# Patient Record
Sex: FEMALE | Race: WHITE | NOT HISPANIC OR LATINO | URBAN - METROPOLITAN AREA
[De-identification: names, ages, dates, MRNs, and addresses within clinical notes are randomized per-mention and may not be internally consistent; named-entity substitution may affect disease eponyms.]

---

## 2017-11-12 ENCOUNTER — INPATIENT (INPATIENT)
Facility: HOSPITAL | Age: 82
LOS: 9 days | Discharge: HOME | DRG: 280 | End: 2017-11-22
Attending: INTERNAL MEDICINE | Admitting: INTERNAL MEDICINE
Payer: MEDICARE

## 2017-11-12 VITALS
OXYGEN SATURATION: 91 % | DIASTOLIC BLOOD PRESSURE: 68 MMHG | HEART RATE: 74 BPM | SYSTOLIC BLOOD PRESSURE: 137 MMHG | WEIGHT: 154.98 LBS | RESPIRATION RATE: 28 BRPM

## 2017-11-12 DIAGNOSIS — I50.9 HEART FAILURE, UNSPECIFIED: ICD-10-CM

## 2017-11-12 DIAGNOSIS — I48.91 UNSPECIFIED ATRIAL FIBRILLATION: ICD-10-CM

## 2017-11-12 DIAGNOSIS — J96.01 ACUTE RESPIRATORY FAILURE WITH HYPOXIA: ICD-10-CM

## 2017-11-12 DIAGNOSIS — I21.4 NON-ST ELEVATION (NSTEMI) MYOCARDIAL INFARCTION: ICD-10-CM

## 2017-11-12 DIAGNOSIS — Z29.9 ENCOUNTER FOR PROPHYLACTIC MEASURES, UNSPECIFIED: ICD-10-CM

## 2017-11-12 LAB
ALBUMIN SERPL ELPH-MCNC: 2.9 G/DL — LOW (ref 3.5–5)
ALP SERPL-CCNC: 137 U/L — HIGH (ref 40–120)
ALT FLD-CCNC: 28 U/L DA — SIGNIFICANT CHANGE UP (ref 10–60)
ANION GAP SERPL CALC-SCNC: 6 MMOL/L — SIGNIFICANT CHANGE UP (ref 5–17)
APPEARANCE UR: CLEAR — SIGNIFICANT CHANGE UP
APTT BLD: 30.7 SEC — SIGNIFICANT CHANGE UP (ref 27.5–37.4)
AST SERPL-CCNC: 22 U/L — SIGNIFICANT CHANGE UP (ref 10–40)
BASE EXCESS BLDA CALC-SCNC: 0.5 MMOL/L — SIGNIFICANT CHANGE UP (ref -2–2)
BASE EXCESS BLDV CALC-SCNC: -1.2 MMOL/L — SIGNIFICANT CHANGE UP (ref -2–2)
BASOPHILS # BLD AUTO: 0.2 K/UL — SIGNIFICANT CHANGE UP (ref 0–0.2)
BASOPHILS NFR BLD AUTO: 1 % — SIGNIFICANT CHANGE UP (ref 0–2)
BILIRUB SERPL-MCNC: 0.4 MG/DL — SIGNIFICANT CHANGE UP (ref 0.2–1.2)
BILIRUB UR-MCNC: NEGATIVE — SIGNIFICANT CHANGE UP
BLOOD GAS COMMENTS ARTERIAL: SIGNIFICANT CHANGE UP
BLOOD GAS COMMENTS, VENOUS: SIGNIFICANT CHANGE UP
BUN SERPL-MCNC: 27 MG/DL — HIGH (ref 7–18)
CALCIUM SERPL-MCNC: 9.2 MG/DL — SIGNIFICANT CHANGE UP (ref 8.4–10.5)
CHLORIDE SERPL-SCNC: 105 MMOL/L — SIGNIFICANT CHANGE UP (ref 96–108)
CK MB BLD-MCNC: 6.9 % — HIGH (ref 0–3.5)
CK MB CFR SERPL CALC: 4.5 NG/ML — HIGH (ref 0–3.6)
CK SERPL-CCNC: 65 U/L — SIGNIFICANT CHANGE UP (ref 21–215)
CO2 SERPL-SCNC: 31 MMOL/L — SIGNIFICANT CHANGE UP (ref 22–31)
COLOR SPEC: YELLOW — SIGNIFICANT CHANGE UP
CREAT SERPL-MCNC: 1.23 MG/DL — SIGNIFICANT CHANGE UP (ref 0.5–1.3)
DIFF PNL FLD: ABNORMAL
EOSINOPHIL # BLD AUTO: 0.3 K/UL — SIGNIFICANT CHANGE UP (ref 0–0.5)
EOSINOPHIL NFR BLD AUTO: 1.6 % — SIGNIFICANT CHANGE UP (ref 0–6)
GLUCOSE SERPL-MCNC: 220 MG/DL — HIGH (ref 70–99)
GLUCOSE UR QL: NEGATIVE — SIGNIFICANT CHANGE UP
HCO3 BLDA-SCNC: 26 MMOL/L — SIGNIFICANT CHANGE UP (ref 23–27)
HCO3 BLDV-SCNC: 29 MMOL/L — SIGNIFICANT CHANGE UP (ref 21–29)
HCT VFR BLD CALC: 46.6 % — HIGH (ref 34.5–45)
HGB BLD-MCNC: 14.4 G/DL — SIGNIFICANT CHANGE UP (ref 11.5–15.5)
HOROWITZ INDEX BLDA+IHG-RTO: 40 — SIGNIFICANT CHANGE UP
HOROWITZ INDEX BLDV+IHG-RTO: 21 — SIGNIFICANT CHANGE UP
INR BLD: 1.34 RATIO — HIGH (ref 0.88–1.16)
KETONES UR-MCNC: NEGATIVE — SIGNIFICANT CHANGE UP
LACTATE SERPL-SCNC: 2.3 MMOL/L — HIGH (ref 0.7–2)
LEUKOCYTE ESTERASE UR-ACNC: NEGATIVE — SIGNIFICANT CHANGE UP
LYMPHOCYTES # BLD AUTO: 1.1 K/UL — SIGNIFICANT CHANGE UP (ref 1–3.3)
LYMPHOCYTES # BLD AUTO: 6.9 % — LOW (ref 13–44)
MCHC RBC-ENTMCNC: 30.5 PG — SIGNIFICANT CHANGE UP (ref 27–34)
MCHC RBC-ENTMCNC: 30.8 GM/DL — LOW (ref 32–36)
MCV RBC AUTO: 98.9 FL — SIGNIFICANT CHANGE UP (ref 80–100)
MONOCYTES # BLD AUTO: 0.8 K/UL — SIGNIFICANT CHANGE UP (ref 0–0.9)
MONOCYTES NFR BLD AUTO: 4.9 % — SIGNIFICANT CHANGE UP (ref 2–14)
NEUTROPHILS # BLD AUTO: 13.5 K/UL — HIGH (ref 1.8–7.4)
NEUTROPHILS NFR BLD AUTO: 85.7 % — HIGH (ref 43–77)
NITRITE UR-MCNC: NEGATIVE — SIGNIFICANT CHANGE UP
NT-PROBNP SERPL-SCNC: HIGH PG/ML (ref 0–450)
PCO2 BLDA: 48 MMHG — HIGH (ref 32–46)
PCO2 BLDV: 79 MMHG — HIGH (ref 35–50)
PH BLDA: 7.35 — SIGNIFICANT CHANGE UP (ref 7.35–7.45)
PH BLDV: 7.19 — CRITICAL LOW (ref 7.35–7.45)
PH UR: 5 — SIGNIFICANT CHANGE UP (ref 5–8)
PLATELET # BLD AUTO: 292 K/UL — SIGNIFICANT CHANGE UP (ref 150–400)
PO2 BLDA: 132 MMHG — HIGH (ref 74–108)
PO2 BLDV: SIGNIFICANT CHANGE UP MMHG (ref 25–45)
POTASSIUM SERPL-MCNC: 4.3 MMOL/L — SIGNIFICANT CHANGE UP (ref 3.5–5.3)
POTASSIUM SERPL-SCNC: 4.3 MMOL/L — SIGNIFICANT CHANGE UP (ref 3.5–5.3)
PROT SERPL-MCNC: 7.4 G/DL — SIGNIFICANT CHANGE UP (ref 6–8.3)
PROT UR-MCNC: NEGATIVE — SIGNIFICANT CHANGE UP
PROTHROM AB SERPL-ACNC: 14.7 SEC — HIGH (ref 9.8–12.7)
RBC # BLD: 4.71 M/UL — SIGNIFICANT CHANGE UP (ref 3.8–5.2)
RBC # FLD: 12.3 % — SIGNIFICANT CHANGE UP (ref 10.3–14.5)
SAO2 % BLDA: 98 % — HIGH (ref 92–96)
SAO2 % BLDV: 26 % — LOW (ref 67–88)
SODIUM SERPL-SCNC: 142 MMOL/L — SIGNIFICANT CHANGE UP (ref 135–145)
SP GR SPEC: 1.01 — SIGNIFICANT CHANGE UP (ref 1.01–1.02)
TROPONIN I SERPL-MCNC: 0.17 NG/ML — HIGH (ref 0–0.04)
TROPONIN I SERPL-MCNC: 0.2 NG/ML — HIGH (ref 0–0.04)
UROBILINOGEN FLD QL: NEGATIVE — SIGNIFICANT CHANGE UP
WBC # BLD: 15.8 K/UL — HIGH (ref 3.8–10.5)
WBC # FLD AUTO: 15.8 K/UL — HIGH (ref 3.8–10.5)

## 2017-11-12 PROCEDURE — 71010: CPT | Mod: 26

## 2017-11-12 PROCEDURE — 99053 MED SERV 10PM-8AM 24 HR FAC: CPT

## 2017-11-12 PROCEDURE — 99291 CRITICAL CARE FIRST HOUR: CPT

## 2017-11-12 RX ORDER — AZTREONAM 2 G
1000 VIAL (EA) INJECTION EVERY 8 HOURS
Qty: 0 | Refills: 0 | Status: DISCONTINUED | OUTPATIENT
Start: 2017-11-12 | End: 2017-11-14

## 2017-11-12 RX ORDER — AMIODARONE HYDROCHLORIDE 400 MG/1
200 TABLET ORAL
Qty: 0 | Refills: 0 | Status: DISCONTINUED | OUTPATIENT
Start: 2017-11-12 | End: 2017-11-13

## 2017-11-12 RX ORDER — IPRATROPIUM BROMIDE 0.2 MG/ML
500 SOLUTION, NON-ORAL INHALATION
Qty: 0 | Refills: 0 | Status: COMPLETED | OUTPATIENT
Start: 2017-11-12 | End: 2017-11-12

## 2017-11-12 RX ORDER — METOPROLOL TARTRATE 50 MG
12.5 TABLET ORAL
Qty: 0 | Refills: 0 | Status: DISCONTINUED | OUTPATIENT
Start: 2017-11-12 | End: 2017-11-13

## 2017-11-12 RX ORDER — ASPIRIN/CALCIUM CARB/MAGNESIUM 324 MG
300 TABLET ORAL ONCE
Qty: 0 | Refills: 0 | Status: COMPLETED | OUTPATIENT
Start: 2017-11-12 | End: 2017-11-12

## 2017-11-12 RX ORDER — ASPIRIN/CALCIUM CARB/MAGNESIUM 324 MG
81 TABLET ORAL DAILY
Qty: 0 | Refills: 0 | Status: DISCONTINUED | OUTPATIENT
Start: 2017-11-13 | End: 2017-11-22

## 2017-11-12 RX ORDER — AZTREONAM 2 G
1000 VIAL (EA) INJECTION ONCE
Qty: 0 | Refills: 0 | Status: COMPLETED | OUTPATIENT
Start: 2017-11-12 | End: 2017-11-12

## 2017-11-12 RX ORDER — HEPARIN SODIUM 5000 [USP'U]/ML
4000 INJECTION INTRAVENOUS; SUBCUTANEOUS EVERY 6 HOURS
Qty: 0 | Refills: 0 | Status: DISCONTINUED | OUTPATIENT
Start: 2017-11-12 | End: 2017-11-13

## 2017-11-12 RX ORDER — ALBUTEROL 90 UG/1
1 AEROSOL, METERED ORAL EVERY 6 HOURS
Qty: 0 | Refills: 0 | Status: DISCONTINUED | OUTPATIENT
Start: 2017-11-12 | End: 2017-11-12

## 2017-11-12 RX ORDER — TICAGRELOR 90 MG/1
180 TABLET ORAL ONCE
Qty: 0 | Refills: 0 | Status: DISCONTINUED | OUTPATIENT
Start: 2017-11-12 | End: 2017-11-12

## 2017-11-12 RX ORDER — ATORVASTATIN CALCIUM 80 MG/1
80 TABLET, FILM COATED ORAL AT BEDTIME
Qty: 0 | Refills: 0 | Status: DISCONTINUED | OUTPATIENT
Start: 2017-11-12 | End: 2017-11-22

## 2017-11-12 RX ORDER — FUROSEMIDE 40 MG
40 TABLET ORAL
Qty: 0 | Refills: 0 | Status: DISCONTINUED | OUTPATIENT
Start: 2017-11-12 | End: 2017-11-14

## 2017-11-12 RX ORDER — LEVOTHYROXINE SODIUM 125 MCG
50 TABLET ORAL DAILY
Qty: 0 | Refills: 0 | Status: DISCONTINUED | OUTPATIENT
Start: 2017-11-12 | End: 2017-11-22

## 2017-11-12 RX ORDER — ALBUTEROL 90 UG/1
2.5 AEROSOL, METERED ORAL
Qty: 0 | Refills: 0 | Status: COMPLETED | OUTPATIENT
Start: 2017-11-12 | End: 2017-11-12

## 2017-11-12 RX ORDER — LOSARTAN POTASSIUM 100 MG/1
25 TABLET, FILM COATED ORAL DAILY
Qty: 0 | Refills: 0 | Status: DISCONTINUED | OUTPATIENT
Start: 2017-11-12 | End: 2017-11-14

## 2017-11-12 RX ORDER — IPRATROPIUM/ALBUTEROL SULFATE 18-103MCG
3 AEROSOL WITH ADAPTER (GRAM) INHALATION EVERY 6 HOURS
Qty: 0 | Refills: 0 | Status: DISCONTINUED | OUTPATIENT
Start: 2017-11-12 | End: 2017-11-12

## 2017-11-12 RX ORDER — FUROSEMIDE 40 MG
40 TABLET ORAL ONCE
Qty: 0 | Refills: 0 | Status: COMPLETED | OUTPATIENT
Start: 2017-11-12 | End: 2017-11-12

## 2017-11-12 RX ORDER — ASPIRIN/CALCIUM CARB/MAGNESIUM 324 MG
325 TABLET ORAL ONCE
Qty: 0 | Refills: 0 | Status: COMPLETED | OUTPATIENT
Start: 2017-11-12 | End: 2017-11-12

## 2017-11-12 RX ORDER — FUROSEMIDE 40 MG
40 TABLET ORAL DAILY
Qty: 0 | Refills: 0 | Status: DISCONTINUED | OUTPATIENT
Start: 2017-11-12 | End: 2017-11-12

## 2017-11-12 RX ORDER — TIOTROPIUM BROMIDE 18 UG/1
1 CAPSULE ORAL; RESPIRATORY (INHALATION) DAILY
Qty: 0 | Refills: 0 | Status: DISCONTINUED | OUTPATIENT
Start: 2017-11-12 | End: 2017-11-12

## 2017-11-12 RX ORDER — IPRATROPIUM/ALBUTEROL SULFATE 18-103MCG
3 AEROSOL WITH ADAPTER (GRAM) INHALATION EVERY 6 HOURS
Qty: 0 | Refills: 0 | Status: DISCONTINUED | OUTPATIENT
Start: 2017-11-12 | End: 2017-11-13

## 2017-11-12 RX ORDER — FUROSEMIDE 40 MG
40 TABLET ORAL DAILY
Qty: 0 | Refills: 0 | Status: COMPLETED | OUTPATIENT
Start: 2017-11-12 | End: 2017-11-12

## 2017-11-12 RX ORDER — VANCOMYCIN HCL 1 G
750 VIAL (EA) INTRAVENOUS EVERY 12 HOURS
Qty: 0 | Refills: 0 | Status: DISCONTINUED | OUTPATIENT
Start: 2017-11-12 | End: 2017-11-14

## 2017-11-12 RX ORDER — VANCOMYCIN HCL 1 G
1000 VIAL (EA) INTRAVENOUS ONCE
Qty: 0 | Refills: 0 | Status: COMPLETED | OUTPATIENT
Start: 2017-11-12 | End: 2017-11-12

## 2017-11-12 RX ORDER — VANCOMYCIN HCL 1 G
1000 VIAL (EA) INTRAVENOUS EVERY 12 HOURS
Qty: 0 | Refills: 0 | Status: DISCONTINUED | OUTPATIENT
Start: 2017-11-12 | End: 2017-11-12

## 2017-11-12 RX ORDER — IPRATROPIUM BROMIDE 0.2 MG/ML
1 SOLUTION, NON-ORAL INHALATION EVERY 6 HOURS
Qty: 0 | Refills: 0 | Status: DISCONTINUED | OUTPATIENT
Start: 2017-11-12 | End: 2017-11-12

## 2017-11-12 RX ORDER — HEPARIN SODIUM 5000 [USP'U]/ML
600 INJECTION INTRAVENOUS; SUBCUTANEOUS
Qty: 25000 | Refills: 0 | Status: DISCONTINUED | OUTPATIENT
Start: 2017-11-13 | End: 2017-11-13

## 2017-11-12 RX ORDER — HEPARIN SODIUM 5000 [USP'U]/ML
4000 INJECTION INTRAVENOUS; SUBCUTANEOUS ONCE
Qty: 0 | Refills: 0 | Status: COMPLETED | OUTPATIENT
Start: 2017-11-12 | End: 2017-11-12

## 2017-11-12 RX ORDER — HEPARIN SODIUM 5000 [USP'U]/ML
INJECTION INTRAVENOUS; SUBCUTANEOUS
Qty: 25000 | Refills: 0 | Status: DISCONTINUED | OUTPATIENT
Start: 2017-11-12 | End: 2017-11-12

## 2017-11-12 RX ADMIN — HEPARIN SODIUM 800 UNIT(S)/HR: 5000 INJECTION INTRAVENOUS; SUBCUTANEOUS at 06:33

## 2017-11-12 RX ADMIN — Medication 40 MILLIGRAM(S): at 14:35

## 2017-11-12 RX ADMIN — Medication 500 MICROGRAM(S): at 04:42

## 2017-11-12 RX ADMIN — Medication 300 MILLIGRAM(S): at 07:47

## 2017-11-12 RX ADMIN — Medication 40 MILLIGRAM(S): at 21:09

## 2017-11-12 RX ADMIN — HEPARIN SODIUM 4000 UNIT(S): 5000 INJECTION INTRAVENOUS; SUBCUTANEOUS at 06:31

## 2017-11-12 RX ADMIN — Medication 50 MICROGRAM(S): at 12:43

## 2017-11-12 RX ADMIN — AMIODARONE HYDROCHLORIDE 200 MILLIGRAM(S): 400 TABLET ORAL at 17:12

## 2017-11-12 RX ADMIN — Medication 150 MILLIGRAM(S): at 18:35

## 2017-11-12 RX ADMIN — Medication 125 MILLIGRAM(S): at 04:34

## 2017-11-12 RX ADMIN — ALBUTEROL 2.5 MILLIGRAM(S): 90 AEROSOL, METERED ORAL at 04:25

## 2017-11-12 RX ADMIN — Medication 40 MILLIGRAM(S): at 04:34

## 2017-11-12 RX ADMIN — Medication 250 MILLIGRAM(S): at 04:38

## 2017-11-12 RX ADMIN — Medication 12.5 MILLIGRAM(S): at 17:12

## 2017-11-12 RX ADMIN — Medication 50 MILLIGRAM(S): at 16:57

## 2017-11-12 RX ADMIN — Medication 50 MILLIGRAM(S): at 04:41

## 2017-11-12 RX ADMIN — LOSARTAN POTASSIUM 25 MILLIGRAM(S): 100 TABLET, FILM COATED ORAL at 17:20

## 2017-11-12 RX ADMIN — ALBUTEROL 1 PUFF(S): 90 AEROSOL, METERED ORAL at 20:37

## 2017-11-12 RX ADMIN — Medication 40 MILLIGRAM(S): at 17:20

## 2017-11-12 RX ADMIN — ALBUTEROL 2.5 MILLIGRAM(S): 90 AEROSOL, METERED ORAL at 04:30

## 2017-11-12 RX ADMIN — Medication 3 MILLILITER(S): at 21:02

## 2017-11-12 RX ADMIN — Medication 50 MILLIGRAM(S): at 21:11

## 2017-11-12 RX ADMIN — Medication 40 MILLIGRAM(S): at 12:43

## 2017-11-12 RX ADMIN — Medication 500 MICROGRAM(S): at 04:00

## 2017-11-12 RX ADMIN — ALBUTEROL 2.5 MILLIGRAM(S): 90 AEROSOL, METERED ORAL at 04:42

## 2017-11-12 RX ADMIN — ATORVASTATIN CALCIUM 80 MILLIGRAM(S): 80 TABLET, FILM COATED ORAL at 21:10

## 2017-11-12 RX ADMIN — Medication 500 MICROGRAM(S): at 04:44

## 2017-11-12 NOTE — PROGRESS NOTE ADULT - ASSESSMENT
89 yr old male, from NH, H/O A FIB/CHF/COPD/HLD/CAD/dementia, admit hospital for acute respirotary distress/resp failure/shock, admit ICU, on BIPAP, IV ABS, elevated BNP/troponin, cardiology consult, F/U ECHO, ICU care, DVT prophylaxis.

## 2017-11-12 NOTE — H&P ADULT - ATTENDING COMMENTS
MICU ATTENDING.  I have personally seen and examined the patient. I agree with history, physical and plan which I have reviewed and edited as appropriate. I was physically present for the key points of the service provided. I total spent 35 min.    89 y/o with multiple med problem presented with respiratory distress and hypoxemia. Started on BiPAP in ER, given, Albuterol/Atrovent, Solumedrol and lasix with symptomatic improvement.   A/P Hypoxic/hyper capneic failure   CHF/COPD exacerbation   ? pneumonia   A/FIB   NSTEMI     MICU  NPO   Cont with BIPAP, serial ABG, taper FIo2 as tolerated,   Serial CXR   Keep negative  IV Lasix  Solumedrol IV, Proventil/Atrovent   Panculture, Empiric antibiotics   Cardiology consult   Serial Ce   IV heparin, ASA, lipitor

## 2017-11-12 NOTE — H&P ADULT - NEGATIVE NEUROLOGICAL SYMPTOMS
no syncope/no headache/no generalized seizures/no focal seizures/no loss of consciousness/no transient paralysis/no confusion/no tremors/no vertigo/no loss of sensation

## 2017-11-12 NOTE — PROGRESS NOTE ADULT - ASSESSMENT
89 y/o F pt from [facility] with PMHx of AFib, Dementia, Heart Failure, HLD, HTN, Hypothyroidism, NSTEMI, Osteoporosis, and COPD and no significant PSHx BIB EMS from nursing home to ED with difficulty breathing noted today. Per EMS, pt with O2Sat of 52% on room air; pt was placed on a CPAP en route to ED. EMS reports no medications were given to pt during the transport to ED. Pt is DNR/DNI on MOLST, per [facility] paperwork and confirmed with pt and his son. Pt unable to provide detailed Hx and HPI due to condition (respiratory distress). PMD: Dr. Topete.  Pt denies fever, chest pain, Abd pain, nausea, vomiting, or any other complaints. Pt is allergic to Penicillin.  At NH RR 30, SpO2 55-85% on RA, T 97 F, HR 85, /90.  In ED pt RR 28, SpO2 91%, saturating 98% on 40% b 98%. Pt is Alert and orientedx2.  Pt is being admitted to ICU for Acute hypoxic Hypercapneic resp failure sec to Acute on Chronic CHF exacerbation and COPD exacerbation likely sec to HCAP/ Acute Bronchitis given leukocytosis requiring new Bipap.

## 2017-11-12 NOTE — ED PROVIDER NOTE - MEDICAL DECISION MAKING DETAILS
89yoF with h/o COPD and CHF p/w hypoxia. Diff dx includes CHF, COPD, PE, ACS, PNA. Noted rales and h/o CHF c/w CHF exacerbation. Possible component of COPD with wheezes and history. Also noted Wellen's morphology on ECG, no prior ECG's with which to compare, elevated troponin, concerning for proximal LAD lesion, but at the time of the ECG her SOB and CP had resolved, confirmed by multiple questioning, and patient alert and oriented. No u/l leg swelling and symptoms resolved with BiPAP, making PE less likely. No evidence of PNA on CXR. Under our care noted hypercapnia/hypoxia, transitioned to BiPAP with stabilization. Also 40mg Lasix for CHF, 3 Duonebs/solumedrol for COPD, and vanc/aztreonam for possible pneumonia coverage in this critically ill patient. No fluids given at this time in light of stable BP and her presenting with HF. Discussed with family at bedside, who agreed with plan and confirmed that patient adamant about being DNR and DNI. Admitted to ICU for further monitoring, w/u, and care.    Other diagnoses: acute COPD exacerbation, severe sepsis, hyperglycemia, DNR, DNI

## 2017-11-12 NOTE — ED PROVIDER NOTE - SECONDARY DIAGNOSIS.
NSTEMI (non-ST elevated myocardial infarction) COPD exacerbation Acute respiratory failure with hypoxia and hypercapnia

## 2017-11-12 NOTE — ED PROVIDER NOTE - DATA REVIEWED, MDM
nurses notes/nurse home records/vital signs/EMS record/old records vital signs/nurse home records/EMS record/nurses notes

## 2017-11-12 NOTE — ED PROVIDER NOTE - PHYSICAL EXAMINATION
Afebrile, hemodynamically stable  NAD, mild increased WOB, on CPAP  Head NCAT  EOMI grossly  MMM  No JVD  RRR, nml S1/S2, no m/r/g  Diffuse rales. Scattered wheezes  Abd soft, NT, ND, nml BS, no rebound or guarding  AAO, CN's 3-12 grossly intact. Opens eyes to voice, moves spontaneously  HARRIS spontaneously, no leg cyanosis or edema  Skin cool, no rashes or hives

## 2017-11-12 NOTE — H&P ADULT - HISTORY OF PRESENT ILLNESS
89 y/o F pt from [facility] with PMHx of AFib, Dementia, Heart Failure, HLD, HTN, Hypothyroidism, NSTEMI, Osteoporosis, and COPD and no significant PSHx BIB EMS from nursing home to ED with difficulty breathing noted today. Per EMS, pt with O2Sat of 52% on room air; pt was placed on a CPAP en route to ED. EMS reports no medications were given to pt during the transport to ED. Pt is DNR/DNI on MOLST, per [facility] paperwork and confirmed with pt and his son. Pt unable to provide detailed Hx and HPI due to condition (respiratory distress). PMD: Dr. Topete.  Pt denies fever, chest pain, Abd pain, nausea, vomiting, or any other complaints. Pt is allergic to Penicillin.  At NH RR 30, SpO2 55-85% on RA, T 97 F, HR 85, /90.  In ED pt RR 28, SpO2 91%, saturating 98% on 40% b 98%. Pt is Alert and orientedx2.

## 2017-11-12 NOTE — ED PROVIDER NOTE - CARE PLAN
Principal Discharge DX:	CHF exacerbation  Secondary Diagnosis:	NSTEMI (non-ST elevated myocardial infarction)  Secondary Diagnosis:	COPD exacerbation Principal Discharge DX:	CHF exacerbation  Secondary Diagnosis:	NSTEMI (non-ST elevated myocardial infarction)  Secondary Diagnosis:	Acute respiratory failure with hypoxia and hypercapnia

## 2017-11-12 NOTE — ED ADULT NURSE NOTE - OBJECTIVE STATEMENT
brought in by ambulance from nursing home due to respiratory distress, pt lethargic, arousable by pain, seen and examined by Dr Mcginnis, BIPAP started, noted with deep tissue injury both buttocks.

## 2017-11-12 NOTE — PROGRESS NOTE ADULT - ATTENDING COMMENTS
89 female with hx of CHF, COPD, Afib, presents with acute resp failure requiring bipap, likely due to CHF exacerbation/pulmonary edema, also with NSTEMI.  Currently on abx but pneumonia less likely.  Total CC time 35 min.

## 2017-11-12 NOTE — H&P ADULT - ASSESSMENT
89 y/o F pt from [facility] with PMHx of AFib, Dementia, Heart Failure, HLD, HTN, Hypothyroidism, NSTEMI, Osteoporosis, and COPD and no significant PSHx BIB EMS from nursing home to ED with difficulty breathing noted today. Per EMS, pt with O2Sat of 52% on room air; pt was placed on a CPAP en route to ED. EMS reports no medications were given to pt during the transport to ED. Pt is DNR/DNI on MOLST, per [facility] paperwork and confirmed with pt and his son. Pt unable to provide detailed Hx and HPI due to condition (respiratory distress). PMD: Dr. Topete.  Pt denies fever, chest pain, Abd pain, nausea, vomiting, or any other complaints. Pt is allergic to Penicillin.  At NH RR 30, SpO2 55-85% on RA, T 97 F, HR 85, /90.  In ED pt RR 28, SpO2 91%, saturating 98% on 40% b 98%. Pt is Alert and orientedx2.  Pt is being admitted to ICU for Acute hypoxic Hypercapneic resp failure sec to Acute on Chronic CHF exacerbation and COPD exacerbation likely sec to HCAP/ Acute Bronchitis given leukocytosis requiring new Bipap.

## 2017-11-12 NOTE — H&P ADULT - PMH
Atrial fibrillation    COPD (chronic obstructive pulmonary disease)    Dementia    Heart failure    HLD (hyperlipidemia)    HTN (hypertension)    Hypothyroidism    NSTEMI (non-ST elevated myocardial infarction)    Osteoporosis

## 2017-11-12 NOTE — ED PROVIDER NOTE - CONDUCTED A DETAILED DISCUSSION WITH PATIENT AND/OR GUARDIAN REGARDING, MDM
need for outpatient follow-up/radiology results/lab results need to admit/radiology results/lab results

## 2017-11-12 NOTE — H&P ADULT - PROBLEM SELECTOR PLAN 2
Pt has elevated troponin 0.17 and SOB with elevated pro BNP with deep T waves in vel lateral leads consistent with NSTEMI and CHF exacerbtaion  Started on heparin drip  s/p  MG  C/W asa, Lipiotr, lopressor  f/u ECHO  Trend CE X3  Cardio Dr. Dailey

## 2017-11-12 NOTE — PROGRESS NOTE ADULT - SUBJECTIVE AND OBJECTIVE BOX
Patient is a 89y old  Female who presents with a chief complaint of SOB/respirotary failure, admit to ICU care, on BIPAP/IV ABS, elevated BNP/troponin, cardiology consult, F/U ECHO      INTERVAL HPI/OVERNIGHT EVENTS:  T(C): 36.3 (17 @ 09:00), Max: 37.4 (17 @ 07:38)  HR: 68 (17 @ 09:00) (67 - 81)  BP: 111/56 (17 @ 09:00) (111/56 - 137/68)  RR: 23 (17 @ 09:00) (18 - 28)  SpO2: 95% (17 @ 09:00) (91% - 99%)  Wt(kg): --    LABS:                        14.4   15.8  )-----------( 292      ( 2017 05:00 )             46.6         142  |  105  |  27<H>  ----------------------------<  220<H>  4.3   |  31  |  1.23    Ca    9.2      2017 05:00    TPro  7.4  /  Alb  2.9<L>  /  TBili  0.4  /  DBili  x   /  AST  22  /  ALT  28  /  AlkPhos  137<H>  11    PT/INR - ( 2017 05:00 )   PT: 14.7 sec;   INR: 1.34 ratio         Serum Pro-Brain Natriuretic Peptide (17 @ 05:00)    Serum Pro-Brain Natriuretic Peptide: 91537: Interpretive guide for NT PRO-BNP    PTT - ( 2017 05:00 )  PTT:30.7 sec  Urinalysis Basic - ( 2017 08:14 )    Color: Yellow / Appearance: Clear / S.010 / pH: x  Gluc: x / Ketone: Negative  / Bili: Negative / Urobili: Negative   Blood: x / Protein: Negative / Nitrite: Negative   Leuk Esterase: Negative / RBC: x / WBC x   Sq Epi: x / Non Sq Epi: x / Bacteria: x      CAPILLARY BLOOD GLUCOSE        ABG - ( 2017 05:15 )  pH: 7.35  /  pCO2: 48    /  pO2: 132   / HCO3: 26    / Base Excess: 0.5   /  SaO2: 98                  RADIOLOGY & ADDITIONAL TESTS:  < from: Xray Chest 1 View AP-PORTABLE IMMEDIATE (17 @ 07:21) >  INTERPRETATION:  CLINICAL INFORMATION: Shortness of breath.    A single portable view of the chest was obtained.     Comparison: None.     The mediastinal cardiac silhouette is unremarkable. Aortic calcifications.    Mild diffuse reticular pattern with a perihilar distribution likely   vascular. Small left effusion. Question pulmonary edema.    No acute osseous finding.     IMPRESSION:    Findings as above.    < end of copied text >    Consultant(s) Notes Reviewed:  [x ] YES  [ ] NO    PHYSICAL EXAM:  GENERAL: well built, well nourished  HEAD:  Atraumatic, Normocephalic  EYES: EOMI, PERRLA, conjunctiva and sclera clear  ENT: No tonsillar erythema, exudates, or enlargement; Moist mucous membranes, Good dentition, No lesions, on BIPAP mask  NECK: Supple, No JVD, Normal thyroid, no enlarged nodes  NERVOUS SYSTEM:  Alert & Oriented X3, Good concentration; Motor Strength 5/5 B/L upper and lower extremities; DTRs 2+ intact and symmetric, sensory intact  CHEST/LUNG: B/L good air entry; No rales, rhonchi, or wheezing  HEART: S1S2 normal, no S3, Regular rate and rhythm; No murmurs, rubs, or gallops  ABDOMEN: Soft, Nontender, Nondistended; Bowel sounds present  EXTREMITIES:  2+ Peripheral Pulses, No clubbing, cyanosis, mild edema, mild cold on touch B/L legs  LYMPH: No lymphadenopathy noted  SKIN: No rashes or lesions    Care Discussed with Consultants/Other Providers [ x] YES  [ ] NO

## 2017-11-12 NOTE — ED PROVIDER NOTE - OBJECTIVE STATEMENT
89 y/o F pt with PMHx of AFib, Dementia, Heart Failure, HLD, HTN, NSTEMI, Osteoporosis, and COPD and no significant PSHx BIB EMS from nursing home to ED with difficulty breathing noted today. Per EMS, pt with O2Sat of 52% on room air; pt was placed on a CPAP en route to ED. Pt is currently DNR/DNI, per nursing home paperwork. Pt unable to provide detailed Hx and HPI due to condition (respiratory distress). 89 y/o F pt with PMHx of AFib, Dementia, Heart Failure, HLD, HTN, NSTEMI, Osteoporosis, and COPD and no significant PSHx BIB EMS from nursing home to ED with difficulty breathing noted today. Per EMS, pt with O2Sat of 52% on room air; pt was placed on a CPAP en route to ED. Pt is DNR, per nursing home paperwork. Pt unable to provide detailed Hx and HPI due to condition (respiratory distress). 89 y/o F pt with PMHx of AFib, Dementia, Heart Failure, HLD, HTN, Hypothyroidism, NSTEMI, Osteoporosis, and COPD and no significant PSHx BIB EMS from nursing home to ED with difficulty breathing noted today. Per EMS, pt with O2Sat of 52% on room air; pt was placed on a CPAP en route to ED. EMS reports no medications were given to pt during the transport to ED. Pt is DNR, per nursing home paperwork. Pt unable to provide detailed Hx and HPI due to condition (respiratory distress). 89 y/o F pt with PMHx of AFib, Dementia, Heart Failure, HLD, HTN, Hypothyroidism, NSTEMI, Osteoporosis, and COPD and no significant PSHx BIB EMS from nursing home to ED with difficulty breathing noted today. Per EMS, pt with O2Sat of 52% on room air; pt was placed on a CPAP en route to ED. EMS reports no medications were given to pt during the transport to ED. Pt is DNR, per nursing home paperwork. Pt unable to provide detailed Hx and HPI due to condition (respiratory distress). PMD: Dr. Topete. 89 y/o F pt with PMHx of AFib, Dementia, Heart Failure, HLD, HTN, Hypothyroidism, NSTEMI, Osteoporosis, and COPD and no significant PSHx BIB EMS from nursing home to ED with difficulty breathing noted today. Per EMS, pt with O2Sat of 52% on room air; pt was placed on a CPAP en route to ED. EMS reports no medications were given to pt during the transport to ED. Pt is DNR, per nursing home paperwork. Following being placed on BiPAP, patient interviewed and states SOB but largely resolved, and denies CP. PMD: Dr. Topete.

## 2017-11-12 NOTE — H&P ADULT - NEGATIVE MUSCULOSKELETAL SYMPTOMS
no joint swelling/no arm pain L/no arm pain R/no back pain/no leg pain L/no leg pain R/no muscle weakness

## 2017-11-12 NOTE — PROGRESS NOTE ADULT - SUBJECTIVE AND OBJECTIVE BOX
INTERVAL HPI/OVERNIGHT EVENTS: For respiratory distress in nursing home pt was placed on bipap in ambulance.     PRESSORS: [ ] YES [x] NO  WHICH:    ANTIBIOTICS: Azactam                 DATE STARTED: 11/12  ANTIBIOTICS: Vanco                 DATE STARTED: 11/12    Antimicrobial:  aztreonam  IVPB 1000 milliGRAM(s) IV Intermittent every 8 hours  vancomycin  IVPB 1000 milliGRAM(s) IV Intermittent every 12 hours    Cardiovascular:  amiodarone    Tablet 200 milliGRAM(s) Oral two times a day  furosemide   Injectable 40 milliGRAM(s) IV Push daily  losartan 25 milliGRAM(s) Oral daily  metoprolol     tartrate 12.5 milliGRAM(s) Oral two times a day    Pulmonary:  ALBUTerol    90 MICROgram(s) HFA Inhaler 1 Puff(s) Inhalation every 6 hours  tiotropium 18 MICROgram(s) Capsule 1 Capsule(s) Inhalation daily    Hematalogic:  heparin  Infusion.  Unit(s)/Hr IV Continuous <Continuous>  heparin  Injectable 4000 Unit(s) IV Push every 6 hours PRN    Other:  atorvastatin 80 milliGRAM(s) Oral at bedtime  levothyroxine 50 MICROGram(s) Oral daily  methylPREDNISolone sodium succinate Injectable 40 milliGRAM(s) IV Push every 8 hours    ALBUTerol    90 MICROgram(s) HFA Inhaler 1 Puff(s) Inhalation every 6 hours  amiodarone    Tablet 200 milliGRAM(s) Oral two times a day  atorvastatin 80 milliGRAM(s) Oral at bedtime  aztreonam  IVPB 1000 milliGRAM(s) IV Intermittent every 8 hours  furosemide   Injectable 40 milliGRAM(s) IV Push daily  heparin  Infusion.  Unit(s)/Hr IV Continuous <Continuous>  heparin  Injectable 4000 Unit(s) IV Push every 6 hours PRN  levothyroxine 50 MICROGram(s) Oral daily  losartan 25 milliGRAM(s) Oral daily  methylPREDNISolone sodium succinate Injectable 40 milliGRAM(s) IV Push every 8 hours  metoprolol     tartrate 12.5 milliGRAM(s) Oral two times a day  tiotropium 18 MICROgram(s) Capsule 1 Capsule(s) Inhalation daily  vancomycin  IVPB 1000 milliGRAM(s) IV Intermittent every 12 hours    Drug Dosing Weight    Weight (kg): 70.3 (12 Nov 2017 04:29)    CENTRAL LINE: [ ] YES [x] NO  LOCATION:   DATE INSERTED:    [place]: [x] YES [ ] NO    DATE INSERTED:    A-LINE:  [ ] YES [x] NO  LOCATION:   DATE INSERTED:    ICU Vital Signs Last 24 Hrs  T(C): 37.4 (12 Nov 2017 07:38), Max: 37.4 (12 Nov 2017 07:38)  T(F): 99.4 (12 Nov 2017 07:38), Max: 99.4 (12 Nov 2017 07:38)  HR: 69 (12 Nov 2017 07:38) (69 - 78)  BP: 112/49 (12 Nov 2017 07:38) (112/45 - 137/68)  BP(mean): --  ABP: --  ABP(mean): --  RR: 20 (12 Nov 2017 07:38) (20 - 28)  SpO2: 99% (12 Nov 2017 07:38) (91% - 99%)      ABG - ( 12 Nov 2017 05:15 )  pH: 7.35  /  pCO2: 48    /  pO2: 132   / HCO3: 26    / Base Excess: 0.5   /  SaO2: 98                          PHYSICAL EXAM:    GENERAL: Well developed male, NAD  HEENT:  Normocephalic/Atraumatic, reactive light reflex, moist mucous membranes  NECK: Supple, no JVD  RESP: Symmetric movement of the chest, clear to auscultation bilaterally, + wheeze  CVS: S1 and S2 audible, no murmur, rubs or gallops noted  GI: Normal active bowel sounds present, abdomen soft, non tender, non distended, without scar marks, no hepatosleenomegaly  EXTREMITIES:  no edema, no clubbing, cyanosis   MSK: Unable to asses due to lethargy and being on bipap   NEURO: alert and oriented x 0    LABS:  CBC Full  -  ( 12 Nov 2017 05:00 )  WBC Count : 15.8 K/uL  Hemoglobin : 14.4 g/dL  Hematocrit : 46.6 %  Platelet Count - Automated : 292 K/uL  Mean Cell Volume : 98.9 fl  Mean Cell Hemoglobin : 30.5 pg  Mean Cell Hemoglobin Concentration : 30.8 gm/dL  Auto Neutrophil # : 13.5 K/uL  Auto Lymphocyte # : 1.1 K/uL  Auto Monocyte # : 0.8 K/uL  Auto Eosinophil # : 0.3 K/uL  Auto Basophil # : 0.2 K/uL  Auto Neutrophil % : 85.7 %  Auto Lymphocyte % : 6.9 %  Auto Monocyte % : 4.9 %  Auto Eosinophil % : 1.6 %  Auto Basophil % : 1.0 %    11-12    142  |  105  |  27<H>  ----------------------------<  220<H>  4.3   |  31  |  1.23    Ca    9.2      12 Nov 2017 05:00    TPro  7.4  /  Alb  2.9<L>  /  TBili  0.4  /  DBili  x   /  AST  22  /  ALT  28  /  AlkPhos  137<H>  11-12    PT/INR - ( 12 Nov 2017 05:00 )   PT: 14.7 sec;   INR: 1.34 ratio         PTT - ( 12 Nov 2017 05:00 )  PTT:30.7 sec        RADIOLOGY & ADDITIONAL STUDIES REVIEWED:   CXR: Mild diffuse reticular pattern with a perihilar distribution likely   vascular. Small left effusion. Question pulmonary edema.    [ ]GOALS OF CARE DISCUSSION WITH PATIENT/FAMILY/PROXY:    CRITICAL CARE TIME SPENT: 35 minutes

## 2017-11-12 NOTE — CONSULT NOTE ADULT - SUBJECTIVE AND OBJECTIVE BOX
HPI:  89 y/o F pt from [facility] with PMHx of AFib, Dementia, Heart Failure, HLD, HTN, Hypothyroidism, NSTEMI, Osteoporosis, and COPD and no significant PSHx BIB EMS from nursing home to ED with difficulty breathing noted today. Per EMS, pt with O2Sat of 52% on room air; pt was placed on a CPAP en route to ED. EMS reports no medications were given to pt during the transport to ED. Pt is DNR/DNI on MOLST, per [facility] paperwork and confirmed with pt and his son. Pt unable to provide detailed Hx and HPI due to condition (respiratory distress). PMD: Dr. Topete.  Pt denies fever, chest pain, Abd pain, nausea, vomiting, or any other complaints. Pt is allergic to Penicillin.  At NH RR 30, SpO2 55-85% on RA, T 97 F, HR 85, /90.  In ED pt RR 28, SpO2 91%, saturating 98% on 40% b 98%. Pt is Alert and orientedx2. (2017 06:50)      PAST MEDICAL & SURGICAL HISTORY:  Hypothyroidism  HLD (hyperlipidemia)  Osteoporosis  Dementia  HTN (hypertension)  Heart failure  NSTEMI (non-ST elevated myocardial infarction)  Atrial fibrillation  COPD (chronic obstructive pulmonary disease)  No significant past surgical history      penicillin (Unknown)      Meds:  ALBUTerol    90 MICROgram(s) HFA Inhaler 1 Puff(s) Inhalation every 6 hours  amiodarone    Tablet 200 milliGRAM(s) Oral two times a day  atorvastatin 80 milliGRAM(s) Oral at bedtime  aztreonam  IVPB 1000 milliGRAM(s) IV Intermittent every 8 hours  furosemide   Injectable 40 milliGRAM(s) IV Push two times a day  heparin  Infusion.  Unit(s)/Hr IV Continuous <Continuous>  heparin  Injectable 4000 Unit(s) IV Push every 6 hours PRN  levothyroxine 50 MICROGram(s) Oral daily  losartan 25 milliGRAM(s) Oral daily  methylPREDNISolone sodium succinate Injectable 40 milliGRAM(s) IV Push every 8 hours  metoprolol     tartrate 12.5 milliGRAM(s) Oral two times a day  tiotropium 18 MICROgram(s) Capsule 1 Capsule(s) Inhalation daily  vancomycin  IVPB 1000 milliGRAM(s) IV Intermittent every 12 hours      SOCIAL HISTORY:  Smoker:  YES / NO        PACK YEARS:                         WHEN QUIT?  ETOH use:  YES / NO               FREQUENCY / QUANTITY:  Ilicit Drug use:  YES / NO  Occupation:  Assisted device use (Cane / Walker):  Live with:    FAMILY HISTORY:  No pertinent family history in first degree relatives      VITALS:  Vital Signs Last 24 Hrs  T(C): 36.6 (2017 14:00), Max: 37.4 (2017 07:38)  T(F): 97.8 (2017 14:00), Max: 99.4 (2017 07:38)  HR: 74 (2017 14:00) (67 - 81)  BP: 139/64 (2017 14:00) (111/56 - 139/64)  BP(mean): 79 (:00) (66 - 79)  RR: 26 (:00) (18 - 39)  SpO2: 95% (2017 14:00) (91% - 100%)    LABS/DIAGNOSTIC TESTS:                          14.4   15.8  )-----------( 292      ( 2017 05:00 )             46.6     WBC Count: 15.8 K/uL ( @ 05:00)          142  |  105  |  27<H>  ----------------------------<  220<H>  4.3   |  31  |  1.23    Ca    9.2      2017 05:00    TPro  7.4  /  Alb  2.9<L>  /  TBili  0.4  /  DBili  x   /  AST  22  /  ALT  28  /  AlkPhos  137<H>        Urinalysis Basic - ( 2017 08:14 )    Color: Yellow / Appearance: Clear / S.010 / pH: x  Gluc: x / Ketone: Negative  / Bili: Negative / Urobili: Negative   Blood: x / Protein: Negative / Nitrite: Negative   Leuk Esterase: Negative / RBC: 0-2 /HPF / WBC 0-2 /HPF   Sq Epi: x / Non Sq Epi: Occasional /HPF / Bacteria: Many /HPF        LIVER FUNCTIONS - ( 2017 05:00 )  Alb: 2.9 g/dL / Pro: 7.4 g/dL / ALK PHOS: 137 U/L / ALT: 28 U/L DA / AST: 22 U/L / GGT: x             PT/INR - ( 2017 05:00 )   PT: 14.7 sec;   INR: 1.34 ratio         PTT - ( 2017 05:00 )  PTT:30.7 sec    LACTATE:Lactate, Blood: 2.3 mmol/L ( @ 05:04)      ABG - ABG - ( 2017 05:15 )  pH: 7.35  /  pCO2: 48    /  pO2: 132   / HCO3: 26    / Base Excess: 0.5   /  SaO2: 98                  CULTURES:       RADIOLOGY:      ROS  [  ] UNABLE TO ELICIT HPI:  89 y/o F pt from [facility] with PMHx of AFib, Dementia, Heart Failure, HLD, HTN, Hypothyroidism, NSTEMI, Osteoporosis, and COPD and no significant PSHx BIB EMS from nursing home to ED with difficulty breathing noted today. Per EMS, pt with O2Sat of 52% on room air; pt was placed on a CPAP en route to ED. EMS reports no medications were given to pt during the transport to ED. Pt is DNR/DNI on MOLST, per [facility] paperwork and confirmed with pt and his son. Pt unable to provide detailed Hx and HPI due to condition (respiratory distress). PMD: Dr. Topete.  Pt denies fever, chest pain, Abd pain, nausea, vomiting, or any other complaints.         PAST MEDICAL & SURGICAL HISTORY:  Hypothyroidism  HLD (hyperlipidemia)  Osteoporosis  Dementia  HTN (hypertension)  Heart failure  NSTEMI (non-ST elevated myocardial infarction)  Atrial fibrillation  COPD (chronic obstructive pulmonary disease)  No significant past surgical history      penicillin (Unknown)      Meds:  ALBUTerol    90 MICROgram(s) HFA Inhaler 1 Puff(s) Inhalation every 6 hours  amiodarone    Tablet 200 milliGRAM(s) Oral two times a day  atorvastatin 80 milliGRAM(s) Oral at bedtime  aztreonam  IVPB 1000 milliGRAM(s) IV Intermittent every 8 hours  furosemide   Injectable 40 milliGRAM(s) IV Push two times a day  heparin  Infusion.  Unit(s)/Hr IV Continuous <Continuous>  heparin  Injectable 4000 Unit(s) IV Push every 6 hours PRN  levothyroxine 50 MICROGram(s) Oral daily  losartan 25 milliGRAM(s) Oral daily  methylPREDNISolone sodium succinate Injectable 40 milliGRAM(s) IV Push every 8 hours  metoprolol     tartrate 12.5 milliGRAM(s) Oral two times a day  tiotropium 18 MICROgram(s) Capsule 1 Capsule(s) Inhalation daily  vancomycin  IVPB 1000 milliGRAM(s) IV Intermittent every 12 hours      SOCIAL HISTORY:  Smoker:    ETOH use:        FAMILY HISTORY:  No pertinent family history in first degree relatives      VITALS:  Vital Signs Last 24 Hrs  T(C): 36.6 (2017 14:00), Max: 37.4 (2017 07:38)  T(F): 97.8 (2017 14:00), Max: 99.4 (2017 07:38)  HR: 74 (2017 14:00) (67 - 81)  BP: 139/64 (2017 14:00) (111/56 - 139/64)  BP(mean): 79 (2017 14:00) (66 - 79)  RR: 26 (2017 14:00) (18 - 39)  SpO2: 95% (2017 14:00) (91% - 100%)    LABS/DIAGNOSTIC TESTS:                          14.4   15.8  )-----------( 292      ( 2017 05:00 )             46.6     WBC Count: 15.8 K/uL ( @ 05:00)          142  |  105  |  27<H>  ----------------------------<  220<H>  4.3   |  31  |  1.23    Ca    9.2      2017 05:00    TPro  7.4  /  Alb  2.9<L>  /  TBili  0.4  /  DBili  x   /  AST  22  /  ALT  28  /  AlkPhos  137<H>        Urinalysis Basic - ( 2017 08:14 )    Color: Yellow / Appearance: Clear / S.010 / pH: x  Gluc: x / Ketone: Negative  / Bili: Negative / Urobili: Negative   Blood: x / Protein: Negative / Nitrite: Negative   Leuk Esterase: Negative / RBC: 0-2 /HPF / WBC 0-2 /HPF   Sq Epi: x / Non Sq Epi: Occasional /HPF / Bacteria: Many /HPF        LIVER FUNCTIONS - ( 2017 05:00 )  Alb: 2.9 g/dL / Pro: 7.4 g/dL / ALK PHOS: 137 U/L / ALT: 28 U/L DA / AST: 22 U/L / GGT: x             PT/INR - ( 2017 05:00 )   PT: 14.7 sec;   INR: 1.34 ratio         PTT - ( 2017 05:00 )  PTT:30.7 sec    LACTATE:Lactate, Blood: 2.3 mmol/L ( @ 05:04)      ABG - ABG - ( 2017 05:15 )  pH: 7.35  /  pCO2: 48    /  pO2: 132   / HCO3: 26    / Base Excess: 0.5   /  SaO2: 98                  CULTURES:       RADIOLOGY:< from: Xray Chest 1 View AP-PORTABLE IMMEDIATE (17 @ 07:21) >  EXAM:  CHEST-PORTABLE STAT                            PROCEDURE DATE:  2017          INTERPRETATION:  CLINICAL INFORMATION: Shortness of breath.    A single portable view of the chest was obtained.     Comparison: None.     The mediastinal cardiac silhouette is unremarkable. Aortic calcifications.    Mild diffuse reticular pattern with a perihilar distribution likely   vascular. Small left effusion. Question pulmonary edema.    No acute osseous finding.     IMPRESSION:    Findings as above.    < end of copied text >        ROS  [  ] UNABLE TO ELICIT HPI:  89 y/o F pt from [facility] with PMHx of AFib, Dementia, Heart Failure, HLD, HTN, Hypothyroidism, NSTEMI, Osteoporosis, and COPD and no significant PSHx BIB EMS from nursing home to ED with difficulty breathing noted today. Per EMS, pt with O2Sat of 52% on room air; pt was placed on a CPAP en route to ED. EMS reports no medications were given to pt during the transport to ED. Pt is DNR/DNI on MOLST, per shelter paperwork and confirmed with pt and his son. he  PMD: Dr. Topete.  Pt denies fever, chest pain, Abd pain, nausea, vomiting, or any other complaints. Pt is currently in the ICU , she is on a nasal cannula and tolerating it well. she c/o a cough with sputum production.   pt is mildly confused currently.        PAST MEDICAL & SURGICAL HISTORY:  Hypothyroidism  HLD (hyperlipidemia)  Osteoporosis  Dementia  HTN (hypertension)  Heart failure  NSTEMI (non-ST elevated myocardial infarction)  Atrial fibrillation  COPD (chronic obstructive pulmonary disease)  No significant past surgical history      penicillin (Unknown)      Meds:  ALBUTerol    90 MICROgram(s) HFA Inhaler 1 Puff(s) Inhalation every 6 hours  amiodarone    Tablet 200 milliGRAM(s) Oral two times a day  atorvastatin 80 milliGRAM(s) Oral at bedtime  aztreonam  IVPB 1000 milliGRAM(s) IV Intermittent every 8 hours  furosemide   Injectable 40 milliGRAM(s) IV Push two times a day  heparin  Infusion.  Unit(s)/Hr IV Continuous <Continuous>  heparin  Injectable 4000 Unit(s) IV Push every 6 hours PRN  levothyroxine 50 MICROGram(s) Oral daily  losartan 25 milliGRAM(s) Oral daily  methylPREDNISolone sodium succinate Injectable 40 milliGRAM(s) IV Push every 8 hours  metoprolol     tartrate 12.5 milliGRAM(s) Oral two times a day  tiotropium 18 MICROgram(s) Capsule 1 Capsule(s) Inhalation daily  vancomycin  IVPB 1000 milliGRAM(s) IV Intermittent every 12 hours      SOCIAL HISTORY:  Smoker: was a heavy smoker in past but quit 45 yrs ago  ETOH use: she used to drink a single cocktail daily until she ended up in a nursing home.      FAMILY HISTORY:  No pertinent family history in first degree relatives      VITALS:  Vital Signs Last 24 Hrs  T(C): 36.6 (2017 14:00), Max: 37.4 (2017 07:38)  T(F): 97.8 (:00), Max: 99.4 (2017 07:38)  HR: 74 (:00) (67 - 81)  BP: 139/64 (:00) (111/56 - 139/64)  BP(mean): 79 (:00) (66 - 79)  RR: 26 (:00) (18 - 39)  SpO2: 95% (:) (91% - 100%)    LABS/DIAGNOSTIC TESTS:                          14.4   15.8  )-----------( 292      ( 2017 05:00 )             46.6     WBC Count: 15.8 K/uL ( @ 05:00)          142  |  105  |  27<H>  ----------------------------<  220<H>  4.3   |  31  |  1.23    Ca    9.2      2017 05:00    TPro  7.4  /  Alb  2.9<L>  /  TBili  0.4  /  DBili  x   /  AST  22  /  ALT  28  /  AlkPhos  137<H>        Urinalysis Basic - ( 2017 08:14 )    Color: Yellow / Appearance: Clear / S.010 / pH: x  Gluc: x / Ketone: Negative  / Bili: Negative / Urobili: Negative   Blood: x / Protein: Negative / Nitrite: Negative   Leuk Esterase: Negative / RBC: 0-2 /HPF / WBC 0-2 /HPF   Sq Epi: x / Non Sq Epi: Occasional /HPF / Bacteria: Many /HPF        LIVER FUNCTIONS - ( 2017 05:00 )  Alb: 2.9 g/dL / Pro: 7.4 g/dL / ALK PHOS: 137 U/L / ALT: 28 U/L DA / AST: 22 U/L / GGT: x             PT/INR - ( 2017 05:00 )   PT: 14.7 sec;   INR: 1.34 ratio         PTT - ( 2017 05:00 )  PTT:30.7 sec    LACTATE:Lactate, Blood: 2.3 mmol/L ( @ 05:04)      ABG - ABG - ( 2017 05:15 )  pH: 7.35  /  pCO2: 48    /  pO2: 132   / HCO3: 26    / Base Excess: 0.5   /  SaO2: 98                  CULTURES:       RADIOLOGY:< from: Xray Chest 1 View AP-PORTABLE IMMEDIATE (17 @ 07:21) >  EXAM:  CHEST-PORTABLE STAT                            PROCEDURE DATE:  2017          INTERPRETATION:  CLINICAL INFORMATION: Shortness of breath.    A single portable view of the chest was obtained.     Comparison: None.     The mediastinal cardiac silhouette is unremarkable. Aortic calcifications.    Mild diffuse reticular pattern with a perihilar distribution likely   vascular. Small left effusion. Question pulmonary edema.    No acute osseous finding.     IMPRESSION:    Findings as above.    < end of copied text >        ROS  [  ] UNABLE TO ELICIT

## 2017-11-12 NOTE — PROGRESS NOTE ADULT - PROBLEM SELECTOR PLAN 1
Pt has SOB and b/l 1+ pitting edema, with hypoxia elevated pro BNP 12,000 and mildly congested CXR with due to Acute hypoxic Hypercapneic resp failure sec to Acute on Chronic CHF exacerbation and COPD exacerbation likely sec to HCAP/ Acute Bronchitis given leukocytosis requiring new Bipap.  Admit to ICU for new bipap  pH initially 7.19 improved to 7.35 on bipap  will give LASIX 40 MG iv daily  Input and output monitoring, daily weights  Will give solumedrol 40 mg q8 and duonebs for copd exacerbation  will give vanco, azactam as pen allergic for possible HCAP  f/u RVP, Procalcitonin  continue lopressor, losartan, ASA, Lipitor and Heparin drip for NSTEMI.  EKG showed deep T waves in anterolateral leads sinus with PAC @ 70 BPM, .  f/u ECHO  T1 0.175, f/u T2 and T3 to r/o ACS  Cardio Dr. Dailey consulted

## 2017-11-12 NOTE — CONSULT NOTE ADULT - SUBJECTIVE AND OBJECTIVE BOX
HISTORY OF PRESENT ILLNESS: HPI:  89 y/o F pt from [facility] with PMHx of AFib, Dementia, Heart Failure, HLD, HTN, Hypothyroidism, NSTEMI, Osteoporosis, and COPD and no significant PSHx BIB EMS from nursing home to ED with difficulty breathing noted today. Per EMS, pt with O2Sat of 52% on room air; pt was placed on a CPAP en route to ED. EMS reports no medications were given to pt during the transport to ED. Pt is DNR/DNI on MOLST, per shelter paperwork and confirmed with pt and his son. Pt unable to provide detailed Hx and HPI due to condition (respiratory distress). PMD: Dr. Topete.  Pt denies fever, chest pain, Abd pain, nausea, vomiting, or any other complaints. Pt is allergic to Penicillin.  At NH RR 30, SpO2 55-85% on RA, T 97 F, HR 85, /90.  In ED pt RR 28, SpO2 91%, saturating 98% on 40% b 98%. Pt is Alert and orientedx2.  Now on Bipap    PAST MEDICAL & SURGICAL HISTORY:  Hypothyroidism  HLD (hyperlipidemia)  Osteoporosis  Dementia  HTN (hypertension)  Heart failure  NSTEMI (non-ST elevated myocardial infarction)  Atrial fibrillation  COPD (chronic obstructive pulmonary disease)  No significant past surgical history          MEDICATIONS:  MEDICATIONS  (STANDING):  ALBUTerol    90 MICROgram(s) HFA Inhaler 1 Puff(s) Inhalation every 6 hours  amiodarone    Tablet 200 milliGRAM(s) Oral two times a day  atorvastatin 80 milliGRAM(s) Oral at bedtime  aztreonam  IVPB 1000 milliGRAM(s) IV Intermittent every 8 hours  furosemide   Injectable 40 milliGRAM(s) IV Push daily  heparin  Infusion.  Unit(s)/Hr (8 mL/Hr) IV Continuous <Continuous>  levothyroxine 50 MICROGram(s) Oral daily  losartan 25 milliGRAM(s) Oral daily  methylPREDNISolone sodium succinate Injectable 40 milliGRAM(s) IV Push every 8 hours  metoprolol     tartrate 12.5 milliGRAM(s) Oral two times a day  tiotropium 18 MICROgram(s) Capsule 1 Capsule(s) Inhalation daily  vancomycin  IVPB 1000 milliGRAM(s) IV Intermittent every 12 hours      Allergies    penicillin (Unknown)    Intolerances        FAMILY HISTORY:  No pertinent family history in first degree relatives    Non-contributary for premature coronary disease or sudden cardiac death    SOCIAL HISTORY:    [X ] Non-smoker  [ ] Smoker  [ ] Alcohol      REVIEW OF SYSTEMS:  [ ]chest pain  [  ]shortness of breath  [  ]palpitations  [  ]syncope  [ ]near syncope [ ]upper extremity weakness   [ ] lower extremity weakness  [  ]diplopia  [  ]altered mental status   [  ]fevers  [ ]chills [ ]nausea  [ ]vomitting  [  ]dysphagia    [ ]abdominal pain  [ ]melena  [ ]BRBPR    [  ]epistaxis  [  ]rash    [ ]lower extremity edema        [ ] All others negative	  [ X] Unable to obtain    PHYSICAL EXAM:  T(C): 36.3 (11-12-17 @ 09:00), Max: 37.4 (11-12-17 @ 07:38)  HR: 68 (11-12-17 @ 09:00) (67 - 81)  BP: 111/56 (11-12-17 @ 09:00) (111/56 - 137/68)  RR: 23 (11-12-17 @ 09:00) (18 - 28)  SpO2: 95% (11-12-17 @ 09:00) (91% - 99%)  Wt(kg): --  I&O's Summary    12 Nov 2017 07:01  -  12 Nov 2017 09:50  --------------------------------------------------------  IN: 8 mL / OUT: 400 mL / NET: -392 mL          HEENT:   Normal oral mucosa, PERRL, EOMI	  Lymphatic: No obvious lymphadenopathy , no edema  Cardiovascular: Normal S1 S2, No JVD,  1/6 SHOBHA murmur , Peripheral pulses palpable 2+ bilaterally  Respiratory: Lungs clear to auscultation, normal effort 	  Gastrointestinal:  Soft, Non-tender, + BS	  Skin: No rashes, No cyanosis, warm to touch  Musculoskeletal: Normal range of motion, normal strength  Psychiatry:  Appropriate Mood & affect     TELEMETRY: 	  sinus  ECG:  	Sinus 70 BPM, LVH with repolarization abnormality  RADIOLOGY:     CXR : ?CHF    LABS:	 	    CARDIAC MARKERS:  CARDIAC MARKERS ( 12 Nov 2017 05:00 )  0.175 ng/mL / x     / x     / x     / x                                  14.4   15.8  )-----------( 292      ( 12 Nov 2017 05:00 )             46.6     Hb Trend: 14.4<--    11-12    142  |  105  |  27<H>  ----------------------------<  220<H>  4.3   |  31  |  1.23    Ca    9.2      12 Nov 2017 05:00    TPro  7.4  /  Alb  2.9<L>  /  TBili  0.4  /  DBili  x   /  AST  22  /  ALT  28  /  AlkPhos  137<H>  11-12    Creatinine Trend: 1.23<--    Coags:  PT/INR - ( 12 Nov 2017 05:00 )   PT: 14.7 sec;   INR: 1.34 ratio         PTT - ( 12 Nov 2017 05:00 )  PTT:30.7 sec    proBNP: Serum Pro-Brain Natriuretic Peptide: 33476 pg/mL (11-12 @ 05:00)      ASSESSMENT/PLAN: 	89y Female AFib on eliquis and amio, Dementia, Heart Failure, HLD, HTN, Hypothyroidism, NSTEMI, Osteoporosis, and COPD admitted with respiratory distress, CHF exacerbation.    - keep O>I  - Echo  - Cont heparin gtt while off eliquis  - Further recc pending above    Darin Dailey MD, FACC  [facility]  [address] [place] [address]  PHONE:  [phone]  BEEPER : [phone]

## 2017-11-12 NOTE — H&P ADULT - RS GEN PE MLT RESP DETAILS PC
clear to auscultation bilaterally/no rhonchi/no wheezes/no rales/no chest wall tenderness/breath sounds equal no wheezes/rales/no chest wall tenderness/breath sounds equal

## 2017-11-12 NOTE — ED PROVIDER NOTE - PMH
Atrial fibrillation    COPD (chronic obstructive pulmonary disease)    Dementia    Heart failure    HLD (hyperlipidemia)    HTN (hypertension)    NSTEMI (non-ST elevated myocardial infarction)    Osteoporosis Atrial fibrillation    COPD (chronic obstructive pulmonary disease)    Dementia    Heart failure    HLD (hyperlipidemia)    HTN (hypertension)    Hypothyroidism    NSTEMI (non-ST elevated myocardial infarction)    Osteoporosis

## 2017-11-12 NOTE — H&P ADULT - PROBLEM SELECTOR PLAN 1
: Pt has SOB and b/l 1+ pitting edema, with hypoxia elevated pro BNP 12,000 and mildly congested CXR with due to Acute hypoxic Hypercapneic resp failure sec to Acute on Chronic CHF exacerbation and COPD exacerbation likely sec to HCAP/ Acute Bronchitis given leukocytosis requiring new Bipap.  Admit to ICU for new bipap  will give LASIX 40 MG iv daily  pt is urinating after lasix IV in ED ordered quigley for urine output monitoring.  Input and output monitoring, daily weights  continue lopressor, losartan, ASA, Lipitor and Heparin drip for NSTEMI.  EKG showed deep T waves in anterolateral leads sinus with PAC @ 70 BPM.  f/u ECHO  T1 0.175, f/u T2 and T3 to r/o ACS  Cardio Dr. Mosqueda  f/u PT eval.      Problem/Plan - 2:  ·  Problem: Atrial fibrillation, unspecified type.  Plan: Pt has Afib, rate controlled on Lopressor and Amiodarone  continue coumadin 2.5 mg qhs  INR therapeutic  f/u PT/INR. Pt has SOB and b/l 1+ pitting edema, with hypoxia elevated pro BNP 12,000 and mildly congested CXR with due to Acute hypoxic Hypercapneic resp failure sec to Acute on Chronic CHF exacerbation and COPD exacerbation likely sec to HCAP/ Acute Bronchitis given leukocytosis requiring new Bipap.  Admit to ICU for new bipap  will give LASIX 40 MG iv daily  pt is urinating after lasix IV in ED ordered quigley for urine output monitoring.  Input and output monitoring, daily weights  continue lopressor, losartan, ASA, Lipitor and Heparin drip for NSTEMI.  EKG showed deep T waves in anterolateral leads sinus with PAC @ 70 BPM.  f/u ECHO  T1 0.175, f/u T2 and T3 to r/o ACS  Cardio Dr. Mosqueda  f/u PT eval.      Problem/Plan - 2:  ·  Problem: Atrial fibrillation, unspecified type.  Plan: Pt has Afib, rate controlled on Lopressor and Amiodarone  continue coumadin 2.5 mg qhs  INR therapeutic  f/u PT/INR. Pt has SOB and b/l 1+ pitting edema, with hypoxia elevated pro BNP 12,000 and mildly congested CXR with due to Acute hypoxic Hypercapneic resp failure sec to Acute on Chronic CHF exacerbation and COPD exacerbation likely sec to HCAP/ Acute Bronchitis given leukocytosis requiring new Bipap.  Admit to ICU for new bipap  will give LASIX 40 MG iv daily  Input and output monitoring, daily weights  Will give solumedrol 40 mg q8 and duonebs for copd exacerbation  f/u RVP, Procalcitonin  continue lopressor, losartan, ASA, Lipitor and Heparin drip for NSTEMI.  EKG showed deep T waves in anterolateral leads sinus with PAC @ 70 BPM, .  f/u ECHO  T1 0.175, f/u T2 and T3 to r/o ACS  Cardio Dr. Dailey consulted Pt has SOB and b/l 1+ pitting edema, with hypoxia elevated pro BNP 12,000 and mildly congested CXR with due to Acute hypoxic Hypercapneic resp failure sec to Acute on Chronic CHF exacerbation and COPD exacerbation likely sec to HCAP/ Acute Bronchitis given leukocytosis requiring new Bipap.  Admit to ICU for new bipap  pH initially 7.19 improved to 7.35 on bipap  will give LASIX 40 MG iv daily  Input and output monitoring, daily weights  Will give solumedrol 40 mg q8 and duonebs for copd exacerbation  f/u RVP, Procalcitonin  continue lopressor, losartan, ASA, Lipitor and Heparin drip for NSTEMI.  EKG showed deep T waves in anterolateral leads sinus with PAC @ 70 BPM, .  f/u ECHO  T1 0.175, f/u T2 and T3 to r/o ACS  Cardio Dr. Dailey consulted Pt has SOB and b/l 1+ pitting edema, with hypoxia elevated pro BNP 12,000 and mildly congested CXR with due to Acute hypoxic Hypercapneic resp failure sec to Acute on Chronic CHF exacerbation and COPD exacerbation likely sec to HCAP/ Acute Bronchitis given leukocytosis requiring new Bipap.  Admit to ICU for new bipap  pH initially 7.19 improved to 7.35 on bipap  will give LASIX 40 MG iv daily  Input and output monitoring, daily weights  Will give solumedrol 40 mg q8 and duonebs for copd exacerbation  will give vanco, azactam as pen allergic for possible HCAP  f/u RVP, Procalcitonin  continue lopressor, losartan, ASA, Lipitor and Heparin drip for NSTEMI.  EKG showed deep T waves in anterolateral leads sinus with PAC @ 70 BPM, .  f/u ECHO  T1 0.175, f/u T2 and T3 to r/o ACS  Cardio Dr. Dailey consulted

## 2017-11-12 NOTE — H&P ADULT - NSHPLABSRESULTS_GEN_ALL_CORE
Blood Gas Profile - Arterial (11.12.17 @ 05:15)    pH, Arterial: 7.35    pCO2, Arterial: 48 mmHg    pO2, Arterial: 132 mmHg    HCO3, Arterial: 26 mmol/L    Base Excess, Arterial: 0.5 mmol/L    Oxygen Saturation, Arterial: 98 %    FIO2, Arterial: 40.0    Blood Gas Comments Arterial: BIPAP 12/6 40% (L. Radial)  Complete Blood Count + Automated Diff (11.12.17 @ 05:00)    WBC Count: 15.8 K/uL    RBC Count: 4.71 M/uL    Hemoglobin: 14.4 g/dL    Hematocrit: 46.6 %    Mean Cell Volume: 98.9 fl    Mean Cell Hemoglobin: 30.5 pg    Mean Cell Hemoglobin Conc: 30.8 gm/dL    Red Cell Distrib Width: 12.3 %    Platelet Count - Automated: 292 K/uL    Auto Neutrophil #: 13.5 K/uL    Auto Lymphocyte #: 1.1 K/uL    Auto Monocyte #: 0.8 K/uL    Auto Eosinophil #: 0.3 K/uL    Auto Basophil #: 0.2 K/uL    Auto Neutrophil %: 85.7: Differential percentages must be correlated with absolute numbers for  clinical significance. %    Auto Lymphocyte %: 6.9 %    Auto Monocyte %: 4.9 %    Auto Eosinophil %: 1.6 %    Auto Basophil %: 1.0 %  Comprehensive Metabolic Panel (11.12.17 @ 05:00)    Sodium, Serum: 142 mmol/L    Potassium, Serum: 4.3 mmol/L    Chloride, Serum: 105 mmol/L    Carbon Dioxide, Serum: 31 mmol/L    Anion Gap, Serum: 6 mmol/L    Blood Urea Nitrogen, Serum: 27 mg/dL    Creatinine, Serum: 1.23 mg/dL    Glucose, Serum: 220 mg/dL    Calcium, Total Serum: 9.2 mg/dL    Protein Total, Serum: 7.4 g/dL    Albumin, Serum: 2.9 g/dL    Bilirubin Total, Serum: 0.4 mg/dL    Alkaline Phosphatase, Serum: 137 U/L    Aspartate Aminotransferase (AST/SGOT): 22 U/L    Alanine Aminotransferase (ALT/SGPT): 28 U/L DA    eGFR if Non : 39: Interpretative comment  The units for eGFR are ml/min/1.73m2 (normalized body surface area). The  eGFR is calculated from a serum creatinine using the CKD-EPI equation.  Other variables required for calculation are race, age and sex. Among  patients with chronic kidney disease (CKD), the eGFR is useful in  determining the stage of disease according to KDOQI CKD classification.  All eGFR results are reported numerically with the following  interpretation.          GFR                    With                 Without     (ml/min/1.73 m2)    Kidney Damage       Kidney Damage        >= 90                    Stage 1                     Normal        60-89                    Stage 2                     Decreased GFR        30-59     Stage 3                     Stage 3        15-29                    Stage 4                     Stage 4        < 15                      Stage 5                     Stage 5  Each stage of CKD assumes that the associated GFR level has been in  effect for at least 3 months. Determination of stages one and two (with  eGFR > 59 ml/min/m2) requires estimation of kidney damage for at least 3  months as defined by structural or functional abnormalities.  Limitations: All estimates of GFR will be less accurate for patients at  extremes of muscle mass (including but not limited to frail elderly,  critically ill, or cancer patients), those with unusual diets, and those  with conditions associated with reduced secretion or extrarenal  elimination of creatinine. The eGFR equation is not recommended for use  in patients with unstable creatinine levels. mL/min/1.73M2    eGFR if African American: 45 mL/min/1.73M2    cxr: FILM SHOWED MILD CONGESTION AND SMALL LEFT PLEURAL EFFUSION, F/U OFFICIAL REPORT

## 2017-11-13 LAB
ANION GAP SERPL CALC-SCNC: 13 MMOL/L — SIGNIFICANT CHANGE UP (ref 5–17)
APTT BLD: 34.9 SEC — SIGNIFICANT CHANGE UP (ref 27.5–37.4)
APTT BLD: 90.2 SEC — HIGH (ref 27.5–37.4)
APTT BLD: 98.5 SEC — HIGH (ref 27.5–37.4)
BUN SERPL-MCNC: 34 MG/DL — HIGH (ref 7–18)
CALCIUM SERPL-MCNC: 9.1 MG/DL — SIGNIFICANT CHANGE UP (ref 8.4–10.5)
CHLORIDE SERPL-SCNC: 102 MMOL/L — SIGNIFICANT CHANGE UP (ref 96–108)
CK MB BLD-MCNC: 6.5 % — HIGH (ref 0–3.5)
CK MB BLD-MCNC: 6.8 % — HIGH (ref 0–3.5)
CK MB CFR SERPL CALC: 4.3 NG/ML — HIGH (ref 0–3.6)
CK MB CFR SERPL CALC: 4.3 NG/ML — HIGH (ref 0–3.6)
CK SERPL-CCNC: 63 U/L — SIGNIFICANT CHANGE UP (ref 21–215)
CK SERPL-CCNC: 66 U/L — SIGNIFICANT CHANGE UP (ref 21–215)
CO2 SERPL-SCNC: 24 MMOL/L — SIGNIFICANT CHANGE UP (ref 22–31)
CREAT SERPL-MCNC: 1.16 MG/DL — SIGNIFICANT CHANGE UP (ref 0.5–1.3)
CULTURE RESULTS: NO GROWTH — SIGNIFICANT CHANGE UP
GLUCOSE SERPL-MCNC: 162 MG/DL — HIGH (ref 70–99)
HCT VFR BLD CALC: 40.3 % — SIGNIFICANT CHANGE UP (ref 34.5–45)
HCT VFR BLD CALC: 42.2 % — SIGNIFICANT CHANGE UP (ref 34.5–45)
HGB BLD-MCNC: 12.7 G/DL — SIGNIFICANT CHANGE UP (ref 11.5–15.5)
HGB BLD-MCNC: 13.6 G/DL — SIGNIFICANT CHANGE UP (ref 11.5–15.5)
INR BLD: 1.25 RATIO — HIGH (ref 0.88–1.16)
INR BLD: 1.25 RATIO — HIGH (ref 0.88–1.16)
LACTATE SERPL-SCNC: 1.6 MMOL/L — SIGNIFICANT CHANGE UP (ref 0.7–2)
LACTATE SERPL-SCNC: 1.9 MMOL/L — SIGNIFICANT CHANGE UP (ref 0.7–2)
MAGNESIUM SERPL-MCNC: 2.3 MG/DL — SIGNIFICANT CHANGE UP (ref 1.6–2.6)
MCHC RBC-ENTMCNC: 30.6 PG — SIGNIFICANT CHANGE UP (ref 27–34)
MCHC RBC-ENTMCNC: 30.8 PG — SIGNIFICANT CHANGE UP (ref 27–34)
MCHC RBC-ENTMCNC: 31.5 GM/DL — LOW (ref 32–36)
MCHC RBC-ENTMCNC: 32.2 GM/DL — SIGNIFICANT CHANGE UP (ref 32–36)
MCV RBC AUTO: 95.8 FL — SIGNIFICANT CHANGE UP (ref 80–100)
MCV RBC AUTO: 97.1 FL — SIGNIFICANT CHANGE UP (ref 80–100)
PHOSPHATE SERPL-MCNC: 4.1 MG/DL — SIGNIFICANT CHANGE UP (ref 2.5–4.5)
PLATELET # BLD AUTO: 253 K/UL — SIGNIFICANT CHANGE UP (ref 150–400)
PLATELET # BLD AUTO: 302 K/UL — SIGNIFICANT CHANGE UP (ref 150–400)
POTASSIUM SERPL-MCNC: 3.6 MMOL/L — SIGNIFICANT CHANGE UP (ref 3.5–5.3)
POTASSIUM SERPL-SCNC: 3.6 MMOL/L — SIGNIFICANT CHANGE UP (ref 3.5–5.3)
PROCALCITONIN SERPL-MCNC: 0.65 NG/ML — HIGH (ref 0–0.04)
PROTHROM AB SERPL-ACNC: 13.7 SEC — HIGH (ref 9.8–12.7)
PROTHROM AB SERPL-ACNC: 13.7 SEC — HIGH (ref 9.8–12.7)
RBC # BLD: 4.15 M/UL — SIGNIFICANT CHANGE UP (ref 3.8–5.2)
RBC # BLD: 4.41 M/UL — SIGNIFICANT CHANGE UP (ref 3.8–5.2)
RBC # FLD: 12.4 % — SIGNIFICANT CHANGE UP (ref 10.3–14.5)
RBC # FLD: 12.5 % — SIGNIFICANT CHANGE UP (ref 10.3–14.5)
SODIUM SERPL-SCNC: 139 MMOL/L — SIGNIFICANT CHANGE UP (ref 135–145)
SPECIMEN SOURCE: SIGNIFICANT CHANGE UP
TROPONIN I SERPL-MCNC: 0.13 NG/ML — HIGH (ref 0–0.04)
TROPONIN I SERPL-MCNC: 0.14 NG/ML — HIGH (ref 0–0.04)
WBC # BLD: 10 K/UL — SIGNIFICANT CHANGE UP (ref 3.8–10.5)
WBC # BLD: 14.8 K/UL — HIGH (ref 3.8–10.5)
WBC # FLD AUTO: 10 K/UL — SIGNIFICANT CHANGE UP (ref 3.8–10.5)
WBC # FLD AUTO: 14.8 K/UL — HIGH (ref 3.8–10.5)

## 2017-11-13 PROCEDURE — 71010: CPT | Mod: 26

## 2017-11-13 RX ORDER — APIXABAN 2.5 MG/1
2.5 TABLET, FILM COATED ORAL EVERY 12 HOURS
Qty: 0 | Refills: 0 | Status: DISCONTINUED | OUTPATIENT
Start: 2017-11-13 | End: 2017-11-22

## 2017-11-13 RX ORDER — DIGOXIN 250 MCG
0.5 TABLET ORAL ONCE
Qty: 0 | Refills: 0 | Status: DISCONTINUED | OUTPATIENT
Start: 2017-11-13 | End: 2017-11-13

## 2017-11-13 RX ORDER — METOPROLOL TARTRATE 50 MG
12.5 TABLET ORAL ONCE
Qty: 0 | Refills: 0 | Status: COMPLETED | OUTPATIENT
Start: 2017-11-13 | End: 2017-11-13

## 2017-11-13 RX ORDER — METOPROLOL TARTRATE 50 MG
5 TABLET ORAL ONCE
Qty: 0 | Refills: 0 | Status: COMPLETED | OUTPATIENT
Start: 2017-11-13 | End: 2017-11-13

## 2017-11-13 RX ORDER — PANTOPRAZOLE SODIUM 20 MG/1
40 TABLET, DELAYED RELEASE ORAL
Qty: 0 | Refills: 0 | Status: DISCONTINUED | OUTPATIENT
Start: 2017-11-13 | End: 2017-11-22

## 2017-11-13 RX ORDER — DIGOXIN 250 MCG
0.25 TABLET ORAL DAILY
Qty: 0 | Refills: 0 | Status: DISCONTINUED | OUTPATIENT
Start: 2017-11-13 | End: 2017-11-13

## 2017-11-13 RX ORDER — DIGOXIN 250 MCG
0.5 TABLET ORAL ONCE
Qty: 0 | Refills: 0 | Status: COMPLETED | OUTPATIENT
Start: 2017-11-13 | End: 2017-11-13

## 2017-11-13 RX ORDER — METOPROLOL TARTRATE 50 MG
25 TABLET ORAL
Qty: 0 | Refills: 0 | Status: DISCONTINUED | OUTPATIENT
Start: 2017-11-13 | End: 2017-11-13

## 2017-11-13 RX ORDER — METOPROLOL TARTRATE 50 MG
25 TABLET ORAL
Qty: 0 | Refills: 0 | Status: DISCONTINUED | OUTPATIENT
Start: 2017-11-14 | End: 2017-11-14

## 2017-11-13 RX ADMIN — Medication 12.5 MILLIGRAM(S): at 19:44

## 2017-11-13 RX ADMIN — PANTOPRAZOLE SODIUM 40 MILLIGRAM(S): 20 TABLET, DELAYED RELEASE ORAL at 13:37

## 2017-11-13 RX ADMIN — LOSARTAN POTASSIUM 25 MILLIGRAM(S): 100 TABLET, FILM COATED ORAL at 05:03

## 2017-11-13 RX ADMIN — Medication 50 MILLIGRAM(S): at 13:37

## 2017-11-13 RX ADMIN — Medication 3 MILLILITER(S): at 02:02

## 2017-11-13 RX ADMIN — Medication 40 MILLIGRAM(S): at 05:03

## 2017-11-13 RX ADMIN — HEPARIN SODIUM 600 UNIT(S)/HR: 5000 INJECTION INTRAVENOUS; SUBCUTANEOUS at 00:03

## 2017-11-13 RX ADMIN — ATORVASTATIN CALCIUM 80 MILLIGRAM(S): 80 TABLET, FILM COATED ORAL at 21:51

## 2017-11-13 RX ADMIN — Medication 12.5 MILLIGRAM(S): at 14:44

## 2017-11-13 RX ADMIN — Medication 50 MILLIGRAM(S): at 21:51

## 2017-11-13 RX ADMIN — Medication 150 MILLIGRAM(S): at 18:31

## 2017-11-13 RX ADMIN — Medication 50 MICROGRAM(S): at 05:03

## 2017-11-13 RX ADMIN — Medication 150 MILLIGRAM(S): at 05:04

## 2017-11-13 RX ADMIN — Medication 5 MILLIGRAM(S): at 04:43

## 2017-11-13 RX ADMIN — Medication 5 MILLIGRAM(S): at 20:53

## 2017-11-13 RX ADMIN — Medication 40 MILLIGRAM(S): at 13:37

## 2017-11-13 RX ADMIN — HEPARIN SODIUM 800 UNIT(S)/HR: 5000 INJECTION INTRAVENOUS; SUBCUTANEOUS at 07:28

## 2017-11-13 RX ADMIN — AMIODARONE HYDROCHLORIDE 200 MILLIGRAM(S): 400 TABLET ORAL at 05:03

## 2017-11-13 RX ADMIN — Medication 12.5 MILLIGRAM(S): at 17:32

## 2017-11-13 RX ADMIN — Medication 50 MILLIGRAM(S): at 05:03

## 2017-11-13 RX ADMIN — Medication 81 MILLIGRAM(S): at 11:19

## 2017-11-13 RX ADMIN — Medication 40 MILLIGRAM(S): at 21:52

## 2017-11-13 RX ADMIN — Medication 5 MILLIGRAM(S): at 22:58

## 2017-11-13 RX ADMIN — APIXABAN 2.5 MILLIGRAM(S): 2.5 TABLET, FILM COATED ORAL at 17:32

## 2017-11-13 RX ADMIN — Medication 40 MILLIGRAM(S): at 17:32

## 2017-11-13 RX ADMIN — Medication 12.5 MILLIGRAM(S): at 05:03

## 2017-11-13 NOTE — PROGRESS NOTE ADULT - SUBJECTIVE AND OBJECTIVE BOX
Patient is a 89y old  Female who presents with a chief complaint of shortness of breath/CHF exac/PNA(?)/shock      INTERVAL HPI/OVERNIGHT EVENTS:  T(C): 35.9 (17 @ 06:00), Max: 36.6 (17 @ 14:00)  HR: 121 (17 @ 09:00) (61 - 132)  BP: 106/59 (17 @ 09:00) (106/59 - 152/63)  RR: 22 (17 @ 09:00) (17 - 39)  SpO2: 96% (17 @ 08:15) (92% - 99%)  Wt(kg): --    LABS:                        13.6   14.8  )-----------( 302      ( 2017 06:18 )             42.2         139  |  102  |  34<H>  ----------------------------<  162<H>  3.6   |  24  |  1.16    Ca    9.1      2017 06:18  Phos  4.1       Mg     2.3         TPro  7.4  /  Alb  2.9<L>  /  TBili  0.4  /  DBili  x   /  AST  22  /  ALT  28  /  AlkPhos  137<H>  11-12    PT/INR - ( 2017 20:53 )   PT: 13.7 sec;   INR: 1.25 ratio         PTT - ( 2017 06:18 )  PTT:34.9 sec  Urinalysis Basic - ( 2017 08:14 )    Color: Yellow / Appearance: Clear / S.010 / pH: x  Gluc: x / Ketone: Negative  / Bili: Negative / Urobili: Negative   Blood: x / Protein: Negative / Nitrite: Negative   Leuk Esterase: Negative / RBC: 0-2 /HPF / WBC 0-2 /HPF   Sq Epi: x / Non Sq Epi: Occasional /HPF / Bacteria: Many /HPF      CAPILLARY BLOOD GLUCOSE        ABG - ( 2017 05:15 )  pH: 7.35  /  pCO2: 48    /  pO2: 132   / HCO3: 26    / Base Excess: 0.5   /  SaO2: 98                  RADIOLOGY & ADDITIONAL TESTS:  < from: Transthoracic Echocardiogram (17 @ 07:37) >  CONCLUSIONS:  1. Densly calcified mitral valve leaflet, mitral annulus  calcification. Mild-moderate mitral regurgitation.  2. Calcified trileaflet aortic valve with normal opening.  Mild aortic regurgitation.  3. Aortic Root: 3.3 cm.  4. Mild left atrial enlargement.  5. Moderate concentric left ventricular hypertrophy.  6. Normal Left Ventricular Systolic Function,  (EF = 55 to  60%)  7. Grade II diastolic dysfunction.  8. Normal right atrium.  9. Right ventricle not well visualized.  10. RA Pressure is 10 mm Hg.  11. RV systolic pressure is 39 mm Hg.  12. There is mild tricuspid regurgitation.  13. Small pericardial effusion. No echocardiographic  evidence of pericardial tamponade.  14. Bilateral pleural effusions.    < end of copied text >    Consultant(s) Notes Reviewed:  [x ] YES  [ ] NO    PHYSICAL EXAM:  GENERAL: well built, well nourished  HEAD:  Atraumatic, Normocephalic  EYES: EOMI, PERRLA, conjunctiva and sclera clear  ENT: No tonsillar erythema, exudates, or enlargement; Moist mucous membranes, Good dentition, No lesions  NECK: Supple, No JVD, Normal thyroid, no enlarged nodes  NERVOUS SYSTEM:  Alert & Oriented X3, Good concentration; Motor Strength 5/5 B/L upper and lower extremities; DTRs 2+ intact and symmetric, sensory intact  CHEST/LUNG: B/L good air entry; No rales, rhonchi, or wheezing  HEART: S1S2 normal, no S3, Regular rate and rhythm; No murmurs, rubs, or gallops  ABDOMEN: Soft, Nontender, Nondistended; Bowel sounds present  EXTREMITIES:  2+ Peripheral Pulses, No clubbing, cyanosis, or edema  LYMPH: No lymphadenopathy noted  SKIN: No rashes or lesions    Care Discussed with Consultants/Other Providers [ x] YES  [ ] NO

## 2017-11-13 NOTE — PROGRESS NOTE ADULT - ATTENDING COMMENTS
Agree with above assessment and plan as outlined above.    - Echo noted  - can switch heparin gtt back to eliquis if no invasive procedures planned  - up titrate Lopressor for rate control  - can D/C amio if pulmonary feels respiratory symptoms are due to amio use    Darin Dailey MD, FACC  [facility], [facility]  [address] [place] [address]  PHONE:  [phone]  BEEPER : [phone]

## 2017-11-13 NOTE — PROGRESS NOTE ADULT - ASSESSMENT
89 y/o F pt from [facility] with PMHx of AFib, Dementia, Heart Failure, HLD, HTN, Hypothyroidism, NSTEMI, Osteoporosis, and COPD and no significant PSHx BIB EMS from nursing home to ED with difficulty breathing noted today. Per EMS, pt with O2Sat of 52% on room air; pt was placed on a CPAP en route to ED. EMS reports no medications were given to pt during the transport to ED. Pt is DNR/DNI on MOLST, per group home paperwork and confirmed with pt and his son. Pt unable to provide detailed Hx and HPI due to condition (respiratory distress). PMD: Dr. Topete.  Pt denies fever, chest pain, Abd pain, nausea, vomiting, or any other complaints. Pt is allergic to Penicillin.  At NH RR 30, SpO2 55-85% on RA, T 97 F, HR 85, /90.  In ED pt RR 28, SpO2 91%, saturating 98% on 40% b 98%. Pt is Alert and orientedx2.  Pt is being admitted to ICU for Acute hypoxic Hypercapneic resp failure sec to Acute on Chronic CHF exacerbation and COPD exacerbation likely sec to HCAP/ Acute Bronchitis given leukocytosis requiring new Bipap.     Problem/Plan - 1:  ·  Problem: Acute respiratory failure with hypoxia and hypercapnia.  Plan: Pt has SOB and b/l 1+ pitting edema, with hypoxia elevated pro BNP 12,000 and mildly congested CXR with due to Acute hypoxic Hypercapneic resp failure sec to Acute on Chronic CHF exacerbation and COPD exacerbation likely sec to HCAP/ Acute Bronchitis given leukocytosis requiring new Bipap.  Admit to ICU for new bipap  pH initially 7.19 improved to 7.35 on bipap  will give LASIX 40 MG iv daily  Input and output monitoring, daily weights  Will give solumedrol 40 mg q8 and duonebs for copd exacerbation  will give vanco, azactam as pen allergic for possible HCAP  f/u RVP, Procalcitonin  continue lopressor, losartan, ASA, Lipitor and Heparin drip for NSTEMI.  EKG showed deep T waves in anterolateral leads sinus with PAC @ 70 BPM, .  f/u ECHO  T1 0.175, f/u T2 and T3 to r/o ACS  Cardio Dr. Dailey consulted.      Problem/Plan - 2:  ·  Problem: NSTEMI (non-ST elevated myocardial infarction).  Plan: Pt has elevated troponin 0.17 and SOB with elevated pro BNP with deep T waves in vel lateral leads consistent with NSTEMI and CHF exacerbtaion  Started on heparin drip  s/p  MG  C/W asa, Lipiotr, lopressor  f/u ECHO  Trend CE X3  Cardio Dr. Dailey.      Problem/Plan - 3:  ·  Problem: Atrial fibrillation, unspecified type.  Plan: Pt has Afib, rate controlled on Lopressor and Amiodarone.  holding eliquis as on heparin drip.      Problem/Plan - 4:  ·  Problem: Prophylactic measure.  Plan: IMPROVE score 2, on heparin drip. 89 y/o F pt from [facility] with PMHx of AFib, Dementia, Heart Failure, HLD, HTN, Hypothyroidism, NSTEMI, Osteoporosis, and COPD and no significant PSHx BIB EMS from nursing home to ED with difficulty breathing noted today. Per EMS, pt with O2Sat of 52% on room air; pt was placed on a CPAP en route to ED. EMS reports no medications were given to pt during the transport to ED. Pt is DNR/DNI on MOLST, per snf paperwork and confirmed with pt and his son. Pt unable to provide detailed Hx and HPI due to condition (respiratory distress). PMD: Dr. Topete.  Pt denies fever, chest pain, Abd pain, nausea, vomiting, or any other complaints. Pt is allergic to Penicillin.  At NH RR 30, SpO2 55-85% on RA, T 97 F, HR 85, /90.  In ED pt RR 28, SpO2 91%, saturating 98% on 40% b 98%. Pt is Alert and orientedx2.  Pt is being admitted to ICU for Acute hypoxic Hypercapneic resp failure sec to Acute on Chronic CHF exacerbation and COPD exacerbation likely sec to HCAP/ Acute Bronchitis given leukocytosis requiring new Bipap.       Problem/Plan - 1:  ·  Problem: Acute respiratory failure with hypoxia and hypercapnia.  Plan: Pt has SOB and b/l 1+ pitting edema, with hypoxia elevated pro BNP 12,000 and mildly congested CXR with due to Acute hypoxic Hypercapneic resp failure sec to Acute on Chronic CHF exacerbation and COPD exacerbation likely sec to HCAP/ Acute Bronchitis given leukocytosis requiring new Bipap.  Admitted to ICU for new bipap  Patient did not use BIPAP overnight ; saturating well on room air  c/w LASIX 40 MG iv daily  Input and output monitoring, daily weights  c/w solumedrol 40 mg q8 and duonebs for copd exacerbation  will give vanco, azactam as pen allergic for possible HCAP  continue lopressor, losartan, ASA, Lipitor and Heparin drip for Atrial Fibrillation   f/u ECHO : EF of 55-60 % with grade 2 dd and moderate LVH  Cardio Dr. Dailey consulted.      Problem/Plan - 2:  ·  Problem: NSTEMI (non-ST elevated myocardial infarction).  Plan:  Ruled out  Patient had mildly elevated troponins   Was started on heparin drip  However has a normal ECHO  Must be most likely secondary to demand ischemia   Cardio Dr. Dailey.      Problem/Plan - 3:  ·  Problem: Atrial fibrillation, unspecified type.  Plan: Pt has Afib, rate controlled on Lopressor and Amiodarone.  Amiodarone will be discontinued with concern for pulmonary toxicity   continue with lopressor for rate control  holding eliquis as on heparin drip.      Problem/Plan - 4:  ·  Problem: Prophylactic measure.  Plan: IMPROVE score 2, on heparin drip.   Protonix for gi ppx Bill [id number] For Specimen Handling/Conveyance To Laboratory?: no Hemostasis: Aluminum Chloride Notification Instructions: Patient will be notified of biopsy results. However, patient instructed to call the office if not contacted within 2 weeks. Lab: [id number] Detail Level: Detailed Lab Facility: [id number] Type Of Destruction Used: Curettage Biopsy Type: H and E Anesthesia Type: 1% lidocaine with epinephrine Billing Type: Third-Party Bill Dressing: bandage Render Post-Care Instructions In Note?: yes Post-Care Instructions: I reviewed with the patient in detail post-care instructions. Patient is to keep the biopsy site dry overnight, and then apply bacitracin twice daily until healed. Patient may apply hydrogen peroxide soaks to remove any crusting. Size Of Lesion In Cm: 0 Consent: Written consent was obtained and risks were reviewed including but not limited to scarring, infection, bleeding, scabbing, incomplete removal, nerve damage and allergy to anesthesia. Body Location Override (Optional - Billing Will Still Be Based On Selected Body Map Location If Applicable): midline parietal scalp Wound Care: Vaseline Curettage Text: The wound bed was treated with curettage after the biopsy was performed. Electrodesiccation Text: The wound bed was treated with electrodesiccation after the biopsy was performed. Anesthesia Volume In Cc: 0.5 Biopsy Method: Dermablade Cryotherapy Text: The wound bed was treated with cryotherapy after the biopsy was performed. Silver Nitrate Text: The wound bed was treated with silver nitrate after the biopsy was performed. Electrodesiccation And Curettage Text: The wound bed was treated with electrodesiccation and curettage after the biopsy was performed.

## 2017-11-13 NOTE — PROGRESS NOTE ADULT - ATTENDING COMMENTS
89 y/o F pt from [facility] with PMHx of AFib, Dementia, Heart Failure, HLD, HTN, Hypothyroidism, NSTEMI, Osteoporosis, and COPD and no significant PSHx BIB EMS from nursing home to ED with difficulty breathing noted today. Per EMS, pt with O2Sat of 52% on room air; pt was placed on a CPAP en route to ED. EMS reports no medications were given to pt during the transport to ED. Pt is DNR/DNI on MOLST, per [facility] paperwork and confirmed with pt and his son. Pt unable to provide detailed Hx and HPI due to condition (respiratory distress). PMD: Dr. Topete.  Pt denies fever, chest pain, Abd pain, nausea, vomiting, or any other complaints. Pt is allergic to Penicillin.  At NH RR 30, SpO2 55-85% on RA, T 97 F, HR 85, /90.  In ED pt RR 28, SpO2 91%, saturating 98% on 40% b 98%. Pt is Alert and orientedx2.  Pt is being admitted to ICU for Acute hypoxic Hypercapneic resp failure sec to Acute on Chronic CHF exacerbation and COPD exacerbation likely sec to HCAP/ Acute Bronchitis given leukocytosis requiring new Bipap.     Problem/Plan - 1:  ·  Problem: Acute respiratory failure with hypoxia and hypercapnia.  Plan: Pt has SOB and b/l 1+ pitting edema, with hypoxia elevated pro BNP 12,000 and mildly congested CXR with due to Acute hypoxic Hypercapneic resp failure sec to Acute on Chronic CHF exacerbation and COPD exacerbation likely sec to HCAP/ Acute Bronchitis given leukocytosis requiring new Bipap.  Admit to ICU for new bipap  pH initially 7.19 improved to 7.35 on bipap  will give LASIX 40 MG iv daily  Input and output monitoring, daily weights  Will give solumedrol 40 mg q8 and duonebs for copd exacerbation  will give vanco, azactam as pen allergic for possible HCAP  f/u RVP, Procalcitonin  continue lopressor, losartan, ASA, Lipitor and Heparin drip for NSTEMI.  EKG showed deep T waves in anterolateral leads sinus with PAC @ 70 BPM, .  f/u ECHO  T1 0.175, f/u T2 and T3 to r/o ACS  Cardio Dr. Dailey consulted.      Problem/Plan - 2:  ·  Problem: NSTEMI (non-ST elevated myocardial infarction).  Plan: Pt has elevated troponin 0.17 and SOB with elevated pro BNP with deep T waves in vel lateral leads consistent with NSTEMI and CHF exacerbtaion  Started on heparin drip  s/p  MG  C/W asa, Lipiotr, lopressor  f/u ECHO  Trend CE X3  Cardio Dr. Dailey.      Problem/Plan - 3:  ·  Problem: Atrial fibrillation, unspecified type.  Plan: Pt has Afib, rate controlled on Lopressor and Amiodarone.  holding eliquis as on heparin drip.

## 2017-11-13 NOTE — PROGRESS NOTE ADULT - SUBJECTIVE AND OBJECTIVE BOX
Subjective:  pt seen and examined, no complaints on exam.   pt refused Bipap, pt denies chest pain on exam     ALBUTerol/ipratropium for Nebulization 3 milliLiter(s) Nebulizer every 6 hours  amiodarone    Tablet 200 milliGRAM(s) Oral two times a day  aspirin  chewable 81 milliGRAM(s) Oral daily  atorvastatin 80 milliGRAM(s) Oral at bedtime  aztreonam  IVPB 1000 milliGRAM(s) IV Intermittent every 8 hours  furosemide   Injectable 40 milliGRAM(s) IV Push two times a day  heparin  Infusion. 600 Unit(s)/Hr IV Continuous <Continuous>  heparin  Injectable 4000 Unit(s) IV Push every 6 hours PRN  levothyroxine 50 MICROGram(s) Oral daily  losartan 25 milliGRAM(s) Oral daily  methylPREDNISolone sodium succinate Injectable 40 milliGRAM(s) IV Push every 8 hours  metoprolol     tartrate 12.5 milliGRAM(s) Oral two times a day  vancomycin  IVPB 750 milliGRAM(s) IV Intermittent every 12 hours                            14.4   15.8  )-----------( 292      ( 12 Nov 2017 05:00 )             46.6       Hemoglobin: 14.4 g/dL (11-12 @ 05:00)      11-12    142  |  105  |  27<H>  ----------------------------<  220<H>  4.3   |  31  |  1.23    Ca    9.2      12 Nov 2017 05:00    TPro  7.4  /  Alb  2.9<L>  /  TBili  0.4  /  DBili  x   /  AST  22  /  ALT  28  /  AlkPhos  137<H>  11-12    Creatinine Trend: 1.23<--    COAGS:     CARDIAC MARKERS ( 12 Nov 2017 11:43 )  0.201 ng/mL / x     / 65 U/L / x     / 4.5 ng/mL  CARDIAC MARKERS ( 12 Nov 2017 05:00 )  0.175 ng/mL / x     / x     / x     / x            T(C): 35.9 (11-13-17 @ 06:00), Max: 37.4 (11-12-17 @ 07:38)  HR: 128 (11-13-17 @ 06:00) (61 - 128)  BP: 136/84 (11-13-17 @ 06:00) (110/40 - 152/63)  RR: 23 (11-13-17 @ 06:00) (17 - 39)  SpO2: 96% (11-13-17 @ 06:00) (93% - 100%)  Wt(kg): --    I&O's Summary    12 Nov 2017 07:01  -  13 Nov 2017 06:17  --------------------------------------------------------  IN: 648 mL / OUT: 1155 mL / NET: -507 mL    Appearance: Normal	  HEENT:   Normal oral mucosa, PERRL, EOMI	  Lymphatic: No lymphadenopathy , +  edema  Cardiovascular: irreg  S1 S2, No JVD, No murmurs , Peripheral pulses palpable 2+ bilaterally  Respiratory: Lungs clear to auscultation, normal effort 	  Gastrointestinal:  Soft, Non-tender, + BS	  Skin: No rashes, No ecchymoses, No cyanosis, warm to touch  Musculoskeletal: Normal range of motion, normal strength  Psychiatry:  Mood & affect appropriate    TELEMETRY: 	 Afib   ECG:  	  RADIOLOGY:   DIAGNOSTIC TESTING:  [ ] Echocardiogram:    [ ]  Catheterization:  [ ] Stress Test:    OTHER: 	        ASSESSMENT/PLAN: 	89y Female hx of Afib, chol, htn, cad, chf, dementia , now resp distress, NSTEMI, chf excab    Cont Bipap   echo pending   asa, statin , BB   A/C with heparin gtt , hold eliquis at present   Abx per ICU  tele stable rate and rhythm  diuresis with IV lasix  check daily weights   GI / DVT prophylaxis.   keep K>4, mag >2.0   trend creatine, could cont daily ACE   pulm fiollow up cont Steroid taper Subjective:  pt seen and examined, no complaints on exam.   pt refused Bipap, pt denies chest pain on exam     ALBUTerol/ipratropium for Nebulization 3 milliLiter(s) Nebulizer every 6 hours  amiodarone    Tablet 200 milliGRAM(s) Oral two times a day  aspirin  chewable 81 milliGRAM(s) Oral daily  atorvastatin 80 milliGRAM(s) Oral at bedtime  aztreonam  IVPB 1000 milliGRAM(s) IV Intermittent every 8 hours  furosemide   Injectable 40 milliGRAM(s) IV Push two times a day  heparin  Infusion. 600 Unit(s)/Hr IV Continuous <Continuous>  heparin  Injectable 4000 Unit(s) IV Push every 6 hours PRN  levothyroxine 50 MICROGram(s) Oral daily  losartan 25 milliGRAM(s) Oral daily  methylPREDNISolone sodium succinate Injectable 40 milliGRAM(s) IV Push every 8 hours  metoprolol     tartrate 12.5 milliGRAM(s) Oral two times a day  vancomycin  IVPB 750 milliGRAM(s) IV Intermittent every 12 hours                            14.4   15.8  )-----------( 292      ( 12 Nov 2017 05:00 )             46.6       Hemoglobin: 14.4 g/dL (11-12 @ 05:00)      11-12    142  |  105  |  27<H>  ----------------------------<  220<H>  4.3   |  31  |  1.23    Ca    9.2      12 Nov 2017 05:00    TPro  7.4  /  Alb  2.9<L>  /  TBili  0.4  /  DBili  x   /  AST  22  /  ALT  28  /  AlkPhos  137<H>  11-12    Creatinine Trend: 1.23<--    COAGS:     CARDIAC MARKERS ( 12 Nov 2017 11:43 )  0.201 ng/mL / x     / 65 U/L / x     / 4.5 ng/mL  CARDIAC MARKERS ( 12 Nov 2017 05:00 )  0.175 ng/mL / x     / x     / x     / x            T(C): 35.9 (11-13-17 @ 06:00), Max: 37.4 (11-12-17 @ 07:38)  HR: 128 (11-13-17 @ 06:00) (61 - 128)  BP: 136/84 (11-13-17 @ 06:00) (110/40 - 152/63)  RR: 23 (11-13-17 @ 06:00) (17 - 39)  SpO2: 96% (11-13-17 @ 06:00) (93% - 100%)  Wt(kg): --    I&O's Summary    12 Nov 2017 07:01  -  13 Nov 2017 06:17  --------------------------------------------------------  IN: 648 mL / OUT: 1155 mL / NET: -507 mL    Appearance: Normal	  HEENT:   Normal oral mucosa, PERRL, EOMI	  Lymphatic: No lymphadenopathy , +  edema  Cardiovascular: irreg  S1 S2, No JVD, No murmurs , Peripheral pulses palpable 2+ bilaterally  Respiratory: Lungs clear to auscultation, normal effort 	  Gastrointestinal:  Soft, Non-tender, + BS	  Skin: No rashes, No ecchymoses, No cyanosis, warm to touch  Musculoskeletal: Normal range of motion, normal strength  Psychiatry:  Mood & affect appropriate    TELEMETRY: 	 Afib   ECG:  	  RADIOLOGY:   DIAGNOSTIC TESTING:  [ ] Echocardiogram:    [ ]  Catheterization:  [ ] Stress Test:    OTHER: 	        ASSESSMENT/PLAN: 	89y Female hx of Afib, chol, htn, cad, chf, dementia , now resp distress, NSTEMI, chf excab    Cont Bipap    asa, statin , BB   A/C with heparin gtt , hold eliquis at present   Abx per ICU  tele stable rate and rhythm  diuresis with IV lasix  check daily weights   GI / DVT prophylaxis.   keep K>4, mag >2.0   trend creatine, could cont daily ACE   pulm fiollow up cont Steroid taper

## 2017-11-13 NOTE — PROGRESS NOTE ADULT - SUBJECTIVE AND OBJECTIVE BOX
INTERVAL HPI/OVERNIGHT EVENTS: ***    PRESSORS: [ ] YES [ ] NO  WHICH:    ANTIBIOTICS:                  DATE STARTED:  ANTIBIOTICS:                  DATE STARTED:  ANTIBIOTICS:                  DATE STARTED:    Antimicrobial:  aztreonam  IVPB 1000 milliGRAM(s) IV Intermittent every 8 hours  vancomycin  IVPB 750 milliGRAM(s) IV Intermittent every 12 hours    Cardiovascular:  amiodarone    Tablet 200 milliGRAM(s) Oral two times a day  furosemide   Injectable 40 milliGRAM(s) IV Push two times a day  losartan 25 milliGRAM(s) Oral daily  metoprolol     tartrate 12.5 milliGRAM(s) Oral two times a day    Pulmonary:  ALBUTerol/ipratropium for Nebulization 3 milliLiter(s) Nebulizer every 6 hours    Hematalogic:  aspirin  chewable 81 milliGRAM(s) Oral daily  heparin  Infusion. 600 Unit(s)/Hr IV Continuous <Continuous>  heparin  Injectable 4000 Unit(s) IV Push every 6 hours PRN    Other:  atorvastatin 80 milliGRAM(s) Oral at bedtime  levothyroxine 50 MICROGram(s) Oral daily  methylPREDNISolone sodium succinate Injectable 40 milliGRAM(s) IV Push every 8 hours    ALBUTerol/ipratropium for Nebulization 3 milliLiter(s) Nebulizer every 6 hours  amiodarone    Tablet 200 milliGRAM(s) Oral two times a day  aspirin  chewable 81 milliGRAM(s) Oral daily  atorvastatin 80 milliGRAM(s) Oral at bedtime  aztreonam  IVPB 1000 milliGRAM(s) IV Intermittent every 8 hours  furosemide   Injectable 40 milliGRAM(s) IV Push two times a day  heparin  Infusion. 600 Unit(s)/Hr IV Continuous <Continuous>  heparin  Injectable 4000 Unit(s) IV Push every 6 hours PRN  levothyroxine 50 MICROGram(s) Oral daily  losartan 25 milliGRAM(s) Oral daily  methylPREDNISolone sodium succinate Injectable 40 milliGRAM(s) IV Push every 8 hours  metoprolol     tartrate 12.5 milliGRAM(s) Oral two times a day  vancomycin  IVPB 750 milliGRAM(s) IV Intermittent every 12 hours    Drug Dosing Weight    Weight (kg): 70.3 (2017 04:29)    CENTRAL LINE: [ ] YES [ ] NO  LOCATION:   DATE INSERTED:  REMOVE: [ ] YES [ ] NO  EXPLAIN:    FLAHERTY: [ ] YES [ ] NO    DATE INSERTED:  REMOVE:  [ ] YES [ ] NO  EXPLAIN:    A-LINE:  [ ] YES [ ] NO  LOCATION:   DATE INSERTED:  REMOVE:  [ ] YES [ ] NO  EXPLAIN:    PMH -reviewed admission note, no change since admission      ICU Vital Signs Last 24 Hrs  T(C): 35.9 (2017 06:00), Max: 36.6 (2017 14:00)  T(F): 96.6 (2017 06:00), Max: 97.8 (2017 14:00)  HR: 120 (2017 08:15) (61 - 132)  BP: 115/67 (2017 08:00) (110/40 - 152/63)  BP(mean): 76 (2017 08:00) (56 - 95)  ABP: --  ABP(mean): --  RR: 22 (2017 08:00) (17 - 39)  SpO2: 96% (2017 08:15) (92% - 100%)      ABG - ( 2017 05:15 )  pH: 7.35  /  pCO2: 48    /  pO2: 132   / HCO3: 26    / Base Excess: 0.5   /  SaO2: 98                     @ 07:01  -   @ 07:00  --------------------------------------------------------  IN: 654 mL / OUT: 1215 mL / NET: -561 mL            PHYSICAL EXAM:    GENERAL: [x ]NAD, [x ]well-groomed, [ x]well-developed  HEAD:  [x ]Atraumatic, [x ]Normocephalic  EYES: [ x]EOMI, [x ]PERRLA, [ x]conjunctiva and sclera clear  ENMT: [x ]No tonsillar erythema, exudates, or enlargement; [x ]Moist mucous membranes, [ ]Good dentition, [ ]No lesions  NECK: [x ]Supple, normal appearance,   NERVOUS SYSTEM:  [x ]Alert & Oriented X3; [ x]Motor Strength 5/5 B/L upper and lower extremities; [ x]DTRs 2+ intact and symmetric  CHEST/LUNG: [x ]No chest deformity; [x ]Normal percussion bilaterally; [ x]No rales, rhonchi, wheezing   HEART: [ x]Regular rate and rhythm; [x ]No murmurs, rubs, or gallops  ABDOMEN: [x ]Soft, Nontender, Nondistended; [x ]Bowel sounds present  EXTREMITIES:  [x ]2+ Peripheral Pulses, [x ]No clubbing, cyanosis, or edema  LYMPH: [ x]No lymphadenopathy noted  SKIN: [x ]No rashes or lesions; [ x]Good capillary refill      LABS:  CBC Full  -  ( 2017 05:00 )  WBC Count : 15.8 K/uL  Hemoglobin : 14.4 g/dL  Hematocrit : 46.6 %  Platelet Count - Automated : 292 K/uL  Mean Cell Volume : 98.9 fl  Mean Cell Hemoglobin : 30.5 pg  Mean Cell Hemoglobin Concentration : 30.8 gm/dL  Auto Neutrophil # : 13.5 K/uL  Auto Lymphocyte # : 1.1 K/uL  Auto Monocyte # : 0.8 K/uL  Auto Eosinophil # : 0.3 K/uL  Auto Basophil # : 0.2 K/uL  Auto Neutrophil % : 85.7 %  Auto Lymphocyte % : 6.9 %  Auto Monocyte % : 4.9 %  Auto Eosinophil % : 1.6 %  Auto Basophil % : 1.0 %        142  |  105  |  27<H>  ----------------------------<  220<H>  4.3   |  31  |  1.23    Ca    9.2      2017 05:00    TPro  7.4  /  Alb  2.9<L>  /  TBili  0.4  /  DBili  x   /  AST  22  /  ALT  28  /  AlkPhos  137<H>  11-12    PT/INR - ( 2017 05:00 )   PT: 14.7 sec;   INR: 1.34 ratio         PTT - ( 2017 05:00 )  PTT:30.7 sec  Urinalysis Basic - ( 2017 08:14 )    Color: Yellow / Appearance: Clear / S.010 / pH: x  Gluc: x / Ketone: Negative  / Bili: Negative / Urobili: Negative   Blood: x / Protein: Negative / Nitrite: Negative   Leuk Esterase: Negative / RBC: 0-2 /HPF / WBC 0-2 /HPF   Sq Epi: x / Non Sq Epi: Occasional /HPF / Bacteria: Many /HPF          RADIOLOGY & ADDITIONAL STUDIES REVIEWED:  ***    [ ]GOALS OF CARE DISCUSSION WITH PATIENT/FAMILY/PROXY:    CRITICAL CARE TIME SPENT: 35 minutes INTERVAL HPI/OVERNIGHT EVENTS: profound consfusion    PRESSORS: [ ] YES [ x] NO  WHICH:    Antimicrobial:  aztreonam  IVPB 1000 milliGRAM(s) IV Intermittent every 8 hours        vancomycin  IVPB 750 milliGRAM(s) IV Intermittent every 12 hours         Cardiovascular:  amiodarone    Tablet 200 milliGRAM(s) Oral two times a day  furosemide   Injectable 40 milliGRAM(s) IV Push two times a day  losartan 25 milliGRAM(s) Oral daily  metoprolol     tartrate 12.5 milliGRAM(s) Oral two times a day    Pulmonary:  ALBUTerol/ipratropium for Nebulization 3 milliLiter(s) Nebulizer every 6 hours    Hematalogic:  aspirin  chewable 81 milliGRAM(s) Oral daily  heparin  Infusion. 600 Unit(s)/Hr IV Continuous <Continuous>  heparin  Injectable 4000 Unit(s) IV Push every 6 hours PRN    Other:  atorvastatin 80 milliGRAM(s) Oral at bedtime  levothyroxine 50 MICROGram(s) Oral daily  methylPREDNISolone sodium succinate Injectable 40 milliGRAM(s) IV Push every 8 hours    ALBUTerol/ipratropium for Nebulization 3 milliLiter(s) Nebulizer every 6 hours  amiodarone    Tablet 200 milliGRAM(s) Oral two times a day  aspirin  chewable 81 milliGRAM(s) Oral daily  atorvastatin 80 milliGRAM(s) Oral at bedtime  aztreonam  IVPB 1000 milliGRAM(s) IV Intermittent every 8 hours  furosemide   Injectable 40 milliGRAM(s) IV Push two times a day  heparin  Infusion. 600 Unit(s)/Hr IV Continuous <Continuous>  heparin  Injectable 4000 Unit(s) IV Push every 6 hours PRN  levothyroxine 50 MICROGram(s) Oral daily  losartan 25 milliGRAM(s) Oral daily  methylPREDNISolone sodium succinate Injectable 40 milliGRAM(s) IV Push every 8 hours  metoprolol     tartrate 12.5 milliGRAM(s) Oral two times a day  vancomycin  IVPB 750 milliGRAM(s) IV Intermittent every 12 hours    Drug Dosing Weight    Weight (kg): 70.3 (2017 04:29)    CENTRAL LINE: [ ] YES [x ] NO  LOCATION:   DATE INSERTED:  REMOVE: [ ] YES [ ] NO  EXPLAIN:    FLAHERTY: [x ] YES [ ] NO    DATE INSERTED:   REMOVE:  [ ] YES [ ] NO  EXPLAIN:    A-LINE:  [ ] YES [x ] NO  LOCATION:   DATE INSERTED:  REMOVE:  [ ] YES [ ] NO  EXPLAIN:    PMH -reviewed admission note, no change since admission      ICU Vital Signs Last 24 Hrs  T(C): 35.9 (2017 06:00), Max: 36.6 (2017 14:00)  T(F): 96.6 (2017 06:00), Max: 97.8 (2017 14:00)  HR: 120 (2017 08:15) (61 - 132)  BP: 115/67 (2017 08:00) (110/40 - 152/63)  BP(mean): 76 (2017 08:00) (56 - 95)  ABP: --  ABP(mean): --  RR: 22 (2017 08:00) (17 - 39)  SpO2: 96% (2017 08:15) (92% - 100%)      ABG - ( 2017 05:15 )  pH: 7.35  /  pCO2: 48    /  pO2: 132   / HCO3: 26    / Base Excess: 0.5   /  SaO2: 98                     @ 07:01  -   @ 07:00  --------------------------------------------------------  IN: 654 mL / OUT: 1215 mL / NET: -561 mL            PHYSICAL EXAM:    GENERAL: [x ]NAD, [x ]well-groomed, [ x]well-developed  HEAD:  [x ]Atraumatic, [x ]Normocephalic  EYES: [ x]EOMI, [x ]PERRLA, [ x]conjunctiva and sclera clear  ENMT: [x ]No tonsillar erythema, exudates, or enlargement; [x ]Moist mucous membranes, [ ]Good dentition, [ ]No lesions  NECK: [x ]Supple, normal appearance,   NERVOUS SYSTEM:  confused ; AAO*0 ; [ x]Motor Strength 5/5 B/L upper and lower extremities; [ x]DTRs 2+ intact and symmetric  CHEST/LUNG: [x ]No chest deformity; [x ]Normal percussion bilaterally; [ x]No rales, rhonchi, wheezing   HEART: [ x]Regular rate and rhythm; [x ]No murmurs, rubs, or gallops  ABDOMEN: [x ]Soft, Nontender, Nondistended; [x ]Bowel sounds present  EXTREMITIES:  [x ]2+ Peripheral Pulses, [x ]No clubbing, cyanosis, or edema  LYMPH: [ x]No lymphadenopathy noted  SKIN: [x ]No rashes or lesions; [ x]Good capillary refill      LABS:  CBC Full  -  ( 2017 05:00 )  WBC Count : 15.8 K/uL  Hemoglobin : 14.4 g/dL  Hematocrit : 46.6 %  Platelet Count - Automated : 292 K/uL  Mean Cell Volume : 98.9 fl  Mean Cell Hemoglobin : 30.5 pg  Mean Cell Hemoglobin Concentration : 30.8 gm/dL  Auto Neutrophil # : 13.5 K/uL  Auto Lymphocyte # : 1.1 K/uL  Auto Monocyte # : 0.8 K/uL  Auto Eosinophil # : 0.3 K/uL  Auto Basophil # : 0.2 K/uL  Auto Neutrophil % : 85.7 %  Auto Lymphocyte % : 6.9 %  Auto Monocyte % : 4.9 %  Auto Eosinophil % : 1.6 %  Auto Basophil % : 1.0 %        142  |  105  |  27<H>  ----------------------------<  220<H>  4.3   |  31  |  1.23    Ca    9.2      2017 05:00    TPro  7.4  /  Alb  2.9<L>  /  TBili  0.4  /  DBili  x   /  AST  22  /  ALT  28  /  AlkPhos  137<H>  1112    PT/INR - ( 2017 05:00 )   PT: 14.7 sec;   INR: 1.34 ratio         PTT - ( 2017 05:00 )  PTT:30.7 sec  Urinalysis Basic - ( 2017 08:14 )    Color: Yellow / Appearance: Clear / S.010 / pH: x  Gluc: x / Ketone: Negative  / Bili: Negative / Urobili: Negative   Blood: x / Protein: Negative / Nitrite: Negative   Leuk Esterase: Negative / RBC: 0-2 /HPF / WBC 0-2 /HPF   Sq Epi: x / Non Sq Epi: Occasional /HPF / Bacteria: Many /HPF          RADIOLOGY & ADDITIONAL STUDIES REVIEWED:  ***    [ ]GOALS OF CARE DISCUSSION WITH PATIENT/FAMILY/PROXY:    CRITICAL CARE TIME SPENT: 35 minutes

## 2017-11-13 NOTE — PROGRESS NOTE ADULT - ASSESSMENT
89 yr old male, from NH, H/O A FIB/CHF/COPD/HLD/CAD/dementia, admit hospital for acute respirotary distress/resp failure/shock, admit ICU, S/P BIPAP, IV ABS, elevated BNP/troponin, on heparin drip,cardiology F/U, IV ABS, ICU care, monitor labs.

## 2017-11-14 LAB
ANION GAP SERPL CALC-SCNC: 7 MMOL/L — SIGNIFICANT CHANGE UP (ref 5–17)
BUN SERPL-MCNC: 57 MG/DL — HIGH (ref 7–18)
CALCIUM SERPL-MCNC: 8.7 MG/DL — SIGNIFICANT CHANGE UP (ref 8.4–10.5)
CHLORIDE SERPL-SCNC: 104 MMOL/L — SIGNIFICANT CHANGE UP (ref 96–108)
CK MB BLD-MCNC: 12.9 % — HIGH (ref 0–3.5)
CK MB BLD-MCNC: 9.6 % — HIGH (ref 0–3.5)
CK MB CFR SERPL CALC: 12.1 NG/ML — HIGH (ref 0–3.6)
CK MB CFR SERPL CALC: 7.5 NG/ML — HIGH (ref 0–3.6)
CK SERPL-CCNC: 78 U/L — SIGNIFICANT CHANGE UP (ref 21–215)
CK SERPL-CCNC: 94 U/L — SIGNIFICANT CHANGE UP (ref 21–215)
CO2 SERPL-SCNC: 27 MMOL/L — SIGNIFICANT CHANGE UP (ref 22–31)
CREAT SERPL-MCNC: 1.61 MG/DL — HIGH (ref 0.5–1.3)
DIGOXIN SERPL-MCNC: 4.8 NG/ML — CRITICAL HIGH (ref 0.8–2)
GLUCOSE SERPL-MCNC: 150 MG/DL — HIGH (ref 70–99)
HCT VFR BLD CALC: 33.9 % — LOW (ref 34.5–45)
HCT VFR BLD CALC: 36 % — SIGNIFICANT CHANGE UP (ref 34.5–45)
HGB BLD-MCNC: 11.1 G/DL — LOW (ref 11.5–15.5)
HGB BLD-MCNC: 11.6 G/DL — SIGNIFICANT CHANGE UP (ref 11.5–15.5)
MAGNESIUM SERPL-MCNC: 2.2 MG/DL — SIGNIFICANT CHANGE UP (ref 1.6–2.6)
MCHC RBC-ENTMCNC: 31 PG — SIGNIFICANT CHANGE UP (ref 27–34)
MCHC RBC-ENTMCNC: 31.7 PG — SIGNIFICANT CHANGE UP (ref 27–34)
MCHC RBC-ENTMCNC: 32.4 GM/DL — SIGNIFICANT CHANGE UP (ref 32–36)
MCHC RBC-ENTMCNC: 32.8 GM/DL — SIGNIFICANT CHANGE UP (ref 32–36)
MCV RBC AUTO: 95.7 FL — SIGNIFICANT CHANGE UP (ref 80–100)
MCV RBC AUTO: 96.8 FL — SIGNIFICANT CHANGE UP (ref 80–100)
PHOSPHATE SERPL-MCNC: 3.6 MG/DL — SIGNIFICANT CHANGE UP (ref 2.5–4.5)
PLATELET # BLD AUTO: 238 K/UL — SIGNIFICANT CHANGE UP (ref 150–400)
PLATELET # BLD AUTO: 244 K/UL — SIGNIFICANT CHANGE UP (ref 150–400)
POTASSIUM SERPL-MCNC: 3.6 MMOL/L — SIGNIFICANT CHANGE UP (ref 3.5–5.3)
POTASSIUM SERPL-SCNC: 3.6 MMOL/L — SIGNIFICANT CHANGE UP (ref 3.5–5.3)
RBC # BLD: 3.5 M/UL — LOW (ref 3.8–5.2)
RBC # BLD: 3.76 M/UL — LOW (ref 3.8–5.2)
RBC # FLD: 12.2 % — SIGNIFICANT CHANGE UP (ref 10.3–14.5)
RBC # FLD: 12.4 % — SIGNIFICANT CHANGE UP (ref 10.3–14.5)
SODIUM SERPL-SCNC: 138 MMOL/L — SIGNIFICANT CHANGE UP (ref 135–145)
TROPONIN I SERPL-MCNC: 0.82 NG/ML — HIGH (ref 0–0.04)
TROPONIN I SERPL-MCNC: 1.81 NG/ML — HIGH (ref 0–0.04)
TSH SERPL-MCNC: 0.99 UU/ML — SIGNIFICANT CHANGE UP (ref 0.34–4.82)
VANCOMYCIN TROUGH SERPL-MCNC: 22.7 UG/ML — HIGH (ref 10–20)
WBC # BLD: 13.7 K/UL — HIGH (ref 3.8–10.5)
WBC # BLD: 14 K/UL — HIGH (ref 3.8–10.5)
WBC # FLD AUTO: 13.7 K/UL — HIGH (ref 3.8–10.5)
WBC # FLD AUTO: 14 K/UL — HIGH (ref 3.8–10.5)

## 2017-11-14 PROCEDURE — 71010: CPT | Mod: 26

## 2017-11-14 RX ORDER — MORPHINE SULFATE 50 MG/1
1 CAPSULE, EXTENDED RELEASE ORAL ONCE
Qty: 0 | Refills: 0 | Status: DISCONTINUED | OUTPATIENT
Start: 2017-11-14 | End: 2017-11-14

## 2017-11-14 RX ORDER — DIGOXIN 250 MCG
0.25 TABLET ORAL DAILY
Qty: 0 | Refills: 0 | Status: DISCONTINUED | OUTPATIENT
Start: 2017-11-14 | End: 2017-11-15

## 2017-11-14 RX ORDER — LANOLIN ALCOHOL/MO/W.PET/CERES
3 CREAM (GRAM) TOPICAL AT BEDTIME
Qty: 0 | Refills: 0 | Status: COMPLETED | OUTPATIENT
Start: 2017-11-14 | End: 2017-11-14

## 2017-11-14 RX ORDER — FUROSEMIDE 40 MG
40 TABLET ORAL EVERY 8 HOURS
Qty: 0 | Refills: 0 | Status: DISCONTINUED | OUTPATIENT
Start: 2017-11-14 | End: 2017-11-15

## 2017-11-14 RX ORDER — DIGOXIN 250 MCG
0.25 TABLET ORAL DAILY
Qty: 0 | Refills: 0 | Status: DISCONTINUED | OUTPATIENT
Start: 2017-11-14 | End: 2017-11-14

## 2017-11-14 RX ORDER — DIGOXIN 250 MCG
0.25 TABLET ORAL EVERY 6 HOURS
Qty: 0 | Refills: 0 | Status: DISCONTINUED | OUTPATIENT
Start: 2017-11-14 | End: 2017-11-14

## 2017-11-14 RX ORDER — DIGOXIN 250 MCG
0.25 TABLET ORAL EVERY 6 HOURS
Qty: 0 | Refills: 0 | Status: COMPLETED | OUTPATIENT
Start: 2017-11-14 | End: 2017-11-14

## 2017-11-14 RX ORDER — ALBUMIN HUMAN 25 %
100 VIAL (ML) INTRAVENOUS EVERY 6 HOURS
Qty: 0 | Refills: 0 | Status: DISCONTINUED | OUTPATIENT
Start: 2017-11-14 | End: 2017-11-14

## 2017-11-14 RX ORDER — METOPROLOL TARTRATE 50 MG
12.5 TABLET ORAL
Qty: 0 | Refills: 0 | Status: DISCONTINUED | OUTPATIENT
Start: 2017-11-14 | End: 2017-11-22

## 2017-11-14 RX ADMIN — Medication 3 MILLIGRAM(S): at 23:43

## 2017-11-14 RX ADMIN — Medication 50 MILLIGRAM(S): at 06:05

## 2017-11-14 RX ADMIN — LOSARTAN POTASSIUM 25 MILLIGRAM(S): 100 TABLET, FILM COATED ORAL at 06:04

## 2017-11-14 RX ADMIN — Medication 0.25 MILLIGRAM(S): at 11:20

## 2017-11-14 RX ADMIN — APIXABAN 2.5 MILLIGRAM(S): 2.5 TABLET, FILM COATED ORAL at 18:30

## 2017-11-14 RX ADMIN — ATORVASTATIN CALCIUM 80 MILLIGRAM(S): 80 TABLET, FILM COATED ORAL at 23:44

## 2017-11-14 RX ADMIN — Medication 12.5 MILLIGRAM(S): at 18:30

## 2017-11-14 RX ADMIN — Medication 0.25 MILLIGRAM(S): at 04:09

## 2017-11-14 RX ADMIN — Medication 40 MILLIGRAM(S): at 23:44

## 2017-11-14 RX ADMIN — Medication 40 MILLIGRAM(S): at 10:45

## 2017-11-14 RX ADMIN — MORPHINE SULFATE 1 MILLIGRAM(S): 50 CAPSULE, EXTENDED RELEASE ORAL at 00:54

## 2017-11-14 RX ADMIN — Medication 20 MILLIGRAM(S): at 23:43

## 2017-11-14 RX ADMIN — Medication 40 MILLIGRAM(S): at 06:04

## 2017-11-14 RX ADMIN — Medication 20 MILLIGRAM(S): at 13:32

## 2017-11-14 RX ADMIN — Medication 50 MICROGRAM(S): at 06:04

## 2017-11-14 RX ADMIN — Medication 25 MILLIGRAM(S): at 06:04

## 2017-11-14 RX ADMIN — PANTOPRAZOLE SODIUM 40 MILLIGRAM(S): 20 TABLET, DELAYED RELEASE ORAL at 06:04

## 2017-11-14 RX ADMIN — Medication 0.25 MILLIGRAM(S): at 18:30

## 2017-11-14 RX ADMIN — APIXABAN 2.5 MILLIGRAM(S): 2.5 TABLET, FILM COATED ORAL at 06:04

## 2017-11-14 RX ADMIN — Medication 81 MILLIGRAM(S): at 11:20

## 2017-11-14 RX ADMIN — Medication 50 MILLILITER(S): at 11:46

## 2017-11-14 RX ADMIN — Medication 0.5 MILLIGRAM(S): at 00:05

## 2017-11-14 NOTE — PHYSICAL THERAPY INITIAL EVALUATION ADULT - IMPAIRMENTS FOUND, PT EVAL
posture/aerobic capacity/endurance/gait, locomotion, and balance/muscle strength/ventilation and respiration/gas exchange

## 2017-11-14 NOTE — PROGRESS NOTE ADULT - ASSESSMENT
89 yr old male, from NH, H/O A FIB/CHF/COPD/HLD/CAD/dementia, admit hospital for acute respirotary distress/resp failure/shock, admit ICU, S/P BIPAP, IV ABS, elevated BNP/troponin, on heparin drip,cardiology F/U, IV ABS, ICU care, monitor labs. elevated BUN/Cr, IV hydration, monitor labs.

## 2017-11-14 NOTE — PHYSICAL THERAPY INITIAL EVALUATION ADULT - LIVES WITH, PROFILE
in an apartment with stairs and elevator access; recently came from [facility] where she was receiving skilled rehabilitation services/alone

## 2017-11-14 NOTE — CONSULT NOTE ADULT - ASSESSMENT
89 YR OLD FEMALE WITH MOE IN SETTING OF CHF EXERCEBATION AND NSTEMI.  MOE FROM CARDIORENAL SYNDROME.  LEUCOCYTOSIS WITH COPD EXERCEBATION ON SOLUMEDROL    REC: CONT CURRENT DIURETIC DOSE.   HOLD LOSARTNA UNTIL SCR BACK TO BASELINE.
bilat pneumonia vs chf  leukocytosis    plan - decrease vanco to 750mgs iv q12hrs  cont azactam 1 gm iv q8hrs

## 2017-11-14 NOTE — PROGRESS NOTE ADULT - ATTENDING COMMENTS
Patient seen and examined, agree with above assessment and plan as transcribed above.    - Cont ABx for PNA/bronchits per ICU  - Keep O>I  - medical management of presumed CAD, cont asa, statin BB    Darin Dailey MD, [facility]  [facility], [facility]  [address] [place] [address]  PHONE:  [phone]  BEEPER : [phone]

## 2017-11-14 NOTE — PHYSICAL THERAPY INITIAL EVALUATION ADULT - ADDITIONAL COMMENTS
no assistive device used for household ambulation; utilizes a shopping cart for community ambulation

## 2017-11-14 NOTE — CONSULT NOTE ADULT - SUBJECTIVE AND OBJECTIVE BOX
89 YR OLD FEMALE WITH DEMENTIA, AFIB, CHF. BROUGHT TO ED BY AMBULANCE WITH SOB AND HYPOXIA. ON EVALUATION  WAS HYPOXIC AND PLACED ON CPAP. ALSO STARTED ON LASIX WITH BRISK DIURESIS AND FEELS BETTER. SCR NOTED TO BE RISING FROM 1.1 AT PRESENTATION TO 1.6 PROMPTING A RENAL CONSULT. HAS INCREASED TROPONINS AS WELL.    PAST MEDICAL & SURGICAL HISTORY:  Hypothyroidism  HLD (hyperlipidemia)  Osteoporosis  Dementia  HTN (hypertension)  Heart failure  NSTEMI (non-ST elevated myocardial infarction)  Atrial fibrillation  COPD (chronic obstructive pulmonary disease)  No significant past surgical history    penicillin (Unknown)    Home Medications Reviewed  Hospital Medications:   MEDICATIONS  (STANDING):  apixaban 2.5 milliGRAM(s) Oral every 12 hours  aspirin  chewable 81 milliGRAM(s) Oral daily  atorvastatin 80 milliGRAM(s) Oral at bedtime  digoxin     Tablet 0.25 milliGRAM(s) Oral daily  furosemide   Injectable 40 milliGRAM(s) IV Push every 8 hours  levothyroxine 50 MICROGram(s) Oral daily  losartan 25 milliGRAM(s) Oral daily  methylPREDNISolone sodium succinate Injectable 20 milliGRAM(s) IV Push every 8 hours  metoprolol     tartrate 12.5 milliGRAM(s) Oral two times a day  pantoprazole    Tablet 40 milliGRAM(s) Oral before breakfast    SOCIAL HISTORY:  Denies ETOh,Smoking,   FAMILY HISTORY:  No pertinent family history in first degree relatives        VITALS:  T(F): 97.6 (17 @ 17:11), Max: 98 (17 @ 19:54)  HR: 60 (17 @ 17:11)  BP: 126/51 (17 @ 17:11)  RR: 16 (17 @ 17:11)  SpO2: 97% (17 @ 17:11)  Wt(kg): --     @ 07:01  -   @ 07:00  --------------------------------------------------------  IN: 938 mL / OUT: 475 mL / NET: 463 mL     @ 07:01  -   @ 18:10  --------------------------------------------------------  IN: 300 mL / OUT: 100 mL / NET: 200 mL        PHYSICAL EXAM:  Constitutional: NAD  HEENT: anicteric sclera, oropharynx clear, MMM  Neck: No JVD  Respiratory: Bilat  wheezes, rales or rhonchi  Cardiovascular: S1, S2, RRR  Gastrointestinal: BS+, soft, NT/ND  Extremities: peripheral edema+  Neurological: A/O x 3, no focal deficits      LABS:      138  |  104  |  57<H>  ----------------------------<  150<H>  3.6   |  27  |  1.61<H>    Ca    8.7      2017 01:25  Phos  3.6     11-14  Mg     2.2     11-14      Creatinine Trend: 1.61 <--, 1.16 <--, 1.23 <--                        11.1   13.7  )-----------( 238      ( 2017 05:58 )             33.9     Urine Studies:  Urinalysis Basic - ( 2017 08:14 )    Color: Yellow / Appearance: Clear / S.010 / pH:   Gluc:  / Ketone: Negative  / Bili: Negative / Urobili: Negative   Blood:  / Protein: Negative / Nitrite: Negative   Leuk Esterase: Negative / RBC: 0-2 /HPF / WBC 0-2 /HPF   Sq Epi:  / Non Sq Epi: Occasional /HPF / Bacteria: Many /HPF        RADIOLOGY & ADDITIONAL STUDIES:

## 2017-11-14 NOTE — PROGRESS NOTE ADULT - ATTENDING COMMENTS
89 y/o F pt from [facility] with PMHx of AFib, Dementia, Heart Failure, HLD, HTN, Hypothyroidism, NSTEMI, Osteoporosis, and COPD and no significant PSHx BIB EMS from nursing home to ED with difficulty breathing noted today. Per EMS, pt with O2Sat of 52% on room air; pt was placed on a CPAP en route to ED. EMS reports no medications were given to pt during the transport to ED. Pt is DNR/DNI on MOLST, per long-term paperwork and confirmed with pt and his son. Pt unable to provide detailed Hx and HPI due to condition (respiratory distress). PMD: Dr. Topete.  Pt denies fever, chest pain, Abd pain, nausea, vomiting, or any other complaints. Pt is allergic to Penicillin.  At NH RR 30, SpO2 55-85% on RA, T 97 F, HR 85, /90.  In ED pt RR 28, SpO2 91%, saturating 98% on 40% b 98%. Pt is Alert and orientedx2.  Pt is being admitted to ICU for Acute hypoxic Hypercapneic resp failure sec to Acute on Chronic CHF exacerbation and COPD exacerbation likely sec to HCAP/ Acute Bronchitis given leukocytosis requiring new Bipap.       Problem/Plan - 1:  ·  Problem: Acute respiratory failure with hypoxia and hypercapnia.  Plan: Pt has SOB and b/l 1+ pitting edema, with hypoxia elevated pro BNP 12,000 and mildly congested CXR with due to Acute hypoxic Hypercapneic resp failure sec to Acute on Chronic CHF exacerbation and COPD exacerbation likely sec to HCAP/ Acute Bronchitis given leukocytosis requiring new Bipap.  Admitted to ICU for new bipap  Patient has not used BIPAP since admission ; saturating well on room air   Input and output monitoring, daily weights  c/w solumedrol 20 mg q8 and duonebs for copd exacerbation  Patient has no fever , no leucocytosis : no sings of infection : antibiotics discontinued   continue lopressor, losartan, ASA, Lipitor for heart failure   f/u ECHO : EF of 55-60 % with grade 2 dd and moderate LVH  Cardio Dr. Dailey consulted.      Problem/Plan - 2:  ·  Problem: NSTEMI (non-ST elevated myocardial infarction).  Plan:  Ruled out  Patient had mildly elevated troponins   cardiac enzymes were re-trended as patient had uncontrolled Atrial Fibrillation overnight ; however ekg is unchanged from before : hence will stop trending now   However has a normal ECHO  Must be most likely secondary to demand ischemia   Cardio Dr. Dailey.      Problem/Plan - 3:  ·  Problem: Atrial fibrillation, unspecified type.  Plan: Pt has Afib, rate controlled on Lopressor   Amiodarone will be discontinued with concern for pulmonary toxicity   continue with lopressor for rate control : continue with 12.5 bid   Digoxin loading was done as patient had rate uncontrolled atrial fibrillation overnight   restarted at home dose of elliquis 2.5 bid for anticoagulation     Problem/Plan - 4:  Problem : Acute kidney injury   Patient has a baseline normal creatinine   However creat today is 1.61  New developement   Patient will be given one extra dose of lasix for adequate diuresis   Albumin for a day to move fluids intracellularly and adequate diuresis  Dr. Bradford was consulted for the same  Nephrotoxic drugs like vancomycin discontinued

## 2017-11-14 NOTE — CHART NOTE - NSCHARTNOTEFT_GEN_A_CORE
89 y/o F pt from [facility] with PMHx of AFib, Dementia, Heart Failure, HLD, HTN, Hypothyroidism, NSTEMI, Osteoporosis, and COPD and no significant PSHx BIB EMS from nursing home to ED with difficulty breathing noted today. Per EMS, pt with O2Sat of 52% on room air; pt was placed on a CPAP en route to ED. EMS reports no medications were given to pt during the transport to ED. Pt is DNR/DNI on MOLST, per prison paperwork and confirmed with pt and his son. Pt unable to provide detailed Hx and HPI due to condition (respiratory distress). PMD: Dr. Topete.  Pt denies fever, chest pain, Abd pain, nausea, vomiting, or any other complaints. Pt is allergic to Penicillin.  At NH RR 30, SpO2 55-85% on RA, T 97 F, HR 85, /90.  In ED pt RR 28, SpO2 91%, saturating 98% on 40% b 98%. Pt is Alert and orientedx2.  Pt is being admitted to ICU for Acute hypoxic Hypercapneic resp failure sec to Acute on Chronic CHF exacerbation and COPD exacerbation likely sec to HCAP/ Acute Bronchitis given leukocytosis requiring new Bipap.        Problem/Plan - 1:  ·  Problem: Acute respiratory failure with hypoxia and hypercapnia.  Plan: Pt has SOB and b/l 1+ pitting edema, with hypoxia elevated pro BNP 12,000 and mildly congested CXR with due to Acute hypoxic Hypercapneic resp failure sec to Acute on Chronic CHF exacerbation and COPD exacerbation likely sec to HCAP/ Acute Bronchitis given leukocytosis requiring new Bipap.  Admitted to ICU for new bipap  Patient has not used BIPAP since admission ; saturating well on room air   Input and output monitoring, daily weights  c/w solumedrol 20 mg q8 and duonebs for copd exacerbation  Patient has no fever , no leucocytosis : no sings of infection : antibiotics discontinued   continue lopressor, losartan, ASA, Lipitor for heart failure   f/u ECHO : EF of 55-60 % with grade 2 dd and moderate LVH  Cardio Dr. Dailey consulted.      Problem/Plan - 2:  ·  Problem: NSTEMI (non-ST elevated myocardial infarction).  Plan:  Ruled out  Patient had mildly elevated troponins   cardiac enzymes were re-trended as patient had uncontrolled Atrial Fibrillation overnight ; however ekg is unchanged from before : hence will stop trending now   However has a normal ECHO  Must be most likely secondary to demand ischemia   Cardio Dr. Dailey.      Problem/Plan - 3:  ·  Problem: Atrial fibrillation, unspecified type.  Plan: Pt has Afib, rate controlled on Lopressor   Amiodarone will be discontinued with concern for pulmonary toxicity   continue with lopressor for rate control : continue with 12.5 bid   Digoxin loading was done as patient had rate uncontrolled atrial fibrillation overnight   restarted at home dose of elliquis 2.5 bid for anticoagulation     Problem/Plan - 4:  Problem : Acute kidney injury   Patient has a baseline normal creatinine   However creat today is 1.61  New developement   Patient will be given one extra dose of lasix for adequate diuresis   Albumin for a day to move fluids intracellularly and adequate diuresis  Dr. Bradford was consulted for the same  Nephrotoxic drugs like vancomycin discontinued       Problem/Plan - 5:  ·  Problem: Prophylactic measure.  Plan: IMPROVE score 2, on elliquis , scd boots for dvt px.   Protonix for gi ppx    Patient is medically stable to be downgraded to medicine floor , advanced illness .    Things to follow up :   1. Monitor heart rate , follow up with cardiologist on case , Dr Mariano Dailey.   2. Monitor kidney function   3 . Taper steroids as tolerated

## 2017-11-14 NOTE — PROGRESS NOTE ADULT - SUBJECTIVE AND OBJECTIVE BOX
Patient denies CP, Had some SOB las night     albumin human 25% IVPB 100 milliLiter(s) IV Intermittent every 6 hours  apixaban 2.5 milliGRAM(s) Oral every 12 hours  aspirin  chewable 81 milliGRAM(s) Oral daily  atorvastatin 80 milliGRAM(s) Oral at bedtime  digoxin     Tablet 0.25 milliGRAM(s) Oral daily  digoxin  Injectable 0.25 milliGRAM(s) IV Push every 6 hours  furosemide   Injectable 40 milliGRAM(s) IV Push every 8 hours  levothyroxine 50 MICROGram(s) Oral daily  losartan 25 milliGRAM(s) Oral daily  methylPREDNISolone sodium succinate Injectable 20 milliGRAM(s) IV Push every 8 hours  metoprolol     tartrate 12.5 milliGRAM(s) Oral two times a day  pantoprazole    Tablet 40 milliGRAM(s) Oral before breakfast                            11.1   13.7  )-----------( 238      ( 14 Nov 2017 05:58 )             33.9       Hemoglobin: 11.1 g/dL (11-14 @ 05:58)  Hemoglobin: 11.6 g/dL (11-14 @ 01:25)  Hemoglobin: 13.6 g/dL (11-13 @ 06:18)  Hemoglobin: 12.7 g/dL (11-12 @ 13:09)  Hemoglobin: 14.4 g/dL (11-12 @ 05:00)      11-14    138  |  104  |  57<H>  ----------------------------<  150<H>  3.6   |  27  |  1.61<H>    Ca    8.7      14 Nov 2017 01:25  Phos  3.6     11-14  Mg     2.2     11-14      Creatinine Trend: 1.61<--, 1.16<--, 1.23<--    COAGS:     CARDIAC MARKERS ( 14 Nov 2017 08:38 )  1.810 ng/mL / x     / 94 U/L / x     / 12.1 ng/mL  CARDIAC MARKERS ( 14 Nov 2017 01:25 )  0.820 ng/mL / x     / 78 U/L / x     / 7.5 ng/mL  CARDIAC MARKERS ( 12 Nov 2017 20:53 )  0.134 ng/mL / x     / 66 U/L / x     / 4.3 ng/mL  CARDIAC MARKERS ( 12 Nov 2017 17:16 )  0.137 ng/mL / x     / 63 U/L / x     / 4.3 ng/mL  CARDIAC MARKERS ( 12 Nov 2017 11:43 )  0.201 ng/mL / x     / 65 U/L / x     / 4.5 ng/mL  CARDIAC MARKERS ( 12 Nov 2017 05:00 )  0.175 ng/mL / x     / x     / x     / x            T(C): 35 (11-14-17 @ 09:00), Max: 36.8 (11-13-17 @ 12:00)  HR: 71 (11-14-17 @ 10:00) (68 - 141)  BP: 103/53 (11-14-17 @ 10:00) (89/51 - 146/84)  RR: 16 (11-14-17 @ 10:00) (13 - 27)  SpO2: 98% (11-14-17 @ 10:00) (91% - 100%)  Wt(kg): --    I&O's Summary    13 Nov 2017 07:01  -  14 Nov 2017 07:00  --------------------------------------------------------  IN: 938 mL / OUT: 475 mL / NET: 463 mL    14 Nov 2017 07:01  -  14 Nov 2017 11:00  --------------------------------------------------------  IN: 150 mL / OUT: 100 mL / NET: 50 mL      Appearance: Normal	  HEENT:   Normal oral mucosa, PERRL, EOMI	  Lymphatic: No lymphadenopathy , +  edema  Cardiovascular: irreg  S1 S2, No JVD, No murmurs , Peripheral pulses palpable 2+ bilaterally  Respiratory: Lungs clear to auscultation, normal effort 	  Gastrointestinal:  Soft, Non-tender, + BS	  Skin: No rashes, No ecchymoses, No cyanosis, warm to touch  Musculoskeletal: Normal range of motion, normal strength  Psychiatry:  Mood & affect appropriate    TELEMETRY: 	 Sinus PAF to 130          ASSESSMENT/PLAN: 	89y Female hx of Afib, chol, htn, cad, chf, dementia , now resp distress, NSTEMI, chf excab    Cont Bipap    asa, statin , BB   A/C with heparin gtt , hold eliquis at present   Abx per ICU  tele stable rate and rhythm  diuresis with IV lasix  check daily weights   GI / DVT prophylaxis.   keep K>4, mag >2.0   trend creatine, could cont daily ACE   pulm fiollow up cont Steroid taper

## 2017-11-14 NOTE — PROGRESS NOTE ADULT - ASSESSMENT
89 y/o F pt from [facility] with PMHx of AFib, Dementia, Heart Failure, HLD, HTN, Hypothyroidism, NSTEMI, Osteoporosis, and COPD and no significant PSHx BIB EMS from nursing home to ED with difficulty breathing noted today. Per EMS, pt with O2Sat of 52% on room air; pt was placed on a CPAP en route to ED. EMS reports no medications were given to pt during the transport to ED. Pt is DNR/DNI on MOLST, per [facility] paperwork and confirmed with pt and his son. Pt unable to provide detailed Hx and HPI due to condition (respiratory distress). PMD: Dr. Topete.  Pt denies fever, chest pain, Abd pain, nausea, vomiting, or any other complaints. Pt is allergic to Penicillin.  At NH RR 30, SpO2 55-85% on RA, T 97 F, HR 85, /90.  In ED pt RR 28, SpO2 91%, saturating 98% on 40% b 98%. Pt is Alert and orientedx2.  Pt is being admitted to ICU for Acute hypoxic Hypercapneic resp failure sec to Acute on Chronic CHF exacerbation and COPD exacerbation likely sec to HCAP/ Acute Bronchitis given leukocytosis requiring new Bipap.       Problem/Plan - 1:  ·  Problem: Acute respiratory failure with hypoxia and hypercapnia.  Plan: Pt has SOB and b/l 1+ pitting edema, with hypoxia elevated pro BNP 12,000 and mildly congested CXR with due to Acute hypoxic Hypercapneic resp failure sec to Acute on Chronic CHF exacerbation and COPD exacerbation likely sec to HCAP/ Acute Bronchitis given leukocytosis requiring new Bipap.  Admitted to ICU for new bipap  Patient did not use BIPAP overnight ; saturating well on room air  c/w LASIX 40 MG iv daily  Input and output monitoring, daily weights  c/w solumedrol 40 mg q8 and duonebs for copd exacerbation  will give vanco, azactam as pen allergic for possible HCAP  continue lopressor, losartan, ASA, Lipitor and Heparin drip for Atrial Fibrillation   f/u ECHO : EF of 55-60 % with grade 2 dd and moderate LVH  Cardio Dr. Dailey consulted.      Problem/Plan - 2:  ·  Problem: NSTEMI (non-ST elevated myocardial infarction).  Plan:  Ruled out  Patient had mildly elevated troponins   Was started on heparin drip  However has a normal ECHO  Must be most likely secondary to demand ischemia   Cardio Dr. Dailey.      Problem/Plan - 3:  ·  Problem: Atrial fibrillation, unspecified type.  Plan: Pt has Afib, rate controlled on Lopressor and Amiodarone.  Amiodarone will be discontinued with concern for pulmonary toxicity   continue with lopressor for rate control  holding eliquis as on heparin drip.      Problem/Plan - 4:  ·  Problem: Prophylactic measure.  Plan: IMPROVE score 2, on heparin drip.   Protonix for gi ppx 89 y/o F pt from [facility] with PMHx of AFib, Dementia, Heart Failure, HLD, HTN, Hypothyroidism, NSTEMI, Osteoporosis, and COPD and no significant PSHx BIB EMS from nursing home to ED with difficulty breathing noted today. Per EMS, pt with O2Sat of 52% on room air; pt was placed on a CPAP en route to ED. EMS reports no medications were given to pt during the transport to ED. Pt is DNR/DNI on MOLST, per alf paperwork and confirmed with pt and his son. Pt unable to provide detailed Hx and HPI due to condition (respiratory distress). PMD: Dr. Topete.  Pt denies fever, chest pain, Abd pain, nausea, vomiting, or any other complaints. Pt is allergic to Penicillin.  At NH RR 30, SpO2 55-85% on RA, T 97 F, HR 85, /90.  In ED pt RR 28, SpO2 91%, saturating 98% on 40% b 98%. Pt is Alert and orientedx2.  Pt is being admitted to ICU for Acute hypoxic Hypercapneic resp failure sec to Acute on Chronic CHF exacerbation and COPD exacerbation likely sec to HCAP/ Acute Bronchitis given leukocytosis requiring new Bipap.       Problem/Plan - 1:  ·  Problem: Acute respiratory failure with hypoxia and hypercapnia.  Plan: Pt has SOB and b/l 1+ pitting edema, with hypoxia elevated pro BNP 12,000 and mildly congested CXR with due to Acute hypoxic Hypercapneic resp failure sec to Acute on Chronic CHF exacerbation and COPD exacerbation likely sec to HCAP/ Acute Bronchitis given leukocytosis requiring new Bipap.  Admitted to ICU for new bipap  Patient has not used BIPAP since admission ; saturating well on room air   Input and output monitoring, daily weights  c/w solumedrol 20 mg q8 and duonebs for copd exacerbation  Patient has no fever , no leucocytosis : no sings of infection : antibiotics discontinued   continue lopressor, losartan, ASA, Lipitor for heart failure   f/u ECHO : EF of 55-60 % with grade 2 dd and moderate LVH  Cardio Dr. Dailey consulted.      Problem/Plan - 2:  ·  Problem: NSTEMI (non-ST elevated myocardial infarction).  Plan:  Ruled out  Patient had mildly elevated troponins   cardiac enzymes were re-trended as patient had uncontrolled Atrial Fibrillation overnight ; however ekg is unchanged from before : hence will stop trending now   However has a normal ECHO  Must be most likely secondary to demand ischemia   Cardio Dr. Dailey.      Problem/Plan - 3:  ·  Problem: Atrial fibrillation, unspecified type.  Plan: Pt has Afib, rate controlled on Lopressor   Amiodarone will be discontinued with concern for pulmonary toxicity   continue with lopressor for rate control : continue with 12.5 bid   Digoxin loading was done as patient had rate uncontrolled atrial fibrillation overnight   restarted at home dose of elliquis 2.5 bid for anticoagulation     Problem/Plan - 4:  Problem : Acute kidney injury   Patient has a baseline normal creatinine   However creat today is 1.61  New developement   Patient will be given one extra dose of lasix for adequate diuresis   Albumin for a day to move fluids intracellularly and adequate diuresis  Dr. Bradford was consulted for the same  Nephrotoxic drugs like vancomycin discontinued       Problem/Plan - 5:  ·  Problem: Prophylactic measure.  Plan: IMPROVE score 2, on elliquis , scd boots for dvt px.   Protonix for gi ppx

## 2017-11-14 NOTE — PROGRESS NOTE ADULT - SUBJECTIVE AND OBJECTIVE BOX
INTERVAL HPI/OVERNIGHT EVENTS: ***    PRESSORS: [ ] YES [ ] NO  WHICH:    ANTIBIOTICS:                  DATE STARTED:  ANTIBIOTICS:                  DATE STARTED:  ANTIBIOTICS:                  DATE STARTED:    Antimicrobial:  aztreonam  IVPB 1000 milliGRAM(s) IV Intermittent every 8 hours  vancomycin  IVPB 750 milliGRAM(s) IV Intermittent every 12 hours    Cardiovascular:  digoxin     Tablet 0.25 milliGRAM(s) Oral daily  digoxin  Injectable 0.25 milliGRAM(s) IV Push every 6 hours  furosemide   Injectable 40 milliGRAM(s) IV Push two times a day  losartan 25 milliGRAM(s) Oral daily  metoprolol     tartrate 25 milliGRAM(s) Oral two times a day    Pulmonary:    Hematalogic:  apixaban 2.5 milliGRAM(s) Oral every 12 hours  aspirin  chewable 81 milliGRAM(s) Oral daily    Other:  atorvastatin 80 milliGRAM(s) Oral at bedtime  levothyroxine 50 MICROGram(s) Oral daily  methylPREDNISolone sodium succinate Injectable 40 milliGRAM(s) IV Push every 8 hours  pantoprazole    Tablet 40 milliGRAM(s) Oral before breakfast    apixaban 2.5 milliGRAM(s) Oral every 12 hours  aspirin  chewable 81 milliGRAM(s) Oral daily  atorvastatin 80 milliGRAM(s) Oral at bedtime  aztreonam  IVPB 1000 milliGRAM(s) IV Intermittent every 8 hours  digoxin     Tablet 0.25 milliGRAM(s) Oral daily  digoxin  Injectable 0.25 milliGRAM(s) IV Push every 6 hours  furosemide   Injectable 40 milliGRAM(s) IV Push two times a day  levothyroxine 50 MICROGram(s) Oral daily  losartan 25 milliGRAM(s) Oral daily  methylPREDNISolone sodium succinate Injectable 40 milliGRAM(s) IV Push every 8 hours  metoprolol     tartrate 25 milliGRAM(s) Oral two times a day  pantoprazole    Tablet 40 milliGRAM(s) Oral before breakfast  vancomycin  IVPB 750 milliGRAM(s) IV Intermittent every 12 hours    Drug Dosing Weight    Weight (kg): 70.3 (12 Nov 2017 04:29)    CENTRAL LINE: [ ] YES [ ] NO  LOCATION:   DATE INSERTED:  REMOVE: [ ] YES [ ] NO  EXPLAIN:    FLAHERTY: [ ] YES [ ] NO    DATE INSERTED:  REMOVE:  [ ] YES [ ] NO  EXPLAIN:    A-LINE:  [ ] YES [ ] NO  LOCATION:   DATE INSERTED:  REMOVE:  [ ] YES [ ] NO  EXPLAIN:    PMH -reviewed admission note, no change since admission      ICU Vital Signs Last 24 Hrs  T(C): 35.9 (14 Nov 2017 06:00), Max: 36.8 (13 Nov 2017 12:00)  T(F): 96.6 (14 Nov 2017 06:00), Max: 98.2 (13 Nov 2017 12:00)  HR: 68 (14 Nov 2017 07:00) (68 - 141)  BP: 119/74 (14 Nov 2017 07:00) (89/51 - 138/88)  BP(mean): 85 (14 Nov 2017 07:00) (61 - 99)  ABP: --  ABP(mean): --  RR: 19 (14 Nov 2017 07:00) (13 - 27)  SpO2: 98% (14 Nov 2017 07:00) (91% - 100%)            11-13 @ 07:01  -  11-14 @ 07:00  --------------------------------------------------------  IN: 938 mL / OUT: 475 mL / NET: 463 mL            PHYSICAL EXAM:    GENERAL: [x ]NAD, [x ]well-groomed, [ x]well-developed  HEAD:  [x ]Atraumatic, [x ]Normocephalic  EYES: [ x]EOMI, [x ]PERRLA, [ x]conjunctiva and sclera clear  ENMT: [x ]No tonsillar erythema, exudates, or enlargement; [x ]Moist mucous membranes, [ ]Good dentition, [ ]No lesions  NECK: [x ]Supple, normal appearance,   NERVOUS SYSTEM:  [x ]Alert & Oriented X3; [ x]Motor Strength 5/5 B/L upper and lower extremities; [ x]DTRs 2+ intact and symmetric  CHEST/LUNG: [x ]No chest deformity; [x ]Normal percussion bilaterally; [ x]No rales, rhonchi, wheezing   HEART: [ x]Regular rate and rhythm; [x ]No murmurs, rubs, or gallops  ABDOMEN: [x ]Soft, Nontender, Nondistended; [x ]Bowel sounds present  EXTREMITIES:  [x ]2+ Peripheral Pulses, [x ]No clubbing, cyanosis, or edema  LYMPH: [ x]No lymphadenopathy noted  SKIN: [x ]No rashes or lesions; [ x]Good capillary refill      LABS:  CBC Full  -  ( 14 Nov 2017 05:58 )  WBC Count : 13.7 K/uL  Hemoglobin : 11.1 g/dL  Hematocrit : 33.9 %  Platelet Count - Automated : 238 K/uL  Mean Cell Volume : 96.8 fl  Mean Cell Hemoglobin : 31.7 pg  Mean Cell Hemoglobin Concentration : 32.8 gm/dL  Auto Neutrophil # : x  Auto Lymphocyte # : x  Auto Monocyte # : x  Auto Eosinophil # : x  Auto Basophil # : x  Auto Neutrophil % : x  Auto Lymphocyte % : x  Auto Monocyte % : x  Auto Eosinophil % : x  Auto Basophil % : x    11-14    138  |  104  |  57<H>  ----------------------------<  150<H>  3.6   |  27  |  1.61<H>    Ca    8.7      14 Nov 2017 01:25  Phos  3.6     11-14  Mg     2.2     11-14      PT/INR - ( 12 Nov 2017 20:53 )   PT: 13.7 sec;   INR: 1.25 ratio         PTT - ( 13 Nov 2017 06:18 )  PTT:34.9 sec    Culture Results:   No growth to date. (11-12 @ 12:13)  Culture Results:   No growth to date. (11-12 @ 12:13)  Culture Results:   No growth (11-12 @ 12:03)      RADIOLOGY & ADDITIONAL STUDIES REVIEWED:  ***    [ ]GOALS OF CARE DISCUSSION WITH PATIENT/FAMILY/PROXY:    CRITICAL CARE TIME SPENT: 35 minutes INTERVAL HPI/OVERNIGHT EVENTS: Patient has rapid     PRESSORS: [ ] YES [ ] NO  WHICH:    ANTIBIOTICS:                  DATE STARTED:  ANTIBIOTICS:                  DATE STARTED:  ANTIBIOTICS:                  DATE STARTED:    Antimicrobial:  aztreonam  IVPB 1000 milliGRAM(s) IV Intermittent every 8 hours  vancomycin  IVPB 750 milliGRAM(s) IV Intermittent every 12 hours    Cardiovascular:  digoxin     Tablet 0.25 milliGRAM(s) Oral daily  digoxin  Injectable 0.25 milliGRAM(s) IV Push every 6 hours  furosemide   Injectable 40 milliGRAM(s) IV Push two times a day  losartan 25 milliGRAM(s) Oral daily  metoprolol     tartrate 25 milliGRAM(s) Oral two times a day    Pulmonary:    Hematalogic:  apixaban 2.5 milliGRAM(s) Oral every 12 hours  aspirin  chewable 81 milliGRAM(s) Oral daily    Other:  atorvastatin 80 milliGRAM(s) Oral at bedtime  levothyroxine 50 MICROGram(s) Oral daily  methylPREDNISolone sodium succinate Injectable 40 milliGRAM(s) IV Push every 8 hours  pantoprazole    Tablet 40 milliGRAM(s) Oral before breakfast    apixaban 2.5 milliGRAM(s) Oral every 12 hours  aspirin  chewable 81 milliGRAM(s) Oral daily  atorvastatin 80 milliGRAM(s) Oral at bedtime  aztreonam  IVPB 1000 milliGRAM(s) IV Intermittent every 8 hours  digoxin     Tablet 0.25 milliGRAM(s) Oral daily  digoxin  Injectable 0.25 milliGRAM(s) IV Push every 6 hours  furosemide   Injectable 40 milliGRAM(s) IV Push two times a day  levothyroxine 50 MICROGram(s) Oral daily  losartan 25 milliGRAM(s) Oral daily  methylPREDNISolone sodium succinate Injectable 40 milliGRAM(s) IV Push every 8 hours  metoprolol     tartrate 25 milliGRAM(s) Oral two times a day  pantoprazole    Tablet 40 milliGRAM(s) Oral before breakfast  vancomycin  IVPB 750 milliGRAM(s) IV Intermittent every 12 hours    Drug Dosing Weight    Weight (kg): 70.3 (12 Nov 2017 04:29)    CENTRAL LINE: [ ] YES [ ] NO  LOCATION:   DATE INSERTED:  REMOVE: [ ] YES [ ] NO  EXPLAIN:    FLAHERTY: [ ] YES [ ] NO    DATE INSERTED:  REMOVE:  [ ] YES [ ] NO  EXPLAIN:    A-LINE:  [ ] YES [ ] NO  LOCATION:   DATE INSERTED:  REMOVE:  [ ] YES [ ] NO  EXPLAIN:    PMH -reviewed admission note, no change since admission      ICU Vital Signs Last 24 Hrs  T(C): 35.9 (14 Nov 2017 06:00), Max: 36.8 (13 Nov 2017 12:00)  T(F): 96.6 (14 Nov 2017 06:00), Max: 98.2 (13 Nov 2017 12:00)  HR: 68 (14 Nov 2017 07:00) (68 - 141)  BP: 119/74 (14 Nov 2017 07:00) (89/51 - 138/88)  BP(mean): 85 (14 Nov 2017 07:00) (61 - 99)  ABP: --  ABP(mean): --  RR: 19 (14 Nov 2017 07:00) (13 - 27)  SpO2: 98% (14 Nov 2017 07:00) (91% - 100%)            11-13 @ 07:01  -  11-14 @ 07:00  --------------------------------------------------------  IN: 938 mL / OUT: 475 mL / NET: 463 mL            PHYSICAL EXAM:    GENERAL: [x ]NAD, [x ]well-groomed, [ x]well-developed  HEAD:  [x ]Atraumatic, [x ]Normocephalic  EYES: [ x]EOMI, [x ]PERRLA, [ x]conjunctiva and sclera clear  ENMT: [x ]No tonsillar erythema, exudates, or enlargement; [x ]Moist mucous membranes, [ ]Good dentition, [ ]No lesions  NECK: [x ]Supple, normal appearance,   NERVOUS SYSTEM:  [x ]Alert & Oriented X3; [ x]Motor Strength 5/5 B/L upper and lower extremities; [ x]DTRs 2+ intact and symmetric  CHEST/LUNG: [x ]No chest deformity; [x ]Normal percussion bilaterally; [ x]No rales, rhonchi, wheezing   HEART: [ x]Regular rate and rhythm; [x ]No murmurs, rubs, or gallops  ABDOMEN: [x ]Soft, Nontender, Nondistended; [x ]Bowel sounds present  EXTREMITIES:  [x ]2+ Peripheral Pulses, [x ]No clubbing, cyanosis, or edema  LYMPH: [ x]No lymphadenopathy noted  SKIN: [x ]No rashes or lesions; [ x]Good capillary refill      LABS:  CBC Full  -  ( 14 Nov 2017 05:58 )  WBC Count : 13.7 K/uL  Hemoglobin : 11.1 g/dL  Hematocrit : 33.9 %  Platelet Count - Automated : 238 K/uL  Mean Cell Volume : 96.8 fl  Mean Cell Hemoglobin : 31.7 pg  Mean Cell Hemoglobin Concentration : 32.8 gm/dL  Auto Neutrophil # : x  Auto Lymphocyte # : x  Auto Monocyte # : x  Auto Eosinophil # : x  Auto Basophil # : x  Auto Neutrophil % : x  Auto Lymphocyte % : x  Auto Monocyte % : x  Auto Eosinophil % : x  Auto Basophil % : x    11-14    138  |  104  |  57<H>  ----------------------------<  150<H>  3.6   |  27  |  1.61<H>    Ca    8.7      14 Nov 2017 01:25  Phos  3.6     11-14  Mg     2.2     11-14      PT/INR - ( 12 Nov 2017 20:53 )   PT: 13.7 sec;   INR: 1.25 ratio         PTT - ( 13 Nov 2017 06:18 )  PTT:34.9 sec    Culture Results:   No growth to date. (11-12 @ 12:13)  Culture Results:   No growth to date. (11-12 @ 12:13)  Culture Results:   No growth (11-12 @ 12:03)      RADIOLOGY & ADDITIONAL STUDIES REVIEWED:  ***    [ ]GOALS OF CARE DISCUSSION WITH PATIENT/FAMILY/PROXY:    CRITICAL CARE TIME SPENT: 35 minutes INTERVAL HPI/OVERNIGHT EVENTS: Patient has rapid atrial fibrillation ; Was given 2 doses of lopressor ( 5 iv push ) and started on digoxin loading dose ; Cardiac enzymes were drawn : Troponin ( 0.82) - will continue to trend      PRESSORS: [ ] YES [ x] NO  WHICH:    ANTIBIOTICS:  aztronam                 DATE STARTED: 11/12  ANTIBIOTICS:    vancomycin               DATE STARTED: 11/12  ANTIBIOTICS:                  DATE STARTED:    Antimicrobial:  aztreonam  IVPB 1000 milliGRAM(s) IV Intermittent every 8 hours  vancomycin  IVPB 750 milliGRAM(s) IV Intermittent every 12 hours    Cardiovascular:  digoxin     Tablet 0.25 milliGRAM(s) Oral daily  digoxin  Injectable 0.25 milliGRAM(s) IV Push every 6 hours  furosemide   Injectable 40 milliGRAM(s) IV Push two times a day  losartan 25 milliGRAM(s) Oral daily  metoprolol     tartrate 25 milliGRAM(s) Oral two times a day    Pulmonary:    Hematalogic:  apixaban 2.5 milliGRAM(s) Oral every 12 hours  aspirin  chewable 81 milliGRAM(s) Oral daily    Other:  atorvastatin 80 milliGRAM(s) Oral at bedtime  levothyroxine 50 MICROGram(s) Oral daily  methylPREDNISolone sodium succinate Injectable 40 milliGRAM(s) IV Push every 8 hours  pantoprazole    Tablet 40 milliGRAM(s) Oral before breakfast    apixaban 2.5 milliGRAM(s) Oral every 12 hours  aspirin  chewable 81 milliGRAM(s) Oral daily  atorvastatin 80 milliGRAM(s) Oral at bedtime  aztreonam  IVPB 1000 milliGRAM(s) IV Intermittent every 8 hours  digoxin     Tablet 0.25 milliGRAM(s) Oral daily  digoxin  Injectable 0.25 milliGRAM(s) IV Push every 6 hours  furosemide   Injectable 40 milliGRAM(s) IV Push two times a day  levothyroxine 50 MICROGram(s) Oral daily  losartan 25 milliGRAM(s) Oral daily  methylPREDNISolone sodium succinate Injectable 40 milliGRAM(s) IV Push every 8 hours  metoprolol     tartrate 25 milliGRAM(s) Oral two times a day  pantoprazole    Tablet 40 milliGRAM(s) Oral before breakfast  vancomycin  IVPB 750 milliGRAM(s) IV Intermittent every 12 hours    Drug Dosing Weight    Weight (kg): 70.3 (12 Nov 2017 04:29)    CENTRAL LINE: [ ] YES [x ] NO  LOCATION:   DATE INSERTED:  REMOVE: [ ] YES [ ] NO  EXPLAIN:    FLAHERTY: [ ] YES [x ] NO    DATE INSERTED: 11/12  REMOVE:  [ x] YES [ ] NO  EXPLAIN:    A-LINE:  [ ] YES [x ] NO  LOCATION:   DATE INSERTED:  REMOVE:  [ ] YES [ ] NO  EXPLAIN:    PMH -reviewed admission note, no change since admission      ICU Vital Signs Last 24 Hrs  T(C): 35.9 (14 Nov 2017 06:00), Max: 36.8 (13 Nov 2017 12:00)  T(F): 96.6 (14 Nov 2017 06:00), Max: 98.2 (13 Nov 2017 12:00)  HR: 68 (14 Nov 2017 07:00) (68 - 141)  BP: 119/74 (14 Nov 2017 07:00) (89/51 - 138/88)  BP(mean): 85 (14 Nov 2017 07:00) (61 - 99)  ABP: --  ABP(mean): --  RR: 19 (14 Nov 2017 07:00) (13 - 27)  SpO2: 98% (14 Nov 2017 07:00) (91% - 100%)            11-13 @ 07:01  -  11-14 @ 07:00  --------------------------------------------------------  IN: 938 mL / OUT: 475 mL / NET: 463 mL            PHYSICAL EXAM:    GENERAL: [x ]NAD, [x ]well-groomed, [ x]well-developed  HEAD:  [x ]Atraumatic, [x ]Normocephalic  EYES: [ x]EOMI, [x ]PERRLA, [ x]conjunctiva and sclera clear  ENMT: [x ]No tonsillar erythema, exudates, or enlargement; [x ]Moist mucous membranes,  NECK: [x ]Supple, normal appearance,   NERVOUS SYSTEM:  [x ]Alert & Oriented X3; [ x]Motor Strength 5/5 B/L upper and lower extremities; [ x]DTRs 2+ intact and symmetric  CHEST/LUNG: [x ]No chest deformity; [x ]Normal percussion bilaterally; bilateral basilar crackles   HEART: irregularly irregular pulse ; [x ]No murmurs, rubs, or gallops  ABDOMEN: [x ]Soft, Nontender, Nondistended; [x ]Bowel sounds present  EXTREMITIES:  [x ]2+ Peripheral Pulses, [x ]No clubbing, cyanosis, or edema  LYMPH: [ x]No lymphadenopathy noted  SKIN: [x ]No rashes or lesions; [ x]Good capillary refill      LABS:  CBC Full  -  ( 14 Nov 2017 05:58 )  WBC Count : 13.7 K/uL  Hemoglobin : 11.1 g/dL  Hematocrit : 33.9 %  Platelet Count - Automated : 238 K/uL  Mean Cell Volume : 96.8 fl  Mean Cell Hemoglobin : 31.7 pg  Mean Cell Hemoglobin Concentration : 32.8 gm/dL  Auto Neutrophil # : x  Auto Lymphocyte # : x  Auto Monocyte # : x  Auto Eosinophil # : x  Auto Basophil # : x  Auto Neutrophil % : x  Auto Lymphocyte % : x  Auto Monocyte % : x  Auto Eosinophil % : x  Auto Basophil % : x    11-14    138  |  104  |  57<H>  ----------------------------<  150<H>  3.6   |  27  |  1.61<H>    Ca    8.7      14 Nov 2017 01:25  Phos  3.6     11-14  Mg     2.2     11-14      PT/INR - ( 12 Nov 2017 20:53 )   PT: 13.7 sec;   INR: 1.25 ratio         PTT - ( 13 Nov 2017 06:18 )  PTT:34.9 sec    Culture Results:   No growth to date. (11-12 @ 12:13)  Culture Results:   No growth to date. (11-12 @ 12:13)  Culture Results:   No growth (11-12 @ 12:03)      RADIOLOGY & ADDITIONAL STUDIES REVIEWED:  ***    [x ]GOALS OF CARE DISCUSSION WITH PATIENT/FAMILY/PROXY: DNR/NI    CRITICAL CARE TIME SPENT: 35 minutes

## 2017-11-14 NOTE — CHART NOTE - NSCHARTNOTEFT_GEN_A_CORE
Upon Nutritional Assessment by the Registered Dietitian your patient was determined to meet criteria / has evidence of the following diagnosis/diagnoses:          [ ]  Mild Protein Calorie Malnutrition        [x ]  Moderate Protein Calorie Malnutrition        [ ] Severe Protein Calorie Malnutrition        [ ] Unspecified Protein Calorie Malnutrition        [ ] Underweight / BMI <19        [ ] Morbid Obesity / BMI > 40      Findings as based on:  •  Comprehensive nutrition assessment and consultation  •  Calorie counts (nutrient intake analysis)  •  Food acceptance and intake status from observations by staff  •  Follow up  •  Patient education  •  Intervention secondary to interdisciplinary rounds  •   concerns      Treatment:    The following diet has been recommended:  Add Enlive BID  PROVIDER Section:     By signing this assessment you are acknowledging and agree with the diagnosis/diagnoses assigned by the Registered Dietitian    Comments:

## 2017-11-14 NOTE — PROGRESS NOTE ADULT - SUBJECTIVE AND OBJECTIVE BOX
Patient is a 89y old  Female who presents with a chief complaint of shortness of breath/CHF exac/copd exac/HCAP, NSEMI, cardiology follow up/ID follow up.      INTERVAL HPI/OVERNIGHT EVENTS:  T(C): 35.9 (11-14-17 @ 06:00), Max: 36.8 (11-13-17 @ 12:00)  HR: 71 (11-14-17 @ 10:00) (68 - 141)  BP: 103/53 (11-14-17 @ 10:00) (89/51 - 146/84)  RR: 16 (11-14-17 @ 10:00) (13 - 27)  SpO2: 98% (11-14-17 @ 10:00) (91% - 100%)  Wt(kg): --    LABS:                        11.1   13.7  )-----------( 238      ( 14 Nov 2017 05:58 )             33.9     11-14    138  |  104  |  57<H>  ----------------------------<  150<H>  3.6   |  27  |  1.61<H>    Ca    8.7      14 Nov 2017 01:25  Phos  3.6     11-14  Mg     2.2     11-14      PT/INR - ( 12 Nov 2017 20:53 )   PT: 13.7 sec;   INR: 1.25 ratio         PTT - ( 13 Nov 2017 06:18 )  PTT:34.9 sec    CAPILLARY BLOOD GLUCOSE      Culture - Blood (11.12.17 @ 12:13)    Specimen Source: .Blood Blood-Peripheral    Culture Results:   No growth to date.          RADIOLOGY & ADDITIONAL TESTS:  < from: Xray Chest 1 View AP-PORTABLE IMMEDIATE (11.14.17 @ 09:52) >  IMPRESSION:  Mild diffuse interstitial prominence, minimally worsened since the prior   study. Small bilateral pleural effusions and adjacent atelectasis. No   pneumothorax.    The cardiac silhouette is not accurately assessed by AP technique.   Calcified aorta.    < end of copied text >    Consultant(s) Notes Reviewed:  [x ] YES  [ ] NO    PHYSICAL EXAM:  GENERAL: well built, well nourished  HEAD:  Atraumatic, Normocephalic  EYES: EOMI, PERRLA, conjunctiva and sclera clear  ENT: No tonsillar erythema, exudates, or enlargement; Moist mucous membranes, Good dentition, No lesions  NECK: Supple, No JVD, Normal thyroid, no enlarged nodes  NERVOUS SYSTEM:  Alert & Oriented X3, Good concentration; Motor Strength 5/5 B/L upper and lower extremities; DTRs 2+ intact and symmetric, sensory intact  CHEST/LUNG: B/L crackles positive  HEART: S1S2 normal, no S3, Regular rate and rhythm; No murmurs, rubs, or gallops  ABDOMEN: Soft, Nontender, Nondistended; Bowel sounds present  EXTREMITIES:  2+ Peripheral Pulses, No clubbing, cyanosis, mild  edema  LYMPH: No lymphadenopathy noted  SKIN: No rashes or lesions    Care Discussed with Consultants/Other Providers [ x] YES  [ ] NO

## 2017-11-14 NOTE — PHYSICAL THERAPY INITIAL EVALUATION ADULT - DIAGNOSIS, PT EVAL
weakness; impaired cardiopulmonary status, shortness of breath with activity; limited activity; difficulty performing transfers and ambulation

## 2017-11-15 DIAGNOSIS — F03.90 UNSPECIFIED DEMENTIA WITHOUT BEHAVIORAL DISTURBANCE: ICD-10-CM

## 2017-11-15 DIAGNOSIS — J44.9 CHRONIC OBSTRUCTIVE PULMONARY DISEASE, UNSPECIFIED: ICD-10-CM

## 2017-11-15 DIAGNOSIS — I50.9 HEART FAILURE, UNSPECIFIED: ICD-10-CM

## 2017-11-15 DIAGNOSIS — N17.9 ACUTE KIDNEY FAILURE, UNSPECIFIED: ICD-10-CM

## 2017-11-15 DIAGNOSIS — Z71.89 OTHER SPECIFIED COUNSELING: ICD-10-CM

## 2017-11-15 DIAGNOSIS — I48.91 UNSPECIFIED ATRIAL FIBRILLATION: ICD-10-CM

## 2017-11-15 DIAGNOSIS — E03.9 HYPOTHYROIDISM, UNSPECIFIED: ICD-10-CM

## 2017-11-15 LAB
ANION GAP SERPL CALC-SCNC: 6 MMOL/L — SIGNIFICANT CHANGE UP (ref 5–17)
BUN SERPL-MCNC: 54 MG/DL — HIGH (ref 7–18)
CALCIUM SERPL-MCNC: 8.8 MG/DL — SIGNIFICANT CHANGE UP (ref 8.4–10.5)
CHLORIDE SERPL-SCNC: 101 MMOL/L — SIGNIFICANT CHANGE UP (ref 96–108)
CO2 SERPL-SCNC: 33 MMOL/L — HIGH (ref 22–31)
CREAT SERPL-MCNC: 1.43 MG/DL — HIGH (ref 0.5–1.3)
DIGOXIN SERPL-MCNC: 3.6 NG/ML — CRITICAL HIGH (ref 0.8–2)
GLUCOSE SERPL-MCNC: 146 MG/DL — HIGH (ref 70–99)
HCT VFR BLD CALC: 33.4 % — LOW (ref 34.5–45)
HGB BLD-MCNC: 11.3 G/DL — LOW (ref 11.5–15.5)
MCHC RBC-ENTMCNC: 32.6 PG — SIGNIFICANT CHANGE UP (ref 27–34)
MCHC RBC-ENTMCNC: 33.8 GM/DL — SIGNIFICANT CHANGE UP (ref 32–36)
MCV RBC AUTO: 96.4 FL — SIGNIFICANT CHANGE UP (ref 80–100)
PLATELET # BLD AUTO: 189 K/UL — SIGNIFICANT CHANGE UP (ref 150–400)
POTASSIUM SERPL-MCNC: 3.9 MMOL/L — SIGNIFICANT CHANGE UP (ref 3.5–5.3)
POTASSIUM SERPL-SCNC: 3.9 MMOL/L — SIGNIFICANT CHANGE UP (ref 3.5–5.3)
RBC # BLD: 3.47 M/UL — LOW (ref 3.8–5.2)
RBC # FLD: 12.5 % — SIGNIFICANT CHANGE UP (ref 10.3–14.5)
SODIUM SERPL-SCNC: 140 MMOL/L — SIGNIFICANT CHANGE UP (ref 135–145)
TROPONIN I SERPL-MCNC: 1.56 NG/ML — HIGH (ref 0–0.04)
WBC # BLD: 9.3 K/UL — SIGNIFICANT CHANGE UP (ref 3.8–10.5)
WBC # FLD AUTO: 9.3 K/UL — SIGNIFICANT CHANGE UP (ref 3.8–10.5)

## 2017-11-15 PROCEDURE — 99497 ADVNCD CARE PLAN 30 MIN: CPT

## 2017-11-15 RX ORDER — FUROSEMIDE 40 MG
40 TABLET ORAL DAILY
Qty: 0 | Refills: 0 | Status: DISCONTINUED | OUTPATIENT
Start: 2017-11-16 | End: 2017-11-16

## 2017-11-15 RX ADMIN — Medication 40 MILLIGRAM(S): at 06:32

## 2017-11-15 RX ADMIN — Medication 20 MILLIGRAM(S): at 21:49

## 2017-11-15 RX ADMIN — APIXABAN 2.5 MILLIGRAM(S): 2.5 TABLET, FILM COATED ORAL at 06:32

## 2017-11-15 RX ADMIN — ATORVASTATIN CALCIUM 80 MILLIGRAM(S): 80 TABLET, FILM COATED ORAL at 21:50

## 2017-11-15 RX ADMIN — Medication 20 MILLIGRAM(S): at 06:32

## 2017-11-15 RX ADMIN — Medication 50 MICROGRAM(S): at 06:32

## 2017-11-15 RX ADMIN — Medication 12.5 MILLIGRAM(S): at 17:35

## 2017-11-15 RX ADMIN — Medication 81 MILLIGRAM(S): at 12:00

## 2017-11-15 RX ADMIN — APIXABAN 2.5 MILLIGRAM(S): 2.5 TABLET, FILM COATED ORAL at 17:35

## 2017-11-15 RX ADMIN — PANTOPRAZOLE SODIUM 40 MILLIGRAM(S): 20 TABLET, DELAYED RELEASE ORAL at 06:32

## 2017-11-15 RX ADMIN — Medication 20 MILLIGRAM(S): at 14:06

## 2017-11-15 RX ADMIN — Medication 12.5 MILLIGRAM(S): at 06:32

## 2017-11-15 NOTE — PROGRESS NOTE ADULT - ATTENDING COMMENTS
89 female with hx of CHF, COPD, Afib, presents with acute resp failure requiring bipap, likely due to CHF exacerbation/pulmonary edema, also with NSTEMI.  Completed  abx for pneumonia. Downgraded to SCU 11/14/17.     Problem/Plan - 1:  ·  Problem: Acute respiratory failure with hypoxia and hypercapnia.  Plan: Admitted to ICU for new bipap  now saturating well on nasal cannula no longer requiring bipap  will taper steroids.      Problem/Plan - 2:  ·  Problem: NSTEMI (non-ST elevated myocardial infarction).  Plan: C/W asa, Lipitor, lopressor.      Problem/Plan - 3:  ·  Problem: CHF exacerbation.  Plan: decrease lasix to daily  daily weights  I's and O's.      Problem/Plan - 4:  ·  Problem: Atrial fibrillation.  Plan: c/w Eliquis  c/w lopressor  Dig remains on hold 2/2 elevated level, however level is trending down (3.6 today, 4.8 yesterday).      Problem/Plan - 5:  ·  Problem: COPD (chronic obstructive pulmonary disease).  Plan: supplemental 02 as needed  solumedrol taper.      Problem/Plan - 6:  Problem: Dementia. Plan: c/w supportive care.

## 2017-11-15 NOTE — PROGRESS NOTE ADULT - PROBLEM SELECTOR PLAN 1
Suspect MOE from cardiorenal syndrome v ATN.   Electrolytes acceptable.   Much improved volume status and respiratory symptoms.   Can lower lasix dose to once daily. Maintain I<O.  High dig levels noted, lower dose to every other day, and repeat levels.   ACE/ARB on hold due to MOE. Consider repeat Trp, given rising values.

## 2017-11-15 NOTE — PROGRESS NOTE ADULT - SUBJECTIVE AND OBJECTIVE BOX
Patient is a 89y old  Female who presents with a chief complaint of shortness of breath/CHF exac/NSTEMI, improved down grade to floor      INTERVAL HPI/OVERNIGHT EVENTS:  T(C): 36.6 (11-15-17 @ 13:11), Max: 36.6 (11-15-17 @ 13:11)  HR: 53 (11-15-17 @ 13:11) (53 - 73)  BP: 141/59 (11-15-17 @ 13:11) (100/40 - 142/56)  RR: 16 (11-15-17 @ 13:11) (13 - 26)  SpO2: 98% (11-15-17 @ 13:11) (95% - 99%)  Wt(kg): --    LABS:                        11.3   9.3   )-----------( 189      ( 15 Nov 2017 06:50 )             33.4     11-15    140  |  101  |  54<H>  ----------------------------<  146<H>  3.9   |  33<H>  |  1.43<H>    Ca    8.8      15 Nov 2017 13:01  Phos  3.6     11-14  Mg     2.2     11-14          CAPILLARY BLOOD GLUCOSE            RADIOLOGY & ADDITIONAL TESTS:  < from: Xray Chest 1 View AP-PORTABLE IMMEDIATE (11.14.17 @ 09:52) >  IMPRESSION:  Mild diffuse interstitial prominence, minimally worsened since the prior   study. Small bilateral pleural effusions and adjacent atelectasis. No   pneumothorax.    The cardiac silhouette is not accurately assessed by AP technique.   Calcified aorta.    < end of copied text >    Consultant(s) Notes Reviewed:  [x ] YES  [ ] NO    PHYSICAL EXAM:  GENERAL: well built, well nourished  HEAD:  Atraumatic, Normocephalic  EYES: EOMI, PERRLA, conjunctiva and sclera clear  ENT: No tonsillar erythema, exudates, or enlargement; Moist mucous membranes, Good dentition, No lesions  NECK: Supple, No JVD, Normal thyroid, no enlarged nodes  NERVOUS SYSTEM:  Alert & Oriented X3, Good concentration; Motor Strength 5/5 B/L upper and lower extremities; DTRs 2+ intact and symmetric, sensory intact  CHEST/LUNG: B/L good air entry; No rales, rhonchi, or wheezing  HEART: S1S2 normal, no S3, Regular rate and rhythm; No murmurs, rubs, or gallops  ABDOMEN: Soft, Nontender, Nondistended; Bowel sounds present  EXTREMITIES:  2+ Peripheral Pulses, No clubbing, cyanosis, or edema  LYMPH: No lymphadenopathy noted  SKIN: No rashes or lesions    Care Discussed with Consultants/Other Providers [ x] YES  [ ] NO

## 2017-11-15 NOTE — PROGRESS NOTE ADULT - PROBLEM SELECTOR PLAN 9
spoke with patient and son, Devon, at d. Both agree to MOLST form   see goc documentation in [facility]

## 2017-11-15 NOTE — PROGRESS NOTE ADULT - SUBJECTIVE AND OBJECTIVE BOX
Pt seen and examined at bedside  No acute events overnight. Pt unable to provide much information.   Shes sitting up in chair, appears comfortable, without respiratory distress.     Allergies:  penicillin (Unknown)    Hospital Medications:   MEDICATIONS  (STANDING):  apixaban 2.5 milliGRAM(s) Oral every 12 hours  aspirin  chewable 81 milliGRAM(s) Oral daily  atorvastatin 80 milliGRAM(s) Oral at bedtime  digoxin     Tablet 0.25 milliGRAM(s) Oral daily  levothyroxine 50 MICROGram(s) Oral daily  methylPREDNISolone sodium succinate Injectable 20 milliGRAM(s) IV Push every 8 hours  metoprolol     tartrate 12.5 milliGRAM(s) Oral two times a day  pantoprazole    Tablet 40 milliGRAM(s) Oral before breakfast      VITALS:  T(F): 97.3 (11-15-17 @ 05:22), Max: 97.6 (17 @ 17:11)  HR: 55 (11-15-17 @ 05:22)  BP: 120/54 (11-15-17 @ 05:22)  RR: 16 (11-15-17 @ 05:22)  SpO2: 97% (11-15-17 @ 05:22)  Wt(kg): --     @ 07:01  -  11-15 @ 07:00  --------------------------------------------------------  IN: 300 mL / OUT: 100 mL / NET: 200 mL    PHYSICAL EXAM:  Constitutional: NAD  HEENT: anicteric sclera, oropharynx clear, MMM  Neck: No JVD  Respiratory: CTAB, no wheezes, rales or rhonchi  Cardiovascular: S1, S2, RRR  Gastrointestinal: BS+, soft, NT/ND  Extremities: No cyanosis or clubbing. No peripheral edema  Neurological: A/O x 1, no focal deficits  Psychiatric: Normal mood, normal affect  : No CVA tenderness. No quigley.   Skin: No rashes    LABS:      138  |  104  |  57<H>  ----------------------------<  150<H>  3.6   |  27  |  1.61<H>    Ca    8.7      2017 01:25  Phos  3.6     11-14  Mg     2.2     11-14      Creatinine Trend: 1.61 <--, 1.16 <--, 1.23 <--                        11.3   9.3   )-----------( 189      ( 15 Nov 2017 06:50 )             33.4     Urine Studies:  Urinalysis Basic - ( 2017 08:14 )    Color: Yellow / Appearance: Clear / S.010 / pH:   Gluc:  / Ketone: Negative  / Bili: Negative / Urobili: Negative   Blood:  / Protein: Negative / Nitrite: Negative   Leuk Esterase: Negative / RBC: 0-2 /HPF / WBC 0-2 /HPF   Sq Epi:  / Non Sq Epi: Occasional /HPF / Bacteria: Many /HPF        RADIOLOGY & ADDITIONAL STUDIES:

## 2017-11-15 NOTE — PROGRESS NOTE ADULT - PROBLEM SELECTOR PLAN 1
Admitted to ICU for new bipap  now saturating well on nasal cannula no longer requiring bipap  continue with solumedrol taper, Admitted to ICU for new bipap  now saturating well on nasal cannula no longer requiring bipap  will taper steroids

## 2017-11-15 NOTE — PROGRESS NOTE ADULT - ASSESSMENT
89 female with hx of CHF, COPD, Afib, presents with acute resp failure requiring bipap, likely due to CHF exacerbation/pulmonary edema, also with NSTEMI.  Completed  abx for pneumonia. Downgraded to SCU 11/14/17.

## 2017-11-15 NOTE — PROGRESS NOTE ADULT - SUBJECTIVE AND OBJECTIVE BOX
Patient denies CP, SOB, feels better    apixaban 2.5 milliGRAM(s) Oral every 12 hours  aspirin  chewable 81 milliGRAM(s) Oral daily  atorvastatin 80 milliGRAM(s) Oral at bedtime  digoxin     Tablet 0.25 milliGRAM(s) Oral daily  levothyroxine 50 MICROGram(s) Oral daily  methylPREDNISolone sodium succinate Injectable 20 milliGRAM(s) IV Push every 8 hours  metoprolol     tartrate 12.5 milliGRAM(s) Oral two times a day  pantoprazole    Tablet 40 milliGRAM(s) Oral before breakfast                            11.3   9.3   )-----------( 189      ( 15 Nov 2017 06:50 )             33.4       Hemoglobin: 11.3 g/dL (11-15 @ 06:50)  Hemoglobin: 11.1 g/dL (11-14 @ 05:58)  Hemoglobin: 11.6 g/dL (11-14 @ 01:25)  Hemoglobin: 13.6 g/dL (11-13 @ 06:18)  Hemoglobin: 12.7 g/dL (11-12 @ 13:09)      11-14    138  |  104  |  57<H>  ----------------------------<  150<H>  3.6   |  27  |  1.61<H>    Ca    8.7      14 Nov 2017 01:25  Phos  3.6     11-14  Mg     2.2     11-14      Creatinine Trend: 1.61<--, 1.16<--, 1.23<--    COAGS:     CARDIAC MARKERS ( 14 Nov 2017 08:38 )  1.810 ng/mL / x     / 94 U/L / x     / 12.1 ng/mL  CARDIAC MARKERS ( 14 Nov 2017 01:25 )  0.820 ng/mL / x     / 78 U/L / x     / 7.5 ng/mL  CARDIAC MARKERS ( 12 Nov 2017 20:53 )  0.134 ng/mL / x     / 66 U/L / x     / 4.3 ng/mL  CARDIAC MARKERS ( 12 Nov 2017 17:16 )  0.137 ng/mL / x     / 63 U/L / x     / 4.3 ng/mL  CARDIAC MARKERS ( 12 Nov 2017 11:43 )  0.201 ng/mL / x     / 65 U/L / x     / 4.5 ng/mL        T(C): 36.3 (11-15-17 @ 05:22), Max: 36.4 (11-14-17 @ 15:00)  HR: 55 (11-15-17 @ 05:22) (55 - 84)  BP: 120/54 (11-15-17 @ 05:22) (100/40 - 142/56)  RR: 16 (11-15-17 @ 05:22) (13 - 26)  SpO2: 97% (11-15-17 @ 05:22) (95% - 99%)  Wt(kg): --    I&O's Summary    14 Nov 2017 07:01  -  15 Nov 2017 07:00  --------------------------------------------------------  IN: 300 mL / OUT: 100 mL / NET: 200 mL        Appearance: Normal	  HEENT:   Normal oral mucosa, PERRL, EOMI	  Lymphatic: No lymphadenopathy , + decreased edema  Cardiovascular: irreg  S1 S2, No JVD, No murmurs , Peripheral pulses palpable 2+ bilaterally  Respiratory: Lungs clear to auscultation, normal effort 	  Gastrointestinal:  Soft, Non-tender, + BS	  Skin: No rashes, No ecchymoses, No cyanosis, warm to touch  Musculoskeletal: Normal range of motion, normal strength  Psychiatry:  Mood & affect appropriate        ASSESSMENT/PLAN: 	89y Female hx of Afib, chol, htn, cad, chf, dementia , now resp distress, NSTEMI, chf excab    - cont asa, statin BB  - Cont eliquis for afib  - Diastolic CHF, treatment will consist of Beta blockers and diuretics  - Steroid taper per deena Dailey MD, FACC  [facility], [facility]  [address] [place] [address]  PHONE:  [phone]  BEEPER : [phone]

## 2017-11-15 NOTE — PROGRESS NOTE ADULT - PROBLEM SELECTOR PLAN 8
Monitor BMP  avoid nephrotoxic meds MOE in the setting of CHF exac and NSTEMI  Monitor BMP, renal function improving, Sr. Cr. trending down  avoid nephrotoxic meds

## 2017-11-15 NOTE — PROGRESS NOTE ADULT - SUBJECTIVE AND OBJECTIVE BOX
DNR [x ]   DNI  [  x]    INTERVAL HPI/OVERNIGHT EVENTS: Downgraded from ICU; no acute events    PRESSORS: [ ] YES [ ] NO  WHICH:    Cardiovascular:  Normal Left Ventricular Systolic Function,  (EF = 55 to 60%) Grade II diastolic dysfunction.    digoxin     Tablet 0.25 milliGRAM(s) Oral daily  furosemide   Injectable 40 milliGRAM(s) IV Push every 8 hours  metoprolol     tartrate 12.5 milliGRAM(s) Oral two times a day    Pulmonary:    Hematalogic:  apixaban 2.5 milliGRAM(s) Oral every 12 hours  aspirin  chewable 81 milliGRAM(s) Oral daily    Other:  atorvastatin 80 milliGRAM(s) Oral at bedtime  levothyroxine 50 MICROGram(s) Oral daily  methylPREDNISolone sodium succinate Injectable 20 milliGRAM(s) IV Push every 8 hours  pantoprazole    Tablet 40 milliGRAM(s) Oral before breakfast    apixaban 2.5 milliGRAM(s) Oral every 12 hours  aspirin  chewable 81 milliGRAM(s) Oral daily  atorvastatin 80 milliGRAM(s) Oral at bedtime  digoxin     Tablet 0.25 milliGRAM(s) Oral daily  furosemide   Injectable 40 milliGRAM(s) IV Push every 8 hours  levothyroxine 50 MICROGram(s) Oral daily  methylPREDNISolone sodium succinate Injectable 20 milliGRAM(s) IV Push every 8 hours  metoprolol     tartrate 12.5 milliGRAM(s) Oral two times a day  pantoprazole    Tablet 40 milliGRAM(s) Oral before breakfast    Drug Dosing Weight    Weight (kg): 70.3 (12 Nov 2017 04:29)    CENTRAL LINE: [ ] YES [ x] NO  LOCATION:   DATE INSERTED:  REMOVE: [ ] YES [ ] NO  EXPLAIN:    [place]: [ ] YES [x ] NO    DATE INSERTED:  REMOVE:  [ ] YES [ ] NO  EXPLAIN:    A-LINE:  [ ] YES [x ] NO  LOCATION:   DATE INSERTED:  REMOVE:  [ ] YES [ ] NO  EXPLAIN:    PAST MEDICAL & SURGICAL HISTORY:  Hypothyroidism  HLD (hyperlipidemia)  Osteoporosis  Dementia  HTN (hypertension)  Heart failure  NSTEMI (non-ST elevated myocardial infarction)  Atrial fibrillation  COPD (chronic obstructive pulmonary disease)  No significant past surgical history              11-14 @ 07:01  -  11-15 @ 07:00  --------------------------------------------------------  IN: 300 mL / OUT: 100 mL / NET: 200 mL            PHYSICAL EXAM:    GENERAL: OOB-chair in NAD  HEAD:  Atraumatic, Normocephalic  EYES: EOMI, PERRLA, conjunctiva and sclera clear  ENMT: No tonsillar erythema, exudates, or enlargement;  NECK: Supple, No JVD, Normal thyroid  NERVOUS SYSTEM:  Alert , awake, follows simple commands    CHEST/LUNG: Clear to percussion bilaterally; No rales, rhonchi, wheezing, or rubs  HEART: Regular rate and rhythm; No murmurs, rubs, or gallops  ABDOMEN: Soft, Nontender, Nondistended; Bowel sounds present  EXTREMITIES:  2+ Peripheral Pulses, No clubbing, cyanosis, or edema  LYMPH: No lymphadenopathy noted  SKIN: No rashes or lesions      LABS:  CBC Full  -  ( 15 Nov 2017 06:50 )  WBC Count : 9.3 K/uL  Hemoglobin : 11.3 g/dL  Hematocrit : 33.4 %  Platelet Count - Automated : 189 K/uL  Mean Cell Volume : 96.4 fl  Mean Cell Hemoglobin : 32.6 pg  Mean Cell Hemoglobin Concentration : 33.8 gm/dL  Auto Neutrophil # : x  Auto Lymphocyte # : x  Auto Monocyte # : x  Auto Eosinophil # : x  Auto Basophil # : x  Auto Neutrophil % : x  Auto Lymphocyte % : x  Auto Monocyte % : x  Auto Eosinophil % : x  Auto Basophil % : x    11-14    138  |  104  |  57<H>  ----------------------------<  150<H>  3.6   |  27  |  1.61<H>    Ca    8.7      14 Nov 2017 01:25  Phos  3.6     11-14  Mg     2.2     11-14                [x  ]  DVT Prophylaxis - full dose AC  [ x ]  Nutrition, Brand, Rate  Mechanical soft         Goal Rate         Abdominal Nutritional Status -  Malnutrition   Cachexia      Morbid Obesity BMI >/=40    RADIOLOGY & ADDITIONAL STUDIES:  ***    [  ] Goals of Care Discussion with Family/Proxy/Other           Elements of Conversation Discussed: Patient/Family understanding of current illness   Advanced Directives                                                                       Prognosis  Treatment Options  Care Aligned with patient's wishes                                             TIME SPENT: 35 minutes

## 2017-11-15 NOTE — GOALS OF CARE CONVERSATION - PERSONAL ADVANCE DIRECTIVE - CONVERSATION DETAILS
Patient and son, Devon, states that they want DNR/DNI and other treatment options extended to the community and agree with SHERRILL

## 2017-11-15 NOTE — PROGRESS NOTE ADULT - ASSESSMENT
89 yr old male, from NH, H/O A FIB/CHF/COPD/HLD/CAD/dementia, admit hospital for acute respirotary distress/resp failure/shock, admit ICU, S/P BIPAP, IV ABS, elevated BNP/troponin, on heparin drip,cardiology F/U, IV ABS, ICU care, monitor labs. elevated BUN/Cr, IV hydration, monitor labs. patient improved, continue lasix, monitor labs, D/C planning

## 2017-11-16 LAB
ANION GAP SERPL CALC-SCNC: 6 MMOL/L — SIGNIFICANT CHANGE UP (ref 5–17)
BUN SERPL-MCNC: 49 MG/DL — HIGH (ref 7–18)
CALCIUM SERPL-MCNC: 8.9 MG/DL — SIGNIFICANT CHANGE UP (ref 8.4–10.5)
CHLORIDE SERPL-SCNC: 100 MMOL/L — SIGNIFICANT CHANGE UP (ref 96–108)
CO2 SERPL-SCNC: 33 MMOL/L — HIGH (ref 22–31)
CREAT SERPL-MCNC: 1.2 MG/DL — SIGNIFICANT CHANGE UP (ref 0.5–1.3)
DIGOXIN SERPL-MCNC: 2.8 NG/ML — CRITICAL HIGH (ref 0.8–2)
GLUCOSE SERPL-MCNC: 152 MG/DL — HIGH (ref 70–99)
HCT VFR BLD CALC: 37.3 % — SIGNIFICANT CHANGE UP (ref 34.5–45)
HGB BLD-MCNC: 12.1 G/DL — SIGNIFICANT CHANGE UP (ref 11.5–15.5)
MCHC RBC-ENTMCNC: 31.2 PG — SIGNIFICANT CHANGE UP (ref 27–34)
MCHC RBC-ENTMCNC: 32.4 GM/DL — SIGNIFICANT CHANGE UP (ref 32–36)
MCV RBC AUTO: 96.2 FL — SIGNIFICANT CHANGE UP (ref 80–100)
PLATELET # BLD AUTO: 220 K/UL — SIGNIFICANT CHANGE UP (ref 150–400)
POTASSIUM SERPL-MCNC: 3.9 MMOL/L — SIGNIFICANT CHANGE UP (ref 3.5–5.3)
POTASSIUM SERPL-SCNC: 3.9 MMOL/L — SIGNIFICANT CHANGE UP (ref 3.5–5.3)
RBC # BLD: 3.88 M/UL — SIGNIFICANT CHANGE UP (ref 3.8–5.2)
RBC # FLD: 12.2 % — SIGNIFICANT CHANGE UP (ref 10.3–14.5)
SODIUM SERPL-SCNC: 139 MMOL/L — SIGNIFICANT CHANGE UP (ref 135–145)
WBC # BLD: 10.9 K/UL — HIGH (ref 3.8–10.5)
WBC # FLD AUTO: 10.9 K/UL — HIGH (ref 3.8–10.5)

## 2017-11-16 RX ORDER — ACETAMINOPHEN 500 MG
650 TABLET ORAL EVERY 6 HOURS
Qty: 0 | Refills: 0 | Status: DISCONTINUED | OUTPATIENT
Start: 2017-11-16 | End: 2017-11-22

## 2017-11-16 RX ORDER — FUROSEMIDE 40 MG
40 TABLET ORAL DAILY
Qty: 0 | Refills: 0 | Status: DISCONTINUED | OUTPATIENT
Start: 2017-11-16 | End: 2017-11-22

## 2017-11-16 RX ORDER — FUROSEMIDE 40 MG
1 TABLET ORAL
Qty: 0 | Refills: 0 | COMMUNITY

## 2017-11-16 RX ORDER — PANTOPRAZOLE SODIUM 20 MG/1
1 TABLET, DELAYED RELEASE ORAL
Qty: 0 | Refills: 0 | COMMUNITY
Start: 2017-11-16

## 2017-11-16 RX ORDER — AMIODARONE HYDROCHLORIDE 400 MG/1
1 TABLET ORAL
Qty: 0 | Refills: 0 | COMMUNITY

## 2017-11-16 RX ORDER — FUROSEMIDE 40 MG
1 TABLET ORAL
Qty: 30 | Refills: 0 | OUTPATIENT
Start: 2017-11-16 | End: 2017-12-16

## 2017-11-16 RX ORDER — METOPROLOL TARTRATE 50 MG
12.5 TABLET ORAL
Qty: 0 | Refills: 0 | COMMUNITY
Start: 2017-11-16

## 2017-11-16 RX ORDER — METOPROLOL TARTRATE 50 MG
1 TABLET ORAL
Qty: 0 | Refills: 0 | COMMUNITY

## 2017-11-16 RX ADMIN — Medication 81 MILLIGRAM(S): at 12:18

## 2017-11-16 RX ADMIN — Medication 40 MILLIGRAM(S): at 06:31

## 2017-11-16 RX ADMIN — Medication 12.5 MILLIGRAM(S): at 06:31

## 2017-11-16 RX ADMIN — Medication 50 MICROGRAM(S): at 06:31

## 2017-11-16 RX ADMIN — APIXABAN 2.5 MILLIGRAM(S): 2.5 TABLET, FILM COATED ORAL at 06:31

## 2017-11-16 RX ADMIN — ATORVASTATIN CALCIUM 80 MILLIGRAM(S): 80 TABLET, FILM COATED ORAL at 21:26

## 2017-11-16 RX ADMIN — Medication 30 MILLIGRAM(S): at 07:15

## 2017-11-16 RX ADMIN — Medication 12.5 MILLIGRAM(S): at 17:34

## 2017-11-16 RX ADMIN — PANTOPRAZOLE SODIUM 40 MILLIGRAM(S): 20 TABLET, DELAYED RELEASE ORAL at 06:31

## 2017-11-16 RX ADMIN — APIXABAN 2.5 MILLIGRAM(S): 2.5 TABLET, FILM COATED ORAL at 17:34

## 2017-11-16 NOTE — DISCHARGE NOTE ADULT - PLAN OF CARE
Remain stable now saturating well on nasal cannula no longer requiring bipap   taper steroids. c/w Eliquis  c/w lopressor Moderate concentric left ventricular hypertrophy. Normal Left Ventricular Systolic Function,  (EF = 55 to  60%) Grade II diastolic dysfunction. complete prednisone taper supportive care continue with statin c/w synthroid  Call your Health Care provider upon arrival home to make a follow up appointment within one week.  Take all inhalers as prescribed by your Health Care Provider.  Take steroids as prescribed by your Health Care Provider.  If your cough increases infrequency and severity and/or you have shortness of breath or increased shortness of breath call your Health Care Provider.  If you develop fever, chills, night sweats, malaise, and/or change in mental status call your Health care Provider.  Nutrition is very important.  Eat small frequent meals.  Increase your activity as tolerated.  Do not stay in bed all day now saturating well on nasal cannula no longer requiring bipap   completed steroid taper. Moderate concentric left ventricular hypertrophy. Normal Left Ventricular Systolic Function,  (EF = 55 to  60%) Grade II diastolic dysfunction. c/w oral lasix completed prednisone taper

## 2017-11-16 NOTE — PROGRESS NOTE ADULT - ASSESSMENT
89 female with hx of CHF, COPD, Afib, presents with acute resp failure requiring bipap, likely due to CHF exacerbation/pulmonary edema, also with NSTEMI.  Completed abx for pneumonia. Downgraded to SCU 11/14/17. 89 female with hx of CHF, COPD, Afib, presents with acute resp failure requiring bipap, likely due to CHF exacerbation/pulmonary edema, also with NSTEMI.  Completed abx for pneumonia. Downgraded to SCU 11/14/17. DC planning

## 2017-11-16 NOTE — DISCHARGE NOTE ADULT - CARE PLAN
Principal Discharge DX:	Acute respiratory failure with hypoxia and hypercapnia  Goal:	Remain stable  Instructions for follow-up, activity and diet:	now saturating well on nasal cannula no longer requiring bipap   taper steroids.  Secondary Diagnosis:	Atrial fibrillation  Instructions for follow-up, activity and diet:	c/w Eliquis  c/w lopressor  Secondary Diagnosis:	CHF exacerbation  Instructions for follow-up, activity and diet:	Moderate concentric left ventricular hypertrophy. Normal Left Ventricular Systolic Function,  (EF = 55 to  60%) Grade II diastolic dysfunction.  Secondary Diagnosis:	COPD (chronic obstructive pulmonary disease)  Instructions for follow-up, activity and diet:	complete prednisone taper  Secondary Diagnosis:	Dementia  Instructions for follow-up, activity and diet:	supportive care  Secondary Diagnosis:	HLD (hyperlipidemia)  Instructions for follow-up, activity and diet:	continue with statin  Secondary Diagnosis:	Hypothyroidism  Instructions for follow-up, activity and diet:	c/w synthroid  Call your Health Care provider upon arrival home to make a follow up appointment within one week.  Take all inhalers as prescribed by your Health Care Provider.  Take steroids as prescribed by your Health Care Provider.  If your cough increases infrequency and severity and/or you have shortness of breath or increased shortness of breath call your Health Care Provider.  If you develop fever, chills, night sweats, malaise, and/or change in mental status call your Health care Provider.  Nutrition is very important.  Eat small frequent meals.  Increase your activity as tolerated.  Do not stay in bed all day Principal Discharge DX:	Acute respiratory failure with hypoxia and hypercapnia  Goal:	Remain stable  Instructions for follow-up, activity and diet:	now saturating well on nasal cannula no longer requiring bipap   completed steroid taper.  Secondary Diagnosis:	Atrial fibrillation  Instructions for follow-up, activity and diet:	c/w Eliquis  c/w lopressor  Secondary Diagnosis:	CHF exacerbation  Instructions for follow-up, activity and diet:	Moderate concentric left ventricular hypertrophy. Normal Left Ventricular Systolic Function,  (EF = 55 to  60%) Grade II diastolic dysfunction. c/w oral lasix  Secondary Diagnosis:	COPD (chronic obstructive pulmonary disease)  Instructions for follow-up, activity and diet:	completed prednisone taper  Secondary Diagnosis:	Dementia  Instructions for follow-up, activity and diet:	supportive care  Secondary Diagnosis:	HLD (hyperlipidemia)  Instructions for follow-up, activity and diet:	continue with statin  Secondary Diagnosis:	Hypothyroidism  Instructions for follow-up, activity and diet:	c/w synthroid  Call your Health Care provider upon arrival home to make a follow up appointment within one week.  Take all inhalers as prescribed by your Health Care Provider.  Take steroids as prescribed by your Health Care Provider.  If your cough increases infrequency and severity and/or you have shortness of breath or increased shortness of breath call your Health Care Provider.  If you develop fever, chills, night sweats, malaise, and/or change in mental status call your Health care Provider.  Nutrition is very important.  Eat small frequent meals.  Increase your activity as tolerated.  Do not stay in bed all day

## 2017-11-16 NOTE — DISCHARGE NOTE ADULT - SECONDARY DIAGNOSIS.
Atrial fibrillation CHF exacerbation COPD (chronic obstructive pulmonary disease) Dementia HLD (hyperlipidemia) Hypothyroidism

## 2017-11-16 NOTE — DISCHARGE NOTE ADULT - MEDICATION SUMMARY - MEDICATIONS TO STOP TAKING
I will STOP taking the medications listed below when I get home from the hospital:    Pacerone 200 mg oral tablet  -- 1 tab(s) by mouth 2 times a day

## 2017-11-16 NOTE — PROGRESS NOTE ADULT - SUBJECTIVE AND OBJECTIVE BOX
Patient is a 89y old  Female who presents with a chief complaint of shortness of breath /CHF exac/NSTEMI, improved      INTERVAL HPI/OVERNIGHT EVENTS:  T(C): 36.3 (11-16-17 @ 09:50), Max: 36.6 (11-15-17 @ 13:11)  HR: 55 (11-16-17 @ 09:50) (53 - 72)  BP: 148/57 (11-16-17 @ 09:50) (135/50 - 148/57)  RR: 17 (11-16-17 @ 09:50) (16 - 17)  SpO2: 96% (11-16-17 @ 09:50) (94% - 98%)  Wt(kg): --    LABS:                        12.1   10.9  )-----------( 220      ( 16 Nov 2017 07:24 )             37.3     11-15    140  |  101  |  54<H>  ----------------------------<  146<H>  3.9   |  33<H>  |  1.43<H>    Ca    8.8      15 Nov 2017 13:01          CAPILLARY BLOOD GLUCOSE            RADIOLOGY & ADDITIONAL TESTS:    Consultant(s) Notes Reviewed:  [x ] YES  [ ] NO    PHYSICAL EXAM:  GENERAL: well built, well nourished  HEAD:  Atraumatic, Normocephalic  EYES: EOMI, PERRLA, conjunctiva and sclera clear  ENT: No tonsillar erythema, exudates, or enlargement; Moist mucous membranes, Good dentition, No lesions  NECK: Supple, No JVD, Normal thyroid, no enlarged nodes  NERVOUS SYSTEM:  Alert & Oriented X3, Good concentration; Motor Strength 5/5 B/L upper and lower extremities; DTRs 2+ intact and symmetric, sensory intact  CHEST/LUNG: B/L good air entry; No rales, rhonchi, or wheezing  HEART: S1S2 normal, no S3, Regular rate and rhythm; No murmurs, rubs, or gallops  ABDOMEN: Soft, Nontender, Nondistended; Bowel sounds present  EXTREMITIES:  2+ Peripheral Pulses, No clubbing, cyanosis, or edema  LYMPH: No lymphadenopathy noted  SKIN: No rashes or lesions    Care Discussed with Consultants/Other Providers [ x] YES  [ ] NO

## 2017-11-16 NOTE — PROGRESS NOTE ADULT - ATTENDING COMMENTS
Agree with above assessment and plan as outlined above.    - Cont medical management    Darin Dailey MD, FACC  [facility], [facility]  [address] [place] [address]  PHONE:  [phone]  BEEPER : [phone]

## 2017-11-16 NOTE — PROGRESS NOTE ADULT - PROBLEM SELECTOR PLAN 7
c/w Eliquis  c/w lopressor  Digoxin held secondary to high level which is downtrending; also HR controlled with lopressor; will DC digoxin c/w Eliquis  c/w lopressor  Digoxin held secondary to high level which is downtrending; also HR controlled with lopressor; will DC digoxin as HR in 50's

## 2017-11-16 NOTE — PROGRESS NOTE ADULT - ASSESSMENT
89 yr old male, from NH, H/O A FIB/CHF/COPD/HLD/CAD/dementia, admit hospital for acute respirotary distress/resp failure/shock, admit ICU, S/P BIPAP, IV ABS, elevated BNP/troponin, on heparin drip,cardiology F/U, IV ABS, ICU care, monitor labs. elevated BUN/Cr, IV hydration, monitor labs. patient improved, change  lasix po, D/C NH F/U cardiology out patient

## 2017-11-16 NOTE — DISCHARGE NOTE ADULT - CARE PROVIDER_API CALL
Jamarcus Li), Internal Medicine  [address] [place] [address]  Phone: [phone]  Fax: [phone] Jamarcus Li), Internal Medicine  [address] [place] [address]  Phone: [phone]  Fax: [phone]    Maria Victoria Appiah  Phone: [phone]  Fax: (   )    -

## 2017-11-16 NOTE — PROGRESS NOTE ADULT - SUBJECTIVE AND OBJECTIVE BOX
Subjective:  pt seen and examined, no complaints on exam.   pt ROS -, NAD on exam     apixaban 2.5 milliGRAM(s) Oral every 12 hours  aspirin  chewable 81 milliGRAM(s) Oral daily  atorvastatin 80 milliGRAM(s) Oral at bedtime  furosemide   Injectable 40 milliGRAM(s) IV Push daily  levothyroxine 50 MICROGram(s) Oral daily  metoprolol     tartrate 12.5 milliGRAM(s) Oral two times a day  pantoprazole    Tablet 40 milliGRAM(s) Oral before breakfast  predniSONE   Tablet 30 milliGRAM(s) Oral daily                            11.3   9.3   )-----------( 189      ( 15 Nov 2017 06:50 )             33.4       Hemoglobin: 11.3 g/dL (11-15 @ 06:50)  Hemoglobin: 11.1 g/dL (11-14 @ 05:58)  Hemoglobin: 11.6 g/dL (11-14 @ 01:25)  Hemoglobin: 13.6 g/dL (11-13 @ 06:18)  Hemoglobin: 12.7 g/dL (11-12 @ 13:09)      11-15    140  |  101  |  54<H>  ----------------------------<  146<H>  3.9   |  33<H>  |  1.43<H>    Ca    8.8      15 Nov 2017 13:01      Creatinine Trend: 1.43<--, 1.61<--, 1.16<--, 1.23<--    COAGS:     CARDIAC MARKERS ( 15 Nov 2017 13:01 )  1.560 ng/mL / x     / x     / x     / x      CARDIAC MARKERS ( 14 Nov 2017 08:38 )  1.810 ng/mL / x     / 94 U/L / x     / 12.1 ng/mL  CARDIAC MARKERS ( 14 Nov 2017 01:25 )  0.820 ng/mL / x     / 78 U/L / x     / 7.5 ng/mL        T(C): 36.3 (11-15-17 @ 20:42), Max: 36.6 (11-15-17 @ 13:11)  HR: 58 (11-15-17 @ 20:42) (53 - 72)  BP: 136/46 (11-15-17 @ 20:42) (120/54 - 141/59)  RR: 16 (11-15-17 @ 20:42) (16 - 16)  SpO2: 98% (11-15-17 @ 20:42) (97% - 98%)  Wt(kg): --    I&O's Summary    14 Nov 2017 07:01  -  15 Nov 2017 07:00  --------------------------------------------------------  IN: 300 mL / OUT: 100 mL / NET: 200 mL      Appearance: Normal	  HEENT:   Normal oral mucosa, PERRL, EOMI	  Lymphatic: No lymphadenopathy , +  edema  Cardiovascular: irreg  S1 S2, No JVD, No murmurs , Peripheral pulses palpable 2+ bilaterally  Respiratory: Lungs clear to auscultation, normal effort 	  Gastrointestinal:  Soft, Non-tender, + BS	      TELEMETRY: 	 off     DIAGNOSTIC TESTING:  [ ] Echocardiogram:  < from: Transthoracic Echocardiogram (11.12.17 @ 07:37) >  CONCLUSIONS:  1. Densly calcified mitral valve leaflet, mitral annulus  calcification. Mild-moderate mitral regurgitation.  2. Calcified trileaflet aortic valve with normal opening.  Mild aortic regurgitation.  3. Aortic Root: 3.3 cm.  4. Mild left atrial enlargement.  5. Moderate concentric left ventricular hypertrophy.  6. Normal Left Ventricular Systolic Function,  (EF = 55 to  60%)  7. Grade II diastolic dysfunction.  8. Normal right atrium.  9. Right ventricle not well visualized.  10. RA Pressure is 10 mm Hg.  11. RV systolic pressure is 39 mm Hg.  12. There is mild tricuspid regurgitation.  13. Small pericardial effusion. No echocardiographic  evidence of pericardial tamponade.  14. Bilateral pleural effusions.    < end of copied text >    [ ]  Catheterization:  [ ] Stress Test:     OTHER: 	        ASSESSMENT/PLAN: 	89y Female hx of Afib, chol, htn, cad, chf, dementia , now resp distress, NSTEMI, chf excab      asa, statin , BB   cont eliquis at present  for Afib  diuresis with IV lasix  check daily weights   GI / DVT prophylaxis.   keep K>4, mag >2.0   trend creatine, could cont daily ACE   pulm fiollow up cont Steroid taper   D/W Dr Dailey

## 2017-11-16 NOTE — PROGRESS NOTE ADULT - SUBJECTIVE AND OBJECTIVE BOX
89y Female    Meds:    Allergies    penicillin (Unknown)    Intolerances        VITALS:  Vital Signs Last 24 Hrs  T(C): 36.3 (16 Nov 2017 13:09), Max: 36.4 (16 Nov 2017 05:00)  T(F): 97.3 (16 Nov 2017 13:09), Max: 97.6 (16 Nov 2017 05:00)  HR: 54 (16 Nov 2017 13:09) (54 - 59)  BP: 133/45 (16 Nov 2017 13:09) (133/45 - 148/57)  BP(mean): --  RR: 18 (16 Nov 2017 13:09) (16 - 18)  SpO2: 96% (16 Nov 2017 15:19) (94% - 100%)    LABS/DIAGNOSTIC TESTS:                          12.1   10.9  )-----------( 220      ( 16 Nov 2017 07:24 )             37.3         11-16    139  |  100  |  49<H>  ----------------------------<  152<H>  3.9   |  33<H>  |  1.20    Ca    8.9      16 Nov 2017 12:30            CULTURES: .Blood Blood-Peripheral  11-12 @ 12:13   No growth to date.  --  --      .Urine Clean Catch (Midstream)  11-12 @ 12:03   No growth  --  --            RADIOLOGY:      ROS:  [  ] UNABLE TO ELICIT 89y Female who is now on the medical floor, she is confused but looks well otherwise and has no active complaints. she has no fevers and her wbc count is almost normal despite being off abxs for 2 days.    Meds:    Allergies    penicillin (Unknown)    Intolerances        VITALS:  Vital Signs Last 24 Hrs  T(C): 36.3 (16 Nov 2017 13:09), Max: 36.4 (16 Nov 2017 05:00)  T(F): 97.3 (16 Nov 2017 13:09), Max: 97.6 (16 Nov 2017 05:00)  HR: 54 (16 Nov 2017 13:09) (54 - 59)  BP: 133/45 (16 Nov 2017 13:09) (133/45 - 148/57)  BP(mean): --  RR: 18 (16 Nov 2017 13:09) (16 - 18)  SpO2: 96% (16 Nov 2017 15:19) (94% - 100%)    LABS/DIAGNOSTIC TESTS:                          12.1   10.9  )-----------( 220      ( 16 Nov 2017 07:24 )             37.3         11-16    139  |  100  |  49<H>  ----------------------------<  152<H>  3.9   |  33<H>  |  1.20    Ca    8.9      16 Nov 2017 12:30            CULTURES: .Blood Blood-Peripheral  11-12 @ 12:13   No growth to date.  --  --      .Urine Clean Catch (Midstream)  11-12 @ 12:03   No growth  --  --            RADIOLOGY:      ROS:  [  ] UNABLE TO ELICIT

## 2017-11-16 NOTE — PROVIDER CONTACT NOTE (CRITICAL VALUE NOTIFICATION) - ACTION/TREATMENT ORDERED:
repeat ptt not needed,heparin is being d/c
Continue holding administration of digoxin tablet.
Continue to hold Digoxin PO medication.

## 2017-11-16 NOTE — DISCHARGE NOTE ADULT - PATIENT PORTAL LINK FT
“You can access the [employer] Patient Portal, offered by [facility], by registering with the following website: [email or web address]”

## 2017-11-16 NOTE — PROGRESS NOTE ADULT - SUBJECTIVE AND OBJECTIVE BOX
Pt seen and examined at bedside  No acute events overnight. Pt without complaints. Denies SOB.     Allergies:  penicillin (Unknown)    Hospital Medications:   MEDICATIONS  (STANDING):  apixaban 2.5 milliGRAM(s) Oral every 12 hours  aspirin  chewable 81 milliGRAM(s) Oral daily  atorvastatin 80 milliGRAM(s) Oral at bedtime  furosemide   Injectable 40 milliGRAM(s) IV Push daily  levothyroxine 50 MICROGram(s) Oral daily  metoprolol     tartrate 12.5 milliGRAM(s) Oral two times a day  pantoprazole    Tablet 40 milliGRAM(s) Oral before breakfast  predniSONE   Tablet 30 milliGRAM(s) Oral daily    VITALS:  T(F): 97.3 (17 @ 09:50), Max: 97.8 (11-15-17 @ 13:11)  HR: 55 (17 @ 09:50)  BP: 148/57 (17 @ 09:50)  RR: 17 (17 @ 09:50)  SpO2: 96% (17 @ 09:50)  Wt(kg): --      PHYSICAL EXAM:  Constitutional: NAD  HEENT: anicteric sclera, oropharynx clear, MMM  Neck: No JVD  Respiratory: CTAB, no wheezes, rales or rhonchi  Cardiovascular: S1, S2, RRR  Gastrointestinal: BS+, soft, NT/ND  Extremities: No cyanosis or clubbing. No peripheral edema  Neurological: A/O x 1, no focal deficits  Psychiatric: Normal mood, normal affect  : No CVA tenderness. No quigley.   Skin: No rashes    LABS:      139  |  100  |  49<H>  ----------------------------<  152<H>  3.9   |  33<H>  |  1.20    Ca    8.9      2017 12:30      Creatinine Trend: 1.20 <--, 1.43 <--, 1.61 <--, 1.16 <--, 1.23 <--                        12.1   10.9  )-----------( 220      ( 2017 07:24 )             37.3     Urine Studies:  Urinalysis Basic - ( 2017 08:14 )    Color: Yellow / Appearance: Clear / S.010 / pH:   Gluc:  / Ketone: Negative  / Bili: Negative / Urobili: Negative   Blood:  / Protein: Negative / Nitrite: Negative   Leuk Esterase: Negative / RBC: 0-2 /HPF / WBC 0-2 /HPF   Sq Epi:  / Non Sq Epi: Occasional /HPF / Bacteria: Many /HPF        RADIOLOGY & ADDITIONAL STUDIES:

## 2017-11-16 NOTE — PROGRESS NOTE ADULT - ATTENDING COMMENTS
89 female with hx of CHF, COPD, Afib, presents with acute resp failure requiring bipap, likely due to CHF exacerbation/pulmonary edema, also with NSTEMI.  Completed abx for pneumonia. Downgraded to SCU 11/14/17. DC planning       Problem/Plan - 1:  ·  Problem: Acute respiratory failure with hypoxia and hypercapnia.  Plan: Admitted to ICU for new bipap  now saturating well on room air no longer requiring bipap   taper steroids.      Problem/Plan - 2:  ·  Problem: NSTEMI (non-ST elevated myocardial infarction).  Plan: C/W asa, Lipitor, lopressor.      Problem/Plan - 3:  ·  Problem: CHF exacerbation.  Plan: will change lasix to po   daily weights  I's and O's.      Problem/Plan - 4:  ·  Problem: COPD (chronic obstructive pulmonary disease).  Plan: supplemental 02 as needed  prednisone  taper.      Problem/Plan - 5:  ·  Problem: Hypothyroidism.  Plan: c/w synthroid.      Problem/Plan - 6:  Problem: Dementia. Plan: c/w supportive care.     Problem/Plan - 7:  ·  Problem: Atrial fibrillation.  Plan: c/w Eliquis  c/w lopressor  Digoxin held secondary to high level which is downtrending; also HR controlled with lopressor; will DC digoxin as HR in 50's.     Problem/Plan - 8:  ·  Problem: MOE (acute kidney injury).  Plan: MOE in the setting of CHF exac and NSTEMI  Monitor BMP, renal function improved   Sr. Cr. normal today   avoid nephrotoxic meds.

## 2017-11-16 NOTE — PROGRESS NOTE ADULT - ASSESSMENT
leukocytosis -resolved  pneumonia - unlikely and appears it was all chf    plan - off all abxs  reconsult prn

## 2017-11-16 NOTE — DISCHARGE NOTE ADULT - MEDICATION SUMMARY - MEDICATIONS TO TAKE
I will START or STAY ON the medications listed below when I get home from the hospital:    Raised Commode seat  -- CHF  COPD  -- Indication: For CHF exacerbation    aspirin 81 mg oral tablet  -- 1 tab(s) by mouth once a day  -- Indication: For CHF exacerbation    apixaban 2.5 mg oral tablet  -- 1 tab(s) by mouth 2 times a day  -- Indication: For Atrial fibrillation    simvastatin 80 mg oral tablet  -- 1 tab(s) by mouth once a day (at bedtime)  -- Indication: For HLD (hyperlipidemia)    metoprolol  -- 12.5 milligram(s) by mouth 2 times a day  -- Indication: For Atrial fibrillation    furosemide 40 mg oral tablet  -- 1 tab(s) by mouth once a day  -- Indication: For CHF exacerbation    senna oral tablet  -- 2 tab(s) by mouth once a day (at bedtime)  -- Indication: For COnstipation    docusate sodium 100 mg oral capsule  -- 1 cap(s) by mouth 2 times a day  -- Indication: For COnstipation    Flonase 50 mcg/inh nasal spray  -- 1 spray(s) into nose once a day  -- Indication: For Nasal    pantoprazole 40 mg oral delayed release tablet  -- 1 tab(s) by mouth once a day (before a meal)  -- Indication: For GERD    Synthroid 50 mcg (0.05 mg) oral tablet  -- 1 tab(s) by mouth once a day  -- Indication: For Hypothyroidism    multivitamin  -- Indication: For supplement    Vitamin C 250 mg oral tablet  -- 1 tab(s) by mouth once a day  -- Indication: For supplement I will START or STAY ON the medications listed below when I get home from the hospital:    Raised Commode seat  -- CHF  COPD  -- Indication: For CHF exacerbation    aspirin 81 mg oral tablet  -- 1 tab(s) by mouth once a day  -- Indication: For CHF exacerbation    apixaban 5 mg oral tablet  -- 1 tab(s) by mouth 2 times a day   -- Check with your doctor before becoming pregnant.  It is very important that you take or use this exactly as directed.  Do not skip doses or discontinue unless directed by your doctor.  Obtain medical advice before taking any non-prescription drugs as some may affect the action of this medication.    -- Indication: For Atrial fibrillation    simvastatin 80 mg oral tablet  -- 1 tab(s) by mouth once a day (at bedtime)  -- Indication: For HLD (hyperlipidemia)    metoprolol tartrate 25 mg oral tablet  -- 0.5 tab(s) by mouth 2 times a day   -- It is very important that you take or use this exactly as directed.  Do not skip doses or discontinue unless directed by your doctor.  May cause drowsiness.  Alcohol may intensify this effect.  Use care when operating dangerous machinery.  Some non-prescription drugs may aggravate your condition.  Read all labels carefully.  If a warning appears, check with your doctor before taking.  Take with food or milk.  This drug may impair the ability to drive or operate machinery.  Use care until you become familiar with its effects.    -- Indication: For Atrial fibrillation    furosemide 40 mg oral tablet  -- 1 tab(s) by mouth once a day  -- Indication: For CHF exacerbation    furosemide 40 mg oral tablet  -- 1 tab(s) by mouth once a day  -- Indication: For CHF exacerbation    senna oral tablet  -- 2 tab(s) by mouth once a day (at bedtime)  -- Indication: For COnstipation    docusate sodium 100 mg oral capsule  -- 1 cap(s) by mouth 2 times a day  -- Indication: For COnstipation    Flonase 50 mcg/inh nasal spray  -- 1 spray(s) into nose once a day  -- Indication: For Nasal    pantoprazole 40 mg oral delayed release tablet  -- 1 tab(s) by mouth once a day (before a meal)  -- Indication: For GERD    multivitamin  -- Indication: For supplement    Vitamin C 250 mg oral tablet  -- 1 tab(s) by mouth once a day  -- Indication: For supplement I will START or STAY ON the medications listed below when I get home from the hospital:    Raised Commode seat  -- CHF  COPD  -- Indication: For CHF exacerbation    aspirin 81 mg oral tablet  -- 1 tab(s) by mouth once a day  -- Indication: For CHF exacerbation    apixaban 2.5 mg oral tablet  -- 1 tab(s) by mouth 2 times a day   -- Check with your doctor before becoming pregnant.  It is very important that you take or use this exactly as directed.  Do not skip doses or discontinue unless directed by your doctor.  Obtain medical advice before taking any non-prescription drugs as some may affect the action of this medication.    -- Indication: For Atrial fibrillation    simvastatin 80 mg oral tablet  -- 1 tab(s) by mouth once a day (at bedtime)  -- Indication: For HLD (hyperlipidemia)    metoprolol tartrate 25 mg oral tablet  -- 0.5 tab(s) by mouth 2 times a day   -- It is very important that you take or use this exactly as directed.  Do not skip doses or discontinue unless directed by your doctor.  May cause drowsiness.  Alcohol may intensify this effect.  Use care when operating dangerous machinery.  Some non-prescription drugs may aggravate your condition.  Read all labels carefully.  If a warning appears, check with your doctor before taking.  Take with food or milk.  This drug may impair the ability to drive or operate machinery.  Use care until you become familiar with its effects.    -- Indication: For Atrial fibrillation    furosemide 40 mg oral tablet  -- 1 tab(s) by mouth once a day  -- Indication: For CHF exacerbation    furosemide 40 mg oral tablet  -- 1 tab(s) by mouth once a day  -- Indication: For CHF exacerbation    senna oral tablet  -- 2 tab(s) by mouth once a day (at bedtime)  -- Indication: For COnstipation    docusate sodium 100 mg oral capsule  -- 1 cap(s) by mouth 2 times a day  -- Indication: For COnstipation    Flonase 50 mcg/inh nasal spray  -- 1 spray(s) into nose once a day  -- Indication: For Nasal    pantoprazole 40 mg oral delayed release tablet  -- 1 tab(s) by mouth once a day (before a meal)  -- Indication: For GERD    multivitamin  -- Indication: For supplement    Vitamin C 250 mg oral tablet  -- 1 tab(s) by mouth once a day  -- Indication: For supplement I will START or STAY ON the medications listed below when I get home from the hospital:    Raised Commode seat  -- CHF  COPD  -- Indication: For CHF exacerbation    aspirin 81 mg oral tablet  -- 1 tab(s) by mouth once a day  -- Indication: For CHF exacerbation    apixaban 2.5 mg oral tablet  -- 1 tab(s) by mouth 2 times a day   -- Check with your doctor before becoming pregnant.  It is very important that you take or use this exactly as directed.  Do not skip doses or discontinue unless directed by your doctor.  Obtain medical advice before taking any non-prescription drugs as some may affect the action of this medication.    -- Indication: For Atrial fibrillation    simvastatin 80 mg oral tablet  -- 1 tab(s) by mouth once a day (at bedtime)  -- Indication: For HLD (hyperlipidemia)    metoprolol tartrate 25 mg oral tablet  -- 0.5 tab(s) by mouth 2 times a day   -- It is very important that you take or use this exactly as directed.  Do not skip doses or discontinue unless directed by your doctor.  May cause drowsiness.  Alcohol may intensify this effect.  Use care when operating dangerous machinery.  Some non-prescription drugs may aggravate your condition.  Read all labels carefully.  If a warning appears, check with your doctor before taking.  Take with food or milk.  This drug may impair the ability to drive or operate machinery.  Use care until you become familiar with its effects.    -- Indication: For Atrial fibrillation    furosemide 40 mg oral tablet  -- 1 tab(s) by mouth once a day  -- Indication: For CHF exacerbation    senna oral tablet  -- 2 tab(s) by mouth once a day (at bedtime)  -- Indication: For COnstipation    docusate sodium 100 mg oral capsule  -- 1 cap(s) by mouth 2 times a day  -- Indication: For COnstipation    Flonase 50 mcg/inh nasal spray  -- 1 spray(s) into nose once a day  -- Indication: For Nasal    pantoprazole 40 mg oral delayed release tablet  -- 1 tab(s) by mouth once a day (before a meal)  -- Indication: For GERD    multivitamin  -- Indication: For supplement    Vitamin C 250 mg oral tablet  -- 1 tab(s) by mouth once a day  -- Indication: For supplement I will START or STAY ON the medications listed below when I get home from the hospital:    Raised Commode seat  -- CHF  COPD  -- Indication: For CHF exacerbation    aspirin 81 mg oral tablet  -- 1 tab(s) by mouth once a day  -- Indication: For Prophylactic measure    apixaban 2.5 mg oral tablet  -- 1 tab(s) by mouth 2 times a day   -- Check with your doctor before becoming pregnant.  It is very important that you take or use this exactly as directed.  Do not skip doses or discontinue unless directed by your doctor.  Obtain medical advice before taking any non-prescription drugs as some may affect the action of this medication.    -- Indication: For Atrial fibrillation    simvastatin 80 mg oral tablet  -- 1 tab(s) by mouth once a day (at bedtime)  -- Indication: For Hyperlipidemia    metoprolol tartrate 25 mg oral tablet  -- 0.5 tab(s) by mouth 2 times a day   -- It is very important that you take or use this exactly as directed.  Do not skip doses or discontinue unless directed by your doctor.  May cause drowsiness.  Alcohol may intensify this effect.  Use care when operating dangerous machinery.  Some non-prescription drugs may aggravate your condition.  Read all labels carefully.  If a warning appears, check with your doctor before taking.  Take with food or milk.  This drug may impair the ability to drive or operate machinery.  Use care until you become familiar with its effects.    -- Indication: For CHF exacerbation    furosemide 40 mg oral tablet  -- 1 tab(s) by mouth once a day  -- Indication: For CHF exacerbation    furosemide 40 mg oral tablet  -- 1 tab(s) by mouth once a day  -- Indication: For CHF exacerbation    docusate sodium 100 mg oral capsule  -- 1 cap(s) by mouth 2 times a day  -- Indication: For Constipation    senna oral tablet  -- 2 tab(s) by mouth once a day (at bedtime)  -- Indication: For Constipation    Flonase 50 mcg/inh nasal spray  -- 1 spray(s) into nose once a day  -- Indication: For Allergies    pantoprazole 40 mg oral delayed release tablet  -- 1 tab(s) by mouth once a day (before a meal)  -- Indication: For GERD    multivitamin  -- Indication: For Prophylactic measure    Vitamin C 250 mg oral tablet  -- 1 tab(s) by mouth once a day  -- Indication: For Prophylactic measure

## 2017-11-16 NOTE — PROGRESS NOTE ADULT - PROBLEM SELECTOR PLAN 1
Suspect MOE from cardiorenal syndrome v ATN.   Electrolytes acceptable. Downtrending ca.   Much improved volume status and respiratory symptoms. On room air.   Can switch to PO lasix 40mg qDaily. Maintain I<O.  High dig levels noted, lower dose to every other day, and repeat levels.   ACE/ARB on hold due to MOE.

## 2017-11-16 NOTE — PROGRESS NOTE ADULT - PROBLEM SELECTOR PLAN 8
MOE in the setting of CHF exac and NSTEMI  Monitor BMP, renal function improving, Sr. Cr. trending down  avoid nephrotoxic meds. MOE in the setting of CHF exac and NSTEMI  Monitor BMP, renal function improved   Sr. Cr. normal today   avoid nephrotoxic meds.

## 2017-11-16 NOTE — DISCHARGE NOTE ADULT - PROVIDER TOKENS
TOKHAILEY:'[id number]' TOKEN:'[id number]',FREE:[LAST:[Bijal],FIRST:[Maria Victoria],PHONE:[phone]],FAX:[(   )    -]]

## 2017-11-16 NOTE — DISCHARGE NOTE ADULT - MEDICATION SUMMARY - MEDICATIONS TO CHANGE
I will SWITCH the dose or number of times a day I take the medications listed below when I get home from the hospital:    Lasix 20 mg oral tablet  -- 1 tab(s) by mouth once a day    Lopressor 50 mg oral tablet  -- 1 tab(s) by mouth 2 times a day

## 2017-11-16 NOTE — PROGRESS NOTE ADULT - PROBLEM SELECTOR PLAN 1
Admitted to ICU for new bipap  now saturating well on nasal cannula no longer requiring bipap   taper steroids. Admitted to ICU for new bipap  now saturating well on room air no longer requiring bipap   taper steroids.

## 2017-11-16 NOTE — PROGRESS NOTE ADULT - SUBJECTIVE AND OBJECTIVE BOX
DNR [x ]   DNI  [ x ]    INTERVAL HPI/OVERNIGHT EVENTS: No acute events overnight    PRESSORS: [ ] YES [ x] NO  WHICH:        Cardiovascular:  Moderate concentric left ventricular hypertrophy. Normal Left Ventricular Systolic Function,  (EF = 55 to  60%) Grade II diastolic dysfunction.    Furosemide   Injectable 40 milliGRAM(s) IV Push daily  metoprolol     tartrate 12.5 milliGRAM(s) Oral two times a day    Pulmonary:    Hematalogic:  apixaban 2.5 milliGRAM(s) Oral every 12 hours  aspirin  chewable 81 milliGRAM(s) Oral daily    Other:  acetaminophen   Tablet. 650 milliGRAM(s) Oral every 6 hours PRN  atorvastatin 80 milliGRAM(s) Oral at bedtime  levothyroxine 50 MICROGram(s) Oral daily  pantoprazole    Tablet 40 milliGRAM(s) Oral before breakfast  predniSONE   Tablet 30 milliGRAM(s) Oral daily    acetaminophen   Tablet. 650 milliGRAM(s) Oral every 6 hours PRN  apixaban 2.5 milliGRAM(s) Oral every 12 hours  aspirin  chewable 81 milliGRAM(s) Oral daily  atorvastatin 80 milliGRAM(s) Oral at bedtime  furosemide   Injectable 40 milliGRAM(s) IV Push daily  levothyroxine 50 MICROGram(s) Oral daily  metoprolol     tartrate 12.5 milliGRAM(s) Oral two times a day  pantoprazole    Tablet 40 milliGRAM(s) Oral before breakfast  predniSONE   Tablet 30 milliGRAM(s) Oral daily    Drug Dosing Weight    Weight (kg): 70.3 (12 Nov 2017 04:29)    CENTRAL LINE: [ ] YES [x ] NO  LOCATION:   DATE INSERTED:  REMOVE: [ ] YES [ ] NO  EXPLAIN:    FLAHERTY: [ ] YES [ x] NO    DATE INSERTED:  REMOVE:  [ ] YES [ ] NO  EXPLAIN:    A-LINE:  [ ] YES [x ] NO  LOCATION:   DATE INSERTED:  REMOVE:  [ ] YES [ ] NO  EXPLAIN:    PAST MEDICAL & SURGICAL HISTORY:  Hypothyroidism  HLD (hyperlipidemia)  Osteoporosis  Dementia  HTN (hypertension)  Heart failure  NSTEMI (non-ST elevated myocardial infarction)  Atrial fibrillation  COPD (chronic obstructive pulmonary disease)  No significant past surgical history    PHYSICAL EXAM:    GENERAL: OOB to chair in NAD,   HEAD:  Atraumatic, Normocephalic  EYES: EOMI, PERRLA, conjunctiva and sclera clear  ENMT: No tonsillar erythema, exudates, or enlargement;  NECK: Supple, No JVD, Normal thyroid  NERVOUS SYSTEM:  Alert, awake, responds appropriately with some periods of confusion noted   CHEST/LUNG: Clear to percussion bilaterally; No rales, rhonchi, wheezing, or rubs  HEART: Regular rate and rhythm; No murmurs, rubs, or gallops  ABDOMEN: Soft, Nontender, Nondistended; Bowel sounds present  EXTREMITIES:  2+ Peripheral Pulses, No clubbing, cyanosis, or edema  LYMPH: No lymphadenopathy noted  SKIN: No rashes or lesions      LABS:  CBC Full  -  ( 16 Nov 2017 07:24 )  WBC Count : 10.9 K/uL  Hemoglobin : 12.1 g/dL  Hematocrit : 37.3 %  Platelet Count - Automated : 220 K/uL  Mean Cell Volume : 96.2 fl  Mean Cell Hemoglobin : 31.2 pg  Mean Cell Hemoglobin Concentration : 32.4 gm/dL  Auto Neutrophil # : x  Auto Lymphocyte # : x  Auto Monocyte # : x  Auto Eosinophil # : x  Auto Basophil # : x  Auto Neutrophil % : x  Auto Lymphocyte % : x  Auto Monocyte % : x  Auto Eosinophil % : x  Auto Basophil % : x    11-15    140  |  101  |  54<H>  ----------------------------<  146<H>  3.9   |  33<H>  |  1.43<H>    Ca    8.8      15 Nov 2017 13:01                [x  ]  DVT Prophylaxis - Full dose  AC  [ x ]  Nutrition, Brand, Rate         Goal Rate         Abdominal Nutritional Status -  Malnutrition   Cachexia      Morbid Obesity BMI >/=40    RADIOLOGY & ADDITIONAL STUDIES:  ***    [  ] Goals of Care Discussion with Family/Proxy/Other           Elements of Conversation Discussed: Patient/Family understanding of current illness   Advanced Directives                                                                       Prognosis  Treatment Options  Care Aligned with patient's wishes                                             TIME SPENT: 35 minutes DNR [x ]   DNI  [ x ]    INTERVAL HPI/OVERNIGHT EVENTS: No acute events overnight; looks comfortable    PRESSORS: [ ] YES [ x] NO  WHICH:        Cardiovascular:  Moderate concentric left ventricular hypertrophy. Normal Left Ventricular Systolic Function,  (EF = 55 to  60%) Grade II diastolic dysfunction.    Furosemide   Injectable 40 milliGRAM(s) IV Push daily  metoprolol     tartrate 12.5 milliGRAM(s) Oral two times a day    Pulmonary:    Hematalogic:  apixaban 2.5 milliGRAM(s) Oral every 12 hours  aspirin  chewable 81 milliGRAM(s) Oral daily    Other:  acetaminophen   Tablet. 650 milliGRAM(s) Oral every 6 hours PRN  atorvastatin 80 milliGRAM(s) Oral at bedtime  levothyroxine 50 MICROGram(s) Oral daily  pantoprazole    Tablet 40 milliGRAM(s) Oral before breakfast  predniSONE   Tablet 30 milliGRAM(s) Oral daily    acetaminophen   Tablet. 650 milliGRAM(s) Oral every 6 hours PRN  apixaban 2.5 milliGRAM(s) Oral every 12 hours  aspirin  chewable 81 milliGRAM(s) Oral daily  atorvastatin 80 milliGRAM(s) Oral at bedtime  furosemide   Injectable 40 milliGRAM(s) IV Push daily  levothyroxine 50 MICROGram(s) Oral daily  metoprolol     tartrate 12.5 milliGRAM(s) Oral two times a day  pantoprazole    Tablet 40 milliGRAM(s) Oral before breakfast  predniSONE   Tablet 30 milliGRAM(s) Oral daily    Drug Dosing Weight    Weight (kg): 70.3 (12 Nov 2017 04:29)    CENTRAL LINE: [ ] YES [x ] NO  LOCATION:   DATE INSERTED:  REMOVE: [ ] YES [ ] NO  EXPLAIN:    FLAHERTY: [ ] YES [ x] NO    DATE INSERTED:  REMOVE:  [ ] YES [ ] NO  EXPLAIN:    A-LINE:  [ ] YES [x ] NO  LOCATION:   DATE INSERTED:  REMOVE:  [ ] YES [ ] NO  EXPLAIN:    PAST MEDICAL & SURGICAL HISTORY:  Hypothyroidism  HLD (hyperlipidemia)  Osteoporosis  Dementia  HTN (hypertension)  Heart failure  NSTEMI (non-ST elevated myocardial infarction)  Atrial fibrillation  COPD (chronic obstructive pulmonary disease)  No significant past surgical history    PHYSICAL EXAM:    GENERAL: OOB to chair in NAD,   HEAD:  Atraumatic, Normocephalic  EYES: EOMI, PERRLA, conjunctiva and sclera clear  ENMT: No tonsillar erythema, exudates, or enlargement;  NECK: Supple, No JVD, Normal thyroid  NERVOUS SYSTEM:  Alert, awake, responds appropriately with some periods of confusion noted   CHEST/LUNG: Clear to percussion bilaterally; No rales, rhonchi, wheezing, or rubs  HEART: Regular rate and rhythm; No murmurs, rubs, or gallops  ABDOMEN: Soft, Nontender, Nondistended; Bowel sounds present  EXTREMITIES:  2+ Peripheral Pulses, No clubbing, cyanosis, or edema  LYMPH: No lymphadenopathy noted  SKIN: No rashes or lesions      LABS:  CBC Full  -  ( 16 Nov 2017 07:24 )  WBC Count : 10.9 K/uL  Hemoglobin : 12.1 g/dL  Hematocrit : 37.3 %  Platelet Count - Automated : 220 K/uL  Mean Cell Volume : 96.2 fl  Mean Cell Hemoglobin : 31.2 pg  Mean Cell Hemoglobin Concentration : 32.4 gm/dL  Auto Neutrophil # : x  Auto Lymphocyte # : x  Auto Monocyte # : x  Auto Eosinophil # : x  Auto Basophil # : x  Auto Neutrophil % : x  Auto Lymphocyte % : x  Auto Monocyte % : x  Auto Eosinophil % : x  Auto Basophil % : x    11-15    140  |  101  |  54<H>  ----------------------------<  146<H>  3.9   |  33<H>  |  1.43<H>    Ca    8.8      15 Nov 2017 13:01                [x  ]  DVT Prophylaxis - Full dose  AC  [ x ]  Nutrition, Brand, Rate         Goal Rate         Abdominal Nutritional Status -  Malnutrition   Cachexia      Morbid Obesity BMI >/=40    RADIOLOGY & ADDITIONAL STUDIES:  ***    [x  ] Goals of Care Discussion with Family/Proxy/Other           Elements of Conversation Discussed: Patient/Family understanding of current illness   Advanced Directives                                                                       Prognosis  Treatment Options  Care Aligned with patient's wishes                                             TIME SPENT: 35 minutes

## 2017-11-17 LAB
ANION GAP SERPL CALC-SCNC: 5 MMOL/L — SIGNIFICANT CHANGE UP (ref 5–17)
BUN SERPL-MCNC: 51 MG/DL — HIGH (ref 7–18)
CALCIUM SERPL-MCNC: 8.6 MG/DL — SIGNIFICANT CHANGE UP (ref 8.4–10.5)
CHLORIDE SERPL-SCNC: 101 MMOL/L — SIGNIFICANT CHANGE UP (ref 96–108)
CO2 SERPL-SCNC: 33 MMOL/L — HIGH (ref 22–31)
CREAT SERPL-MCNC: 1 MG/DL — SIGNIFICANT CHANGE UP (ref 0.5–1.3)
CULTURE RESULTS: SIGNIFICANT CHANGE UP
CULTURE RESULTS: SIGNIFICANT CHANGE UP
DIGOXIN SERPL-MCNC: 1.8 NG/ML — SIGNIFICANT CHANGE UP (ref 0.8–2)
GLUCOSE SERPL-MCNC: 99 MG/DL — SIGNIFICANT CHANGE UP (ref 70–99)
HCT VFR BLD CALC: 39.7 % — SIGNIFICANT CHANGE UP (ref 34.5–45)
HGB BLD-MCNC: 12.8 G/DL — SIGNIFICANT CHANGE UP (ref 11.5–15.5)
MCHC RBC-ENTMCNC: 31 PG — SIGNIFICANT CHANGE UP (ref 27–34)
MCHC RBC-ENTMCNC: 32.1 GM/DL — SIGNIFICANT CHANGE UP (ref 32–36)
MCV RBC AUTO: 96.6 FL — SIGNIFICANT CHANGE UP (ref 80–100)
PLATELET # BLD AUTO: 212 K/UL — SIGNIFICANT CHANGE UP (ref 150–400)
POTASSIUM SERPL-MCNC: 3.7 MMOL/L — SIGNIFICANT CHANGE UP (ref 3.5–5.3)
POTASSIUM SERPL-SCNC: 3.7 MMOL/L — SIGNIFICANT CHANGE UP (ref 3.5–5.3)
RBC # BLD: 4.11 M/UL — SIGNIFICANT CHANGE UP (ref 3.8–5.2)
RBC # FLD: 12.4 % — SIGNIFICANT CHANGE UP (ref 10.3–14.5)
SODIUM SERPL-SCNC: 139 MMOL/L — SIGNIFICANT CHANGE UP (ref 135–145)
SPECIMEN SOURCE: SIGNIFICANT CHANGE UP
SPECIMEN SOURCE: SIGNIFICANT CHANGE UP
WBC # BLD: 9.9 K/UL — SIGNIFICANT CHANGE UP (ref 3.8–10.5)
WBC # FLD AUTO: 9.9 K/UL — SIGNIFICANT CHANGE UP (ref 3.8–10.5)

## 2017-11-17 RX ORDER — SENNA PLUS 8.6 MG/1
2 TABLET ORAL AT BEDTIME
Qty: 0 | Refills: 0 | Status: DISCONTINUED | OUTPATIENT
Start: 2017-11-17 | End: 2017-11-22

## 2017-11-17 RX ORDER — DOCUSATE SODIUM 100 MG
100 CAPSULE ORAL
Qty: 0 | Refills: 0 | Status: DISCONTINUED | OUTPATIENT
Start: 2017-11-17 | End: 2017-11-22

## 2017-11-17 RX ADMIN — APIXABAN 2.5 MILLIGRAM(S): 2.5 TABLET, FILM COATED ORAL at 17:45

## 2017-11-17 RX ADMIN — Medication 100 MILLIGRAM(S): at 05:32

## 2017-11-17 RX ADMIN — Medication 12.5 MILLIGRAM(S): at 17:45

## 2017-11-17 RX ADMIN — Medication 650 MILLIGRAM(S): at 00:54

## 2017-11-17 RX ADMIN — Medication 81 MILLIGRAM(S): at 11:25

## 2017-11-17 RX ADMIN — APIXABAN 2.5 MILLIGRAM(S): 2.5 TABLET, FILM COATED ORAL at 05:31

## 2017-11-17 RX ADMIN — SENNA PLUS 2 TABLET(S): 8.6 TABLET ORAL at 22:01

## 2017-11-17 RX ADMIN — Medication 650 MILLIGRAM(S): at 04:20

## 2017-11-17 RX ADMIN — Medication 100 MILLIGRAM(S): at 17:45

## 2017-11-17 RX ADMIN — Medication 30 MILLIGRAM(S): at 05:31

## 2017-11-17 RX ADMIN — PANTOPRAZOLE SODIUM 40 MILLIGRAM(S): 20 TABLET, DELAYED RELEASE ORAL at 05:32

## 2017-11-17 RX ADMIN — Medication 40 MILLIGRAM(S): at 05:32

## 2017-11-17 RX ADMIN — Medication 50 MICROGRAM(S): at 05:31

## 2017-11-17 RX ADMIN — ATORVASTATIN CALCIUM 80 MILLIGRAM(S): 80 TABLET, FILM COATED ORAL at 22:01

## 2017-11-17 RX ADMIN — Medication 12.5 MILLIGRAM(S): at 05:35

## 2017-11-17 NOTE — PROGRESS NOTE ADULT - ATTENDING COMMENTS
Agree with above assessment and plan as outlined above.    - Cont medical management of CAD given advanced age and dementia  - cont eliquis for afib  - Steroid taper  - Doing well on PO jesus Dailey MD, FACC  [facility], [facility]  [address] [place] [address]  PHONE:  [phone]  BEEPER : [phone] Patient seen and examined, agree with above assessment and plan as transcribed above.      - Cont medical management of CAD given advanced age and dementia  - cont eliquis for afib  - Steroid taper  - Doing well on PO jesus Dailey MD, FACC  [facility], [facility]  [address] [place] [address]  PHONE:  [phone]  BEEPER : [phone]

## 2017-11-17 NOTE — PROGRESS NOTE ADULT - ATTENDING COMMENTS
89 female with hx of CHF, COPD, Afib, presents with acute resp failure requiring bipap, likely due to CHF exacerbation/pulmonary edema, also with NSTEMI.  Completed abx for pneumonia. Downgraded to SCU 11/14/17. DC planning       Problem/Plan - 1:  ·  Problem: Acute respiratory failure with hypoxia and hypercapnia.  Plan: Admitted to ICU for new bipap  now saturating well on room air no longer requiring bipap   taper steroids.      Problem/Plan - 2:  ·  Problem: NSTEMI (non-ST elevated myocardial infarction).  Plan: C/W asa, Lipitor, lopressor.      Problem/Plan - 3:  ·  Problem: CHF exacerbation.  Plan: continue with  lasix to po   daily weights  I's and O's.     Problem/Plan - 4:  ·  Problem: COPD (chronic obstructive pulmonary disease).  Plan: supplemental 02 as needed  prednisone  taper.      Problem/Plan - 5:  ·  Problem: Hypothyroidism.  Plan: c/w synthroid.      Problem/Plan - 6:  Problem: Dementia. Plan: c/w supportive care.     Problem/Plan - 7:  ·  Problem: Atrial fibrillation.  Plan: c/w Eliquis  c/w lopressor  Digoxin held secondary to high level which is downtrending; also HR controlled with lopressor; will DC digoxin as HR in 50's.      Problem/Plan - 8:  ·  Problem: MOE (acute kidney injury).  Plan: MOE in the setting of CHF exac and NSTEMI  Monitor BMP, renal function improved   Sr. Cr. normal today   avoid nephrotoxic meds.

## 2017-11-17 NOTE — PROGRESS NOTE ADULT - SUBJECTIVE AND OBJECTIVE BOX
Subjective:  pt seen and examined, no complaints on exam.   pt ROS -, resting well .     acetaminophen   Tablet. 650 milliGRAM(s) Oral every 6 hours PRN  apixaban 2.5 milliGRAM(s) Oral every 12 hours  aspirin  chewable 81 milliGRAM(s) Oral daily  atorvastatin 80 milliGRAM(s) Oral at bedtime  docusate sodium 100 milliGRAM(s) Oral two times a day  furosemide    Tablet 40 milliGRAM(s) Oral daily  levothyroxine 50 MICROGram(s) Oral daily  metoprolol     tartrate 12.5 milliGRAM(s) Oral two times a day  pantoprazole    Tablet 40 milliGRAM(s) Oral before breakfast  predniSONE   Tablet 30 milliGRAM(s) Oral daily  senna 2 Tablet(s) Oral at bedtime                            12.1   10.9  )-----------( 220      ( 16 Nov 2017 07:24 )             37.3       Hemoglobin: 12.1 g/dL (11-16 @ 07:24)  Hemoglobin: 11.3 g/dL (11-15 @ 06:50)  Hemoglobin: 11.1 g/dL (11-14 @ 05:58)  Hemoglobin: 11.6 g/dL (11-14 @ 01:25)  Hemoglobin: 13.6 g/dL (11-13 @ 06:18)      11-16    139  |  100  |  49<H>  ----------------------------<  152<H>  3.9   |  33<H>  |  1.20    Ca    8.9      16 Nov 2017 12:30      Creatinine Trend: 1.20<--, 1.43<--, 1.61<--, 1.16<--, 1.23<--    COAGS:     CARDIAC MARKERS ( 15 Nov 2017 13:01 )  1.560 ng/mL / x     / x     / x     / x      CARDIAC MARKERS ( 14 Nov 2017 08:38 )  1.810 ng/mL / x     / 94 U/L / x     / 12.1 ng/mL        T(C): 36.4 (11-16-17 @ 20:31), Max: 36.4 (11-16-17 @ 05:00)  HR: 60 (11-16-17 @ 20:31) (54 - 60)  BP: 150/49 (11-16-17 @ 20:31) (133/45 - 150/49)  RR: 14 (11-16-17 @ 20:31) (14 - 18)  SpO2: 95% (11-16-17 @ 20:31) (94% - 100%)  Wt(kg): --    I&O's Summary      Appearance: Normal	  HEENT:   Normal oral mucosa, PERRL, EOMI	  Lymphatic: No lymphadenopathy , +  edema  Cardiovascular: irreg  S1 S2, No JVD, No murmurs , Peripheral pulses palpable 2+ bilaterally  Respiratory: Lungs clear to auscultation, normal effort 	  Gastrointestinal:  Soft, Non-tender, + BS	      TELEMETRY: 	 off     DIAGNOSTIC TESTING:  [ ] Echocardiogram:  < from: Transthoracic Echocardiogram (11.12.17 @ 07:37) >  CONCLUSIONS:  1. Densly calcified mitral valve leaflet, mitral annulus  calcification. Mild-moderate mitral regurgitation.  2. Calcified trileaflet aortic valve with normal opening.  Mild aortic regurgitation.  3. Aortic Root: 3.3 cm.  4. Mild left atrial enlargement.  5. Moderate concentric left ventricular hypertrophy.  6. Normal Left Ventricular Systolic Function,  (EF = 55 to  60%)  7. Grade II diastolic dysfunction.  8. Normal right atrium.  9. Right ventricle not well visualized.  10. RA Pressure is 10 mm Hg.  11. RV systolic pressure is 39 mm Hg.  12. There is mild tricuspid regurgitation.  13. Small pericardial effusion. No echocardiographic  evidence of pericardial tamponade.  14. Bilateral pleural effusions.    < end of copied text >    [ ]  Catheterization:  [ ] Stress Test:     OTHER: 	        ASSESSMENT/PLAN: 	89y Female hx of Afib, chol, htn, cad, chf, dementia , now resp distress, NSTEMI, chf excab  asa, statin , BB - HR stable rate  cont eliquis at present  for Afib  diuresis with now PO lasix - keep net neg daily    GI / DVT prophylaxis.   keep K>4, mag >2.0   trend creatine- renal follow up    pulm fiollow up cont Steroid taper   D/W Dr Dailey

## 2017-11-17 NOTE — PROGRESS NOTE ADULT - SUBJECTIVE AND OBJECTIVE BOX
Patient is a 89y old  Female who presents with a chief complaint of shortness of breath (16 Nov 2017 14:40)      INTERVAL HPI/OVERNIGHT EVENTS:    MEDICATIONS  (STANDING):  apixaban 2.5 milliGRAM(s) Oral every 12 hours  aspirin  chewable 81 milliGRAM(s) Oral daily  atorvastatin 80 milliGRAM(s) Oral at bedtime  docusate sodium 100 milliGRAM(s) Oral two times a day  furosemide    Tablet 40 milliGRAM(s) Oral daily  levothyroxine 50 MICROGram(s) Oral daily  metoprolol     tartrate 12.5 milliGRAM(s) Oral two times a day  pantoprazole    Tablet 40 milliGRAM(s) Oral before breakfast  predniSONE   Tablet   Oral   senna 2 Tablet(s) Oral at bedtime    MEDICATIONS  (PRN):  acetaminophen   Tablet. 650 milliGRAM(s) Oral every 6 hours PRN Moderate Pain (4 - 6)      Allergies    penicillin (Unknown)    Intolerances        REVIEW OF SYSTEMS:  CONSTITUTIONAL: No fever, weight loss, or fatigue  EYES: No eye pain, visual disturbances, or discharge  ENMT:  No difficulty hearing, tinnitus, vertigo; No sinus or throat pain  NECK: No pain or stiffness  BREASTS: No pain, masses, or nipple discharge  RESPIRATORY: No cough, wheezing, chills or hemoptysis; No shortness of breath  CARDIOVASCULAR: No chest pain, palpitations, dizziness, or leg swelling  GASTROINTESTINAL: No abdominal or epigastric pain. No nausea, vomiting, or hematemesis; No diarrhea or constipation. No melena or hematochezia.  GENITOURINARY: No dysuria, frequency, hematuria, or incontinence  NEUROLOGICAL: No headaches, memory loss, loss of strength, numbness, or tremors  SKIN: No itching, burning, rashes, or lesions   LYMPH NODES: No enlarged glands  ENDOCRINE: No heat or cold intolerance; No hair loss  MUSCULOSKELETAL: No joint pain or swelling; No muscle, back, or extremity pain  PSYCHIATRIC: No depression, anxiety, mood swings, or difficulty sleeping  HEME/LYMPH: No easy bruising, or bleeding gums  ALLERGY AND IMMUNOLOGIC: No hives or eczema    Vital Signs Last 24 Hrs  T(C): 36.4 (17 Nov 2017 05:33), Max: 36.4 (16 Nov 2017 20:31)  T(F): 97.5 (17 Nov 2017 05:33), Max: 97.5 (16 Nov 2017 20:31)  HR: 60 (17 Nov 2017 05:41) (54 - 60)  BP: 133/50 (17 Nov 2017 05:33) (133/45 - 150/49)  BP(mean): --  RR: 16 (17 Nov 2017 05:33) (14 - 18)  SpO2: 98% (17 Nov 2017 05:33) (95% - 100%)    PHYSICAL EXAM:  GENERAL: NAD, well-groomed, well-developed  HEAD:  Atraumatic, Normocephalic  EYES: EOMI, PERRLA, conjunctiva and sclera clear  ENMT: No tonsillar erythema, exudates, or enlargement; Moist mucous membranes, Good dentition, No lesions  NECK: Supple, No JVD, Normal thyroid  NERVOUS SYSTEM:  Alert & Oriented X3, Good concentration; Motor Strength 5/5 B/L upper and lower extremities; DTRs 2+ intact and symmetric  CHEST/LUNG: Clear to percussion bilaterally; No rales, rhonchi, wheezing, or rubs  HEART: Regular rate and rhythm; No murmurs, rubs, or gallops  ABDOMEN: Soft, Nontender, Nondistended; Bowel sounds present  EXTREMITIES:  2+ Peripheral Pulses, No clubbing, cyanosis, or edema  LYMPH: No lymphadenopathy noted  SKIN: No rashes or lesions    LABS:                        12.8   9.9   )-----------( 212      ( 17 Nov 2017 07:01 )             39.7     11-17    139  |  101  |  51<H>  ----------------------------<  99  3.7   |  33<H>  |  1.00    Ca    8.6      17 Nov 2017 07:01          CAPILLARY BLOOD GLUCOSE          RADIOLOGY & ADDITIONAL TESTS:    Imaging Personally Reviewed:  [ ] YES  [ ] NO    Consultant(s) Notes Reviewed:  [ ] YES  [ ] NO    Care Discussed with Consultants/Other Providers [ ] YES  [ ] NO Patient is a 89y old  Female who presents with a chief complaint of shortness of breath (16 Nov 2017 14:40)      INTERVAL HPI/OVERNIGHT EVENTS: Patient reports improvement of symptoms overnight     MEDICATIONS  (STANDING):  apixaban 2.5 milliGRAM(s) Oral every 12 hours  aspirin  chewable 81 milliGRAM(s) Oral daily  atorvastatin 80 milliGRAM(s) Oral at bedtime  docusate sodium 100 milliGRAM(s) Oral two times a day  furosemide    Tablet 40 milliGRAM(s) Oral daily  levothyroxine 50 MICROGram(s) Oral daily  metoprolol     tartrate 12.5 milliGRAM(s) Oral two times a day  pantoprazole    Tablet 40 milliGRAM(s) Oral before breakfast  predniSONE   Tablet   Oral   senna 2 Tablet(s) Oral at bedtime    MEDICATIONS  (PRN):  acetaminophen   Tablet. 650 milliGRAM(s) Oral every 6 hours PRN Moderate Pain (4 - 6)      Allergies    penicillin (Unknown)        REVIEW OF SYSTEMS:  CONSTITUTIONAL: No fevers overnight   RESPIRATORY: No cough, wheezing, chills or hemoptysis; No shortness of breath  ROS limited as patient has dementia at baseline and per family is disoriented at baseline     Vital Signs Last 24 Hrs  T(C): 36.4 (17 Nov 2017 05:33), Max: 36.4 (16 Nov 2017 20:31)  T(F): 97.5 (17 Nov 2017 05:33), Max: 97.5 (16 Nov 2017 20:31)  HR: 60 (17 Nov 2017 05:41) (54 - 60)  BP: 133/50 (17 Nov 2017 05:33) (133/45 - 150/49)  BP(mean): --  RR: 16 (17 Nov 2017 05:33) (14 - 18)  SpO2: 98% (17 Nov 2017 05:33) (95% - 100%)    PHYSICAL EXAM:  GENERAL: NAD, well-groomed, well-developed  NERVOUS SYSTEM:  Alert & baseline mental status  CHEST/LUNG: Clear to percussion bilaterally; No rales, rhonchi, wheezing, or rubs  HEART: Regular rate and rhythm; No murmurs, rubs, or gallops  ABDOMEN: Soft, Nontender, Nondistended; Bowel sounds present    LABS:                        12.8   9.9   )-----------( 212      ( 17 Nov 2017 07:01 )             39.7     11-17    139  |  101  |  51<H>  ----------------------------<  99  3.7   |  33<H>  |  1.00    Ca    8.6      17 Nov 2017 07:01          CAPILLARY BLOOD GLUCOSE          RADIOLOGY & ADDITIONAL TESTS:    Imaging Personally Reviewed:  [ ] YES  [ ] NO    Consultant(s) Notes Reviewed:  [ ] YES  [ ] NO    Care Discussed with Consultants/Other Providers [ ] YES  [ ] NO

## 2017-11-17 NOTE — PROGRESS NOTE ADULT - PROBLEM SELECTOR PLAN 7
c/w Eliquis  c/w lopressor  Digoxin held secondary to high level which is downtrending; also HR controlled with lopressor; will DC digoxin as HR in 50's

## 2017-11-17 NOTE — PROGRESS NOTE ADULT - ASSESSMENT
89 YR OLD FEMALE WITH MOE IN SETTING OF CHF EXERCEBATION AND NSTEMI.  VOLUME STATUS BETTER  MOE RESOLVED. BP WELL CONTROLLED. CONT LASIX  RPT RENAL PROFILE IN AM.

## 2017-11-17 NOTE — PROGRESS NOTE ADULT - PROBLEM SELECTOR PLAN 3
will change lasix to po   daily weights  I's and O's. continue with  lasix to po   daily weights  I's and O's.

## 2017-11-17 NOTE — PROGRESS NOTE ADULT - PROBLEM SELECTOR PLAN 8
MOE in the setting of CHF exac and NSTEMI  Monitor BMP, renal function improved   Sr. Cr. normal today   avoid nephrotoxic meds.

## 2017-11-17 NOTE — PROGRESS NOTE ADULT - SUBJECTIVE AND OBJECTIVE BOX
SOB BETTER.    penicillin (Unknown)    Hospital Medications:   MEDICATIONS  (STANDING):  apixaban 2.5 milliGRAM(s) Oral every 12 hours  aspirin  chewable 81 milliGRAM(s) Oral daily  atorvastatin 80 milliGRAM(s) Oral at bedtime  docusate sodium 100 milliGRAM(s) Oral two times a day  furosemide    Tablet 40 milliGRAM(s) Oral daily  levothyroxine 50 MICROGram(s) Oral daily  metoprolol     tartrate 12.5 milliGRAM(s) Oral two times a day  pantoprazole    Tablet 40 milliGRAM(s) Oral before breakfast  predniSONE   Tablet   Oral   senna 2 Tablet(s) Oral at bedtime      VITALS:  T(F): 97.5 (17 @ 05:33), Max: 97.5 (17 @ 20:31)  HR: 60 (17 @ 05:41)  BP: 133/50 (17 @ 05:33)  RR: 16 (17 @ 05:33)  SpO2: 98% (17 @ 05:33)  Wt(kg): --      PHYSICAL EXAM:  Constitutional: NAD  HEENT: anicteric sclera, oropharynx clear.  Neck: No JVD  Respiratory: CTAB, no wheezes, rales or rhonchi  Cardiovascular: S1, S2, RRR  Gastrointestinal: BS+, soft, NT/ND  Extremities:  No peripheral edema  Neurological: A/O x 3, no focal deficits        LABS:      139  |  101  |  51<H>  ----------------------------<  99  3.7   |  33<H>  |  1.00    Ca    8.6      2017 07:01      Creatinine Trend: 1.00 <--, 1.20 <--, 1.43 <--, 1.61 <--, 1.16 <--, 1.23 <--                        12.8   9.9   )-----------( 212      ( 2017 07:01 )             39.7     Urine Studies:  Urinalysis Basic - ( 2017 08:14 )    Color: Yellow / Appearance: Clear / S.010 / pH:   Gluc:  / Ketone: Negative  / Bili: Negative / Urobili: Negative   Blood:  / Protein: Negative / Nitrite: Negative   Leuk Esterase: Negative / RBC: 0-2 /HPF / WBC 0-2 /HPF   Sq Epi:  / Non Sq Epi: Occasional /HPF / Bacteria: Many /HPF        RADIOLOGY & ADDITIONAL STUDIES:

## 2017-11-17 NOTE — PROGRESS NOTE ADULT - SUBJECTIVE AND OBJECTIVE BOX
Patient is a 89y old  Female who presents with a chief complaint of shortness of breath /stemi/cHF exac, s/p diuresis, patient improved, D/C NH today      INTERVAL HPI/OVERNIGHT EVENTS:  T(C): 36.4 (11-17-17 @ 05:33), Max: 36.4 (11-16-17 @ 20:31)  HR: 60 (11-17-17 @ 05:41) (54 - 60)  BP: 133/50 (11-17-17 @ 05:33) (133/45 - 150/49)  RR: 16 (11-17-17 @ 05:33) (14 - 18)  SpO2: 98% (11-17-17 @ 05:33) (95% - 100%)  Wt(kg): --    LABS:                        12.8   9.9   )-----------( 212      ( 17 Nov 2017 07:01 )             39.7     11-17    139  |  101  |  51<H>  ----------------------------<  99  3.7   |  33<H>  |  1.00    Ca    8.6      17 Nov 2017 07:01          CAPILLARY BLOOD GLUCOSE            RADIOLOGY & ADDITIONAL TESTS:    Consultant(s) Notes Reviewed:  [x ] YES  [ ] NO    PHYSICAL EXAM:  GENERAL: well built, well nourished  HEAD:  Atraumatic, Normocephalic  EYES: EOMI, PERRLA, conjunctiva and sclera clear  ENT: No tonsillar erythema, exudates, or enlargement; Moist mucous membranes, Good dentition, No lesions  NECK: Supple, No JVD, Normal thyroid, no enlarged nodes  NERVOUS SYSTEM:  Alert & Oriented X3, Good concentration; Motor Strength 5/5 B/L upper and lower extremities; DTRs 2+ intact and symmetric, sensory intact  CHEST/LUNG: B/L good air entry; No rales, rhonchi, or wheezing, ppm in place  HEART: S1S2 normal, no S3, Regular rate and rhythm; No murmurs, rubs, or gallops  ABDOMEN: Soft, Nontender, Nondistended; Bowel sounds present  EXTREMITIES:  2+ Peripheral Pulses, No clubbing, cyanosis, or edema  LYMPH: No lymphadenopathy noted  SKIN: No rashes or lesions    Care Discussed with Consultants/Other Providers [ x] YES  [ ] NO

## 2017-11-17 NOTE — PROGRESS NOTE ADULT - ASSESSMENT
89 female with hx of CHF, COPD, Afib, presents with acute resp failure requiring bipap, likely due to CHF exacerbation/pulmonary edema, also with NSTEMI.  Completed abx for pneumonia. Downgraded to SCU 11/14/17. DC planning

## 2017-11-17 NOTE — PROGRESS NOTE ADULT - PROBLEM SELECTOR PLAN 1
Admitted to ICU for new bipap  now saturating well on room air no longer requiring bipap   taper steroids.

## 2017-11-18 LAB
HCT VFR BLD CALC: 39.1 % — SIGNIFICANT CHANGE UP (ref 34.5–45)
HGB BLD-MCNC: 12.4 G/DL — SIGNIFICANT CHANGE UP (ref 11.5–15.5)
MCHC RBC-ENTMCNC: 30 PG — SIGNIFICANT CHANGE UP (ref 27–34)
MCHC RBC-ENTMCNC: 31.8 GM/DL — LOW (ref 32–36)
MCV RBC AUTO: 94.4 FL — SIGNIFICANT CHANGE UP (ref 80–100)
PLATELET # BLD AUTO: 197 K/UL — SIGNIFICANT CHANGE UP (ref 150–400)
RBC # BLD: 4.14 M/UL — SIGNIFICANT CHANGE UP (ref 3.8–5.2)
RBC # FLD: 12.5 % — SIGNIFICANT CHANGE UP (ref 10.3–14.5)
WBC # BLD: 9.5 K/UL — SIGNIFICANT CHANGE UP (ref 3.8–10.5)
WBC # FLD AUTO: 9.5 K/UL — SIGNIFICANT CHANGE UP (ref 3.8–10.5)

## 2017-11-18 RX ORDER — HALOPERIDOL DECANOATE 100 MG/ML
0.5 INJECTION INTRAMUSCULAR ONCE
Qty: 0 | Refills: 0 | Status: COMPLETED | OUTPATIENT
Start: 2017-11-18 | End: 2017-11-18

## 2017-11-18 RX ADMIN — PANTOPRAZOLE SODIUM 40 MILLIGRAM(S): 20 TABLET, DELAYED RELEASE ORAL at 05:53

## 2017-11-18 RX ADMIN — Medication 20 MILLIGRAM(S): at 05:53

## 2017-11-18 RX ADMIN — Medication 12.5 MILLIGRAM(S): at 18:13

## 2017-11-18 RX ADMIN — SENNA PLUS 2 TABLET(S): 8.6 TABLET ORAL at 23:03

## 2017-11-18 RX ADMIN — Medication 650 MILLIGRAM(S): at 18:14

## 2017-11-18 RX ADMIN — APIXABAN 2.5 MILLIGRAM(S): 2.5 TABLET, FILM COATED ORAL at 18:12

## 2017-11-18 RX ADMIN — Medication 81 MILLIGRAM(S): at 11:57

## 2017-11-18 RX ADMIN — ATORVASTATIN CALCIUM 80 MILLIGRAM(S): 80 TABLET, FILM COATED ORAL at 23:03

## 2017-11-18 RX ADMIN — Medication 40 MILLIGRAM(S): at 05:53

## 2017-11-18 RX ADMIN — Medication 50 MICROGRAM(S): at 05:53

## 2017-11-18 RX ADMIN — HALOPERIDOL DECANOATE 0.5 MILLIGRAM(S): 100 INJECTION INTRAMUSCULAR at 23:02

## 2017-11-18 RX ADMIN — Medication 650 MILLIGRAM(S): at 18:59

## 2017-11-18 RX ADMIN — APIXABAN 2.5 MILLIGRAM(S): 2.5 TABLET, FILM COATED ORAL at 05:52

## 2017-11-18 RX ADMIN — Medication 100 MILLIGRAM(S): at 18:14

## 2017-11-18 RX ADMIN — Medication 100 MILLIGRAM(S): at 05:53

## 2017-11-18 NOTE — PROGRESS NOTE ADULT - ATTENDING COMMENTS
89 female with hx of CHF, COPD, Afib, presents with acute resp failure requiring bipap, likely due to CHF exacerbation/pulmonary edema, also with NSTEMI.  Completed abx for pneumonia. Downgraded to SCU 11/14/17. DC planning       Problem/Plan - 1:  ·  Problem: Acute respiratory failure with hypoxia and hypercapnia.  Plan: Admitted to ICU for new bipap  now saturating well on room air no longer requiring bipap   taper steroids.      Problem/Plan - 2:  ·  Problem: NSTEMI (non-ST elevated myocardial infarction).  Plan: C/W asa, Lipitor, lopressor.      Problem/Plan - 3:  ·  Problem: CHF exacerbation.  Plan: continue with  lasix to po   daily weights  I's and O's.      Problem/Plan - 4:  ·  Problem: COPD (chronic obstructive pulmonary disease).  Plan: supplemental 02 as needed  prednisone  taper.      Problem/Plan - 5:  ·  Problem: Hypothyroidism.  Plan: c/w synthroid.      Problem/Plan - 6:  Problem: Dementia. Plan: c/w supportive care.     Problem/Plan - 7:  ·  Problem: Atrial fibrillation.  Plan: c/w Eliquis  c/w lopressor  Digoxin held secondary to high level which is downtrending and now normalized this yesterday in AM; also HR controlled with lopressor.

## 2017-11-18 NOTE — PROGRESS NOTE ADULT - ATTENDING COMMENTS
Agree with above. Continue medical management for CAD and AF. Continue eliquis 2.5, asa 81, lipitor 80, lasix 40 po, and metoprolol 12.5.  No further cardiac workup needed. Agree with above. Continue medical management for CAD and AF. Continue eliquis 2.5, asa 81, lipitor 80, lasix 40 po, and metoprolol 12.5.  No further cardiac workup needed

## 2017-11-18 NOTE — PROGRESS NOTE ADULT - PROBLEM SELECTOR PLAN 1
Assumed care of pt. Admitted to ICU for new bipap  now saturating well on room air no longer requiring bipap   taper steroids.

## 2017-11-18 NOTE — PROGRESS NOTE ADULT - PROBLEM SELECTOR PLAN 7
c/w Eliquis  c/w lopressor  Digoxin held secondary to high level which is downtrending and now normalized this yesterday in AM; also HR controlled with lopressor

## 2017-11-18 NOTE — PROGRESS NOTE ADULT - SUBJECTIVE AND OBJECTIVE BOX
Subjective:  pt seen and examined, no complaints on exam.   pt ROS -, no chest pain or palpitation on exam     acetaminophen   Tablet. 650 milliGRAM(s) Oral every 6 hours PRN  apixaban 2.5 milliGRAM(s) Oral every 12 hours  aspirin  chewable 81 milliGRAM(s) Oral daily  atorvastatin 80 milliGRAM(s) Oral at bedtime  docusate sodium 100 milliGRAM(s) Oral two times a day  furosemide    Tablet 40 milliGRAM(s) Oral daily  levothyroxine 50 MICROGram(s) Oral daily  metoprolol     tartrate 12.5 milliGRAM(s) Oral two times a day  pantoprazole    Tablet 40 milliGRAM(s) Oral before breakfast  predniSONE   Tablet   Oral   predniSONE   Tablet 20 milliGRAM(s) Oral daily  senna 2 Tablet(s) Oral at bedtime                            12.8   9.9   )-----------( 212      ( 17 Nov 2017 07:01 )             39.7       Hemoglobin: 12.8 g/dL (11-17 @ 07:01)  Hemoglobin: 12.1 g/dL (11-16 @ 07:24)  Hemoglobin: 11.3 g/dL (11-15 @ 06:50)  Hemoglobin: 11.1 g/dL (11-14 @ 05:58)  Hemoglobin: 11.6 g/dL (11-14 @ 01:25)      11-17    139  |  101  |  51<H>  ----------------------------<  99  3.7   |  33<H>  |  1.00    Ca    8.6      17 Nov 2017 07:01      Creatinine Trend: 1.00<--, 1.20<--, 1.43<--, 1.61<--, 1.16<--, 1.23<--    COAGS:     CARDIAC MARKERS ( 15 Nov 2017 13:01 )  1.560 ng/mL / x     / x     / x     / x            T(C): 36.6 (11-17-17 @ 20:53), Max: 36.6 (11-17-17 @ 20:53)  HR: 50 (11-17-17 @ 21:59) (50 - 62)  BP: 166/58 (11-17-17 @ 21:59) (133/50 - 176/60)  RR: 14 (11-17-17 @ 20:53) (14 - 17)  SpO2: 98% (11-17-17 @ 20:53) (98% - 99%)  Wt(kg): --    I&O's Summary      Appearance: Normal	  HEENT:   Normal oral mucosa, PERRL, EOMI	  Lymphatic: No lymphadenopathy , +  edema  Cardiovascular: irreg  S1 S2, No JVD, No murmurs , Peripheral pulses palpable 2+ bilaterally  Respiratory: Lungs clear to auscultation, normal effort 	  Gastrointestinal:  Soft, Non-tender, + BS	      TELEMETRY: 	 off     DIAGNOSTIC TESTING:  [ ] Echocardiogram:  < from: Transthoracic Echocardiogram (11.12.17 @ 07:37) >  CONCLUSIONS:  1. Densly calcified mitral valve leaflet, mitral annulus  calcification. Mild-moderate mitral regurgitation.  2. Calcified trileaflet aortic valve with normal opening.  Mild aortic regurgitation.  3. Aortic Root: 3.3 cm.  4. Mild left atrial enlargement.  5. Moderate concentric left ventricular hypertrophy.  6. Normal Left Ventricular Systolic Function,  (EF = 55 to  60%)  7. Grade II diastolic dysfunction.  8. Normal right atrium.  9. Right ventricle not well visualized.  10. RA Pressure is 10 mm Hg.  11. RV systolic pressure is 39 mm Hg.  12. There is mild tricuspid regurgitation.  13. Small pericardial effusion. No echocardiographic  evidence of pericardial tamponade.  14. Bilateral pleural effusions.    < end of copied text >    [ ]  Catheterization:  [ ] Stress Test:     OTHER: 	        ASSESSMENT/PLAN: 	89y Female hx of Afib, chol, htn, cad, chf, dementia , now resp distress, NSTEMI, chf excab  asa, statin , BB - HR stable rate  cont eliquis at present  for Afib  diuresis with now PO lasix - keep net neg daily    GI / DVT prophylaxis.   keep K>4, mag >2.0    renal follow up     cont Steroid taper - off bipap for 48 hr now , looks well   D/W Dr Dailey

## 2017-11-18 NOTE — PROGRESS NOTE ADULT - SUBJECTIVE AND OBJECTIVE BOX
Patient is a 89y old  Female who presents with a chief complaint of shortness of breath (16 Nov 2017 14:40)      INTERVAL HPI/OVERNIGHT EVENTS:    MEDICATIONS  (STANDING):  apixaban 2.5 milliGRAM(s) Oral every 12 hours  aspirin  chewable 81 milliGRAM(s) Oral daily  atorvastatin 80 milliGRAM(s) Oral at bedtime  docusate sodium 100 milliGRAM(s) Oral two times a day  furosemide    Tablet 40 milliGRAM(s) Oral daily  levothyroxine 50 MICROGram(s) Oral daily  metoprolol     tartrate 12.5 milliGRAM(s) Oral two times a day  pantoprazole    Tablet 40 milliGRAM(s) Oral before breakfast  predniSONE   Tablet   Oral   senna 2 Tablet(s) Oral at bedtime    MEDICATIONS  (PRN):  acetaminophen   Tablet. 650 milliGRAM(s) Oral every 6 hours PRN Moderate Pain (4 - 6)      Allergies    penicillin (Unknown)        REVIEW OF SYSTEMS:  CONSTITUTIONAL: No fevers overnight   RESPIRATORY: No cough, wheezing, chills or hemoptysis; No shortness of breath  ROS limited as patient has dementia at baseline and per family is disoriented at baseline     Vital Signs Last 24 Hrs  T(C): 36.4 (17 Nov 2017 05:33), Max: 36.4 (16 Nov 2017 20:31)  T(F): 97.5 (17 Nov 2017 05:33), Max: 97.5 (16 Nov 2017 20:31)  HR: 60 (17 Nov 2017 05:41) (54 - 60)  BP: 133/50 (17 Nov 2017 05:33) (133/45 - 150/49)  BP(mean): --  RR: 16 (17 Nov 2017 05:33) (14 - 18)  SpO2: 98% (17 Nov 2017 05:33) (95% - 100%)    PHYSICAL EXAM:  GENERAL: NAD, well-groomed, well-developed  NERVOUS SYSTEM:  Alert & baseline mental status  CHEST/LUNG: Clear to percussion bilaterally; No rales, rhonchi, wheezing, or rubs  HEART: Regular rate and rhythm; No murmurs, rubs, or gallops  ABDOMEN: Soft, Nontender, Nondistended; Bowel sounds present    LABS:                        12.8   9.9   )-----------( 212      ( 17 Nov 2017 07:01 )             39.7     11-17    139  |  101  |  51<H>  ----------------------------<  99  3.7   |  33<H>  |  1.00    Ca    8.6      17 Nov 2017 07:01          CAPILLARY BLOOD GLUCOSE          RADIOLOGY & ADDITIONAL TESTS:    Imaging Personally Reviewed:  [ ] YES  [ ] NO    Consultant(s) Notes Reviewed:  [ ] YES  [ ] NO    Care Discussed with Consultants/Other Providers [ ] YES  [ ] NO

## 2017-11-19 RX ADMIN — SENNA PLUS 2 TABLET(S): 8.6 TABLET ORAL at 23:27

## 2017-11-19 RX ADMIN — APIXABAN 2.5 MILLIGRAM(S): 2.5 TABLET, FILM COATED ORAL at 06:13

## 2017-11-19 RX ADMIN — Medication 50 MICROGRAM(S): at 06:12

## 2017-11-19 RX ADMIN — ATORVASTATIN CALCIUM 80 MILLIGRAM(S): 80 TABLET, FILM COATED ORAL at 23:26

## 2017-11-19 RX ADMIN — Medication 81 MILLIGRAM(S): at 12:00

## 2017-11-19 RX ADMIN — Medication 650 MILLIGRAM(S): at 13:22

## 2017-11-19 RX ADMIN — Medication 100 MILLIGRAM(S): at 06:13

## 2017-11-19 RX ADMIN — Medication 20 MILLIGRAM(S): at 06:12

## 2017-11-19 RX ADMIN — Medication 12.5 MILLIGRAM(S): at 06:13

## 2017-11-19 RX ADMIN — Medication 12.5 MILLIGRAM(S): at 17:26

## 2017-11-19 RX ADMIN — APIXABAN 2.5 MILLIGRAM(S): 2.5 TABLET, FILM COATED ORAL at 17:26

## 2017-11-19 RX ADMIN — Medication 650 MILLIGRAM(S): at 14:30

## 2017-11-19 RX ADMIN — Medication 100 MILLIGRAM(S): at 17:26

## 2017-11-19 RX ADMIN — PANTOPRAZOLE SODIUM 40 MILLIGRAM(S): 20 TABLET, DELAYED RELEASE ORAL at 06:12

## 2017-11-19 RX ADMIN — Medication 40 MILLIGRAM(S): at 06:12

## 2017-11-19 NOTE — PROGRESS NOTE ADULT - SUBJECTIVE AND OBJECTIVE BOX
INTERVAL HPI/OVERNIGHT EVENTS: no overnight events      T(C): 36.6 (11-19-17 @ 05:15), Max: 36.7 (11-18-17 @ 20:37)  HR: 49 (11-19-17 @ 05:15) (49 - 62)  BP: 142/52 (11-19-17 @ 05:15) (121/47 - 145/64)  RR: 17 (11-19-17 @ 05:15) (16 - 18)  SpO2: 99% (11-19-17 @ 05:15) (99% - 100%)  Wt(kg): --  I&O's Summary      REVIEW OF SYSTEMS: denies fever, chills, SOB, palpitations, chest pain, abdominal pain, nausea, vomitting, diarrhea, constipation, dizziness    MEDICATIONS  (STANDING):  apixaban 2.5 milliGRAM(s) Oral every 12 hours  aspirin  chewable 81 milliGRAM(s) Oral daily  atorvastatin 80 milliGRAM(s) Oral at bedtime  docusate sodium 100 milliGRAM(s) Oral two times a day  furosemide    Tablet 40 milliGRAM(s) Oral daily  levothyroxine 50 MICROGram(s) Oral daily  metoprolol     tartrate 12.5 milliGRAM(s) Oral two times a day  pantoprazole    Tablet 40 milliGRAM(s) Oral before breakfast  predniSONE   Tablet   Oral   senna 2 Tablet(s) Oral at bedtime    MEDICATIONS  (PRN):  acetaminophen   Tablet. 650 milliGRAM(s) Oral every 6 hours PRN Moderate Pain (4 - 6)      PHYSICAL EXAM:  GENERAL: NAD, well-groomed, well-developed  HEAD:  Atraumatic, Normocephalic  EYES: EOMI, PERRLA, conjunctiva and sclera clear  ENMT: No tonsillar erythema, exudates, or enlargement; Moist mucous membranes, Good dentition, No lesions  NECK: Supple, No JVD, Normal thyroid  NERVOUS SYSTEM:  Alert & Oriented X3, Good concentration; Motor Strength 5/5 B/L upper and lower extremities; DTRs 2+ intact and symmetric  CHEST/LUNG: Clear to percussion bilaterally; No rales, rhonchi, wheezing, or rubs  HEART: Regular rate and rhythm; No murmurs, rubs, or gallops  ABDOMEN: Soft, Nontender, Nondistended; Bowel sounds present  EXTREMITIES:  2+ Peripheral Pulses, No clubbing, cyanosis, or edema  LYMPH: No lymphadenopathy noted  SKIN: No rashes or lesions  LABS:                        12.4   9.5   )-----------( 197      ( 18 Nov 2017 08:19 )             39.1 INTERVAL HPI/OVERNIGHT EVENTS: agitated, received Haldol overnight      T(C): 36.6 (11-19-17 @ 05:15), Max: 36.7 (11-18-17 @ 20:37)  HR: 49 (11-19-17 @ 05:15) (49 - 62)  BP: 142/52 (11-19-17 @ 05:15) (121/47 - 145/64)  RR: 17 (11-19-17 @ 05:15) (16 - 18)  SpO2: 99% (11-19-17 @ 05:15) (99% - 100%)  Wt(kg): --  I&O's Summary      REVIEW OF SYSTEMS: denies fever, chills, SOB, palpitations, chest pain, abdominal pain, nausea, vomitting, diarrhea, constipation, dizziness    MEDICATIONS  (STANDING):  apixaban 2.5 milliGRAM(s) Oral every 12 hours  aspirin  chewable 81 milliGRAM(s) Oral daily  atorvastatin 80 milliGRAM(s) Oral at bedtime  docusate sodium 100 milliGRAM(s) Oral two times a day  furosemide    Tablet 40 milliGRAM(s) Oral daily  levothyroxine 50 MICROGram(s) Oral daily  metoprolol     tartrate 12.5 milliGRAM(s) Oral two times a day  pantoprazole    Tablet 40 milliGRAM(s) Oral before breakfast  predniSONE   Tablet   Oral   senna 2 Tablet(s) Oral at bedtime    MEDICATIONS  (PRN):  acetaminophen   Tablet. 650 milliGRAM(s) Oral every 6 hours PRN Moderate Pain (4 - 6)      PHYSICAL EXAM:  GENERAL: NAD, well-groomed, well-developed  NERVOUS SYSTEM:  Alert & baseline mental status  CHEST/LUNG: Clear to percussion bilaterally; No rales, rhonchi, wheezing, or rubs  HEART: Regular rate and rhythm; No murmurs, rubs, or gallops  ABDOMEN: Soft, Nontender, Nondistended; Bowel sounds present                        12.4   9.5   )-----------( 197      ( 18 Nov 2017 08:19 )             39.1

## 2017-11-19 NOTE — PROGRESS NOTE ADULT - ATTENDING COMMENTS
Agree with above. Continue medical management for CAD and AF. Continue eliquis 2.5, asa 81, lipitor 80, lasix 40 po, and metoprolol 12.5.  No further cardiac workup needed.

## 2017-11-19 NOTE — PROGRESS NOTE ADULT - SUBJECTIVE AND OBJECTIVE BOX
Subjective:  pt seen and examined, no complaints on exam.   pt ROS -,    acetaminophen   Tablet. 650 milliGRAM(s) Oral every 6 hours PRN  apixaban 2.5 milliGRAM(s) Oral every 12 hours  aspirin  chewable 81 milliGRAM(s) Oral daily  atorvastatin 80 milliGRAM(s) Oral at bedtime  docusate sodium 100 milliGRAM(s) Oral two times a day  furosemide    Tablet 40 milliGRAM(s) Oral daily  levothyroxine 50 MICROGram(s) Oral daily  metoprolol     tartrate 12.5 milliGRAM(s) Oral two times a day  pantoprazole    Tablet 40 milliGRAM(s) Oral before breakfast  predniSONE   Tablet   Oral   predniSONE   Tablet 20 milliGRAM(s) Oral daily  senna 2 Tablet(s) Oral at bedtime                            12.4   9.5   )-----------( 197      ( 18 Nov 2017 08:19 )             39.1       Hemoglobin: 12.4 g/dL (11-18 @ 08:19)  Hemoglobin: 12.8 g/dL (11-17 @ 07:01)  Hemoglobin: 12.1 g/dL (11-16 @ 07:24)  Hemoglobin: 11.3 g/dL (11-15 @ 06:50)  Hemoglobin: 11.1 g/dL (11-14 @ 05:58)      11-17    139  |  101  |  51<H>  ----------------------------<  99  3.7   |  33<H>  |  1.00    Ca    8.6      17 Nov 2017 07:01      Creatinine Trend: 1.00<--, 1.20<--, 1.43<--, 1.61<--, 1.16<--, 1.23<--    COAGS:     T(C): 36.7 (11-18-17 @ 20:37), Max: 36.7 (11-18-17 @ 20:37)  HR: 56 (11-18-17 @ 20:37) (53 - 62)  BP: 121/47 (11-18-17 @ 20:37) (121/47 - 148/56)  RR: 16 (11-18-17 @ 20:37) (14 - 18)  SpO2: 99% (11-18-17 @ 20:37) (95% - 100%)  Wt(kg): --    I&O's Summary    Appearance: Normal	  HEENT:   Normal oral mucosa, PERRL, EOMI	  Lymphatic: No lymphadenopathy , +  edema  Cardiovascular: irreg  S1 S2, No JVD, No murmurs , Peripheral pulses palpable 2+ bilaterally  Respiratory: Lungs clear to auscultation, normal effort 	  Gastrointestinal:  Soft, Non-tender, + BS	      TELEMETRY: 	 off     DIAGNOSTIC TESTING:  [ ] Echocardiogram:  < from: Transthoracic Echocardiogram (11.12.17 @ 07:37) >  CONCLUSIONS:  1. Densly calcified mitral valve leaflet, mitral annulus  calcification. Mild-moderate mitral regurgitation.  2. Calcified trileaflet aortic valve with normal opening.  Mild aortic regurgitation.  3. Aortic Root: 3.3 cm.  4. Mild left atrial enlargement.  5. Moderate concentric left ventricular hypertrophy.  6. Normal Left Ventricular Systolic Function,  (EF = 55 to  60%)  7. Grade II diastolic dysfunction.  8. Normal right atrium.  9. Right ventricle not well visualized.  10. RA Pressure is 10 mm Hg.  11. RV systolic pressure is 39 mm Hg.  12. There is mild tricuspid regurgitation.  13. Small pericardial effusion. No echocardiographic  evidence of pericardial tamponade.  14. Bilateral pleural effusions.    < end of copied text >    [ ]  Catheterization:  [ ] Stress Test:     OTHER: 	        ASSESSMENT/PLAN: 	89y Female hx of Afib, chol, htn, cad, chf, dementia , now resp distress, NSTEMI, chf excab    asa, statin , BB - HR stable rate  cont eliquis at present  for Afib  diuresis with now PO lasix - keep net neg daily    GI / DVT prophylaxis.   keep K>4, mag >2.0    renal follow up     cont Steroid taper  D/W Dr Chilel

## 2017-11-19 NOTE — PROGRESS NOTE ADULT - ASSESSMENT
89 yr old female, from NH, H/O A FIB/CHF/COPD/HLD/CAD/dementia, admit hospital for acute respirotary distress/resp failure/shock, admit ICU, S/P BIPAP, IV ABS, elevated BNP/troponin, on heparin drip,cardiology F/U, IV ABS, ICU care, monitor labs. elevated BUN/Cr, IV hydration, monitor labs. patient improved, change  lasix po, D/C NH F/U cardiology out patient whence authorized from insurance. fall precaution

## 2017-11-19 NOTE — PROGRESS NOTE ADULT - ATTENDING COMMENTS
89 female with hx of CHF, COPD, Afib, presents with acute resp failure requiring bipap, likely due to CHF exacerbation/pulmonary edema, also with NSTEMI.  Completed abx for pneumonia. Downgraded to SCU 11/14/17. DC planning       Problem/Plan - 1:  ·  Problem: Acute respiratory failure with hypoxia and hypercapnia.  Plan: Admitted to ICU for new bipap  now saturating well on room air no longer requiring bipap   taper steroids.      Problem/Plan - 2:  ·  Problem: NSTEMI (non-ST elevated myocardial infarction).  Plan: C/W asa, Lipitor, lopressor.      Problem/Plan - 3:  ·  Problem: CHF exacerbation.  Plan: continue with  lasix to po   daily weights  I's and O's.      Problem/Plan - 4:  ·  Problem: COPD (chronic obstructive pulmonary disease).  Plan: supplemental 02 as needed  prednisone  taper.      Problem/Plan - 5:  ·  Problem: Hypothyroidism.  Plan: c/w synthroid.      Problem/Plan - 6:  Problem: Dementia. Plan: c/w supportive care.     Problem/Plan - 7:  ·  Problem: Atrial fibrillation.  Plan: c/w Eliquis  c/w lopressor  Digoxin held secondary to high level which is downtrending and now normalized this yesterday in AM; also HR controlled with lopressor.      Problem/Plan - 8:  ·  Problem: MOE (acute kidney injury).  Plan: MOE in the setting of CHF exac and NSTEMI  Monitor BMP, renal function improved   Sr. Cr. normal today

## 2017-11-19 NOTE — PROGRESS NOTE ADULT - SUBJECTIVE AND OBJECTIVE BOX
Patient is a 89y old  Female who presents with a chief complaint of shortness of breath/CHF exac/NSTEMI, patient improved, pending D/C NH      INTERVAL HPI/OVERNIGHT EVENTS:  T(C): 36.6 (11-19-17 @ 05:15), Max: 36.7 (11-18-17 @ 20:37)  HR: 49 (11-19-17 @ 05:15) (49 - 62)  BP: 142/52 (11-19-17 @ 05:15) (121/47 - 145/64)  RR: 17 (11-19-17 @ 05:15) (16 - 18)  SpO2: 99% (11-19-17 @ 05:15) (99% - 100%)  Wt(kg): --    LABS:                        12.4   9.5   )-----------( 197      ( 18 Nov 2017 08:19 )             39.1               CAPILLARY BLOOD GLUCOSE            RADIOLOGY & ADDITIONAL TESTS:    Consultant(s) Notes Reviewed:  [x ] YES  [ ] NO    PHYSICAL EXAM:  GENERAL: well built, well nourished  HEAD:  Atraumatic, Normocephalic  EYES: EOMI, PERRLA, conjunctiva and sclera clear  ENT: No tonsillar erythema, exudates, or enlargement; Moist mucous membranes, Good dentition, No lesions  NECK: Supple, No JVD, Normal thyroid, no enlarged nodes  NERVOUS SYSTEM:  Alert & Oriented X3, Good concentration; Motor Strength 5/5 B/L upper and lower extremities; DTRs 2+ intact and symmetric, sensory intact  CHEST/LUNG: B/L good air entry; No rales, rhonchi, or wheezing  HEART: S1S2 normal, no S3, Regular rate and rhythm; No murmurs, rubs, or gallops  ABDOMEN: Soft, Nontender, Nondistended; Bowel sounds present  EXTREMITIES:  2+ Peripheral Pulses, No clubbing, cyanosis, or edema  LYMPH: No lymphadenopathy noted  SKIN: No rashes or lesions    Care Discussed with Consultants/Other Providers [ x] YES  [ ] NO

## 2017-11-20 RX ADMIN — Medication 40 MILLIGRAM(S): at 06:04

## 2017-11-20 RX ADMIN — Medication 100 MILLIGRAM(S): at 18:32

## 2017-11-20 RX ADMIN — Medication 10 MILLIGRAM(S): at 06:05

## 2017-11-20 RX ADMIN — ATORVASTATIN CALCIUM 80 MILLIGRAM(S): 80 TABLET, FILM COATED ORAL at 21:34

## 2017-11-20 RX ADMIN — SENNA PLUS 2 TABLET(S): 8.6 TABLET ORAL at 21:34

## 2017-11-20 RX ADMIN — PANTOPRAZOLE SODIUM 40 MILLIGRAM(S): 20 TABLET, DELAYED RELEASE ORAL at 06:05

## 2017-11-20 RX ADMIN — APIXABAN 2.5 MILLIGRAM(S): 2.5 TABLET, FILM COATED ORAL at 06:04

## 2017-11-20 RX ADMIN — Medication 50 MICROGRAM(S): at 06:04

## 2017-11-20 RX ADMIN — APIXABAN 2.5 MILLIGRAM(S): 2.5 TABLET, FILM COATED ORAL at 18:32

## 2017-11-20 RX ADMIN — Medication 12.5 MILLIGRAM(S): at 18:31

## 2017-11-20 RX ADMIN — Medication 100 MILLIGRAM(S): at 06:04

## 2017-11-20 RX ADMIN — Medication 12.5 MILLIGRAM(S): at 06:05

## 2017-11-20 RX ADMIN — Medication 81 MILLIGRAM(S): at 11:56

## 2017-11-20 NOTE — PROGRESS NOTE ADULT - SUBJECTIVE AND OBJECTIVE BOX
DNR [x ]   DNI  [ x ]    INTERVAL HPI/OVERNIGHT EVENTS: No acute events; no complaints; doing well, DC planning    PRESSORS: [ ] YES [x ] NO  WHICH:      Cardiovascular:  Moderate concentric left ventricular hypertrophy. Normal Left Ventricular Systolic Function,  (EF = 55 to  60%) Grade II diastolic dysfunction.    furosemide    Tablet 40 milliGRAM(s) Oral daily  metoprolol     tartrate 12.5 milliGRAM(s) Oral two times a day    Pulmonary:    Hematalogic:  apixaban 2.5 milliGRAM(s) Oral every 12 hours  aspirin  chewable 81 milliGRAM(s) Oral daily    Other:  acetaminophen   Tablet. 650 milliGRAM(s) Oral every 6 hours PRN  atorvastatin 80 milliGRAM(s) Oral at bedtime  docusate sodium 100 milliGRAM(s) Oral two times a day  levothyroxine 50 MICROGram(s) Oral daily  pantoprazole    Tablet 40 milliGRAM(s) Oral before breakfast  predniSONE   Tablet   Oral   predniSONE   Tablet 10 milliGRAM(s) Oral daily  senna 2 Tablet(s) Oral at bedtime    acetaminophen   Tablet. 650 milliGRAM(s) Oral every 6 hours PRN  apixaban 2.5 milliGRAM(s) Oral every 12 hours  aspirin  chewable 81 milliGRAM(s) Oral daily  atorvastatin 80 milliGRAM(s) Oral at bedtime  docusate sodium 100 milliGRAM(s) Oral two times a day  furosemide    Tablet 40 milliGRAM(s) Oral daily  levothyroxine 50 MICROGram(s) Oral daily  metoprolol     tartrate 12.5 milliGRAM(s) Oral two times a day  pantoprazole    Tablet 40 milliGRAM(s) Oral before breakfast  predniSONE   Tablet   Oral   predniSONE   Tablet 10 milliGRAM(s) Oral daily  senna 2 Tablet(s) Oral at bedtime    Drug Dosing Weight    Weight (kg): 70.3 (12 Nov 2017 04:29)    CENTRAL LINE: [ ] YES [ ] NO  LOCATION:   DATE INSERTED:  REMOVE: [ ] YES [ ] NO  EXPLAIN:    [place]: [ ] YES [ ] NO    DATE INSERTED:  REMOVE:  [ ] YES [ ] NO  EXPLAIN:    A-LINE:  [ ] YES [ ] NO  LOCATION:   DATE INSERTED:  REMOVE:  [ ] YES [ ] NO  EXPLAIN:    PAST MEDICAL & SURGICAL HISTORY:  Hypothyroidism  HLD (hyperlipidemia)  Osteoporosis  Dementia  HTN (hypertension)  Heart failure  NSTEMI (non-ST elevated myocardial infarction)  Atrial fibrillation  COPD (chronic obstructive pulmonary disease)  No significant past surgical history                    PHYSICAL EXAM:    GENERAL: NAD, well-groomed, well-developed  HEAD:  Atraumatic, Normocephalic  EYES: EOMI, PERRLA, conjunctiva and sclera clear  ENMT: No tonsillar erythema, exudates, or enlargement; Moist mucous membranes, Good dentition, No lesions  NECK: Supple, No JVD, Normal thyroid  NERVOUS SYSTEM:  Alert & Oriented X3, Good concentration; Motor Strength 5/5 B/L upper and lower extremities; DTRs 2+ intact and symmetric  CHEST/LUNG: Clear to percussion bilaterally; No rales, rhonchi, wheezing, or rubs  HEART: Regular rate and rhythm; No murmurs, rubs, or gallops  ABDOMEN: Soft, Nontender, Nondistended; Bowel sounds present  EXTREMITIES:  2+ Peripheral Pulses, No clubbing, cyanosis, or edema  LYMPH: No lymphadenopathy noted  SKIN: No rashes or lesions      LABS:                    [  ]  DVT Prophylaxis  [  ]  Nutrition, Brand, Rate         Goal Rate         Abdominal Nutritional Status -  Malnutrition   Cachexia      Morbid Obesity BMI >/=40    RADIOLOGY & ADDITIONAL STUDIES:  ***    [  ] Goals of Care Discussion with Family/Proxy/Other           Elements of Conversation Discussed: Patient/Family understanding of current illness   Advanced Directives                                                                       Prognosis  Treatment Options  Care Aligned with patient's wishes                                             TIME SPENT: 35 minutes

## 2017-11-20 NOTE — PROGRESS NOTE ADULT - PROBLEM SELECTOR PLAN 1
Admitted to ICU for new bipap  now saturating well on room air no longer requiring bipap  finish steroid taper

## 2017-11-20 NOTE — PROGRESS NOTE ADULT - SUBJECTIVE AND OBJECTIVE BOX
Patient feels well, No complaints ROS (-)    acetaminophen   Tablet. 650 milliGRAM(s) Oral every 6 hours PRN  apixaban 2.5 milliGRAM(s) Oral every 12 hours  aspirin  chewable 81 milliGRAM(s) Oral daily  atorvastatin 80 milliGRAM(s) Oral at bedtime  docusate sodium 100 milliGRAM(s) Oral two times a day  furosemide    Tablet 40 milliGRAM(s) Oral daily  levothyroxine 50 MICROGram(s) Oral daily  metoprolol     tartrate 12.5 milliGRAM(s) Oral two times a day  pantoprazole    Tablet 40 milliGRAM(s) Oral before breakfast  predniSONE   Tablet   Oral   predniSONE   Tablet 10 milliGRAM(s) Oral daily  senna 2 Tablet(s) Oral at bedtime          Hemoglobin: 12.4 g/dL (11-18 @ 08:19)  Hemoglobin: 12.8 g/dL (11-17 @ 07:01)  Hemoglobin: 12.1 g/dL (11-16 @ 07:24)            Creatinine Trend: 1.00<--, 1.20<--, 1.43<--, 1.61<--, 1.16<--, 1.23<--    COAGS:           T(C): 36.7 (11-20-17 @ 05:00), Max: 36.8 (11-19-17 @ 13:03)  HR: 59 (11-20-17 @ 05:00) (59 - 67)  BP: 143/57 (11-20-17 @ 05:00) (137/62 - 143/57)  RR: 16 (11-20-17 @ 05:00) (16 - 17)  SpO2: 97% (11-20-17 @ 05:00) (97% - 99%)  Wt(kg): --    I&O's Summary      Appearance: Normal	  HEENT:   Normal oral mucosa, PERRL, EOMI	  Lymphatic: No lymphadenopathy , (-)  edema  Cardiovascular: irreg  S1 S2, No JVD, No murmurs , Peripheral pulses palpable 2+ bilaterally  Respiratory: Lungs clear to auscultation, normal effort 	  Gastrointestinal:  Soft, Non-tender, + BS	      TELEMETRY: 	 off     DIAGNOSTIC TESTING:  [ ] Echocardiogram:  < from: Transthoracic Echocardiogram (11.12.17 @ 07:37) >  CONCLUSIONS:  1. Densly calcified mitral valve leaflet, mitral annulus  calcification. Mild-moderate mitral regurgitation.  2. Calcified trileaflet aortic valve with normal opening.  Mild aortic regurgitation.  3. Aortic Root: 3.3 cm.  4. Mild left atrial enlargement.  5. Moderate concentric left ventricular hypertrophy.  6. Normal Left Ventricular Systolic Function,  (EF = 55 to  60%)  7. Grade II diastolic dysfunction.  8. Normal right atrium.  9. Right ventricle not well visualized.  10. RA Pressure is 10 mm Hg.  11. RV systolic pressure is 39 mm Hg.  12. There is mild tricuspid regurgitation.  13. Small pericardial effusion. No echocardiographic  evidence of pericardial tamponade.  14. Bilateral pleural effusions.    < end of copied text >    [ ]  Catheterization:  [ ] Stress Test:     OTHER: 	        ASSESSMENT/PLAN: 	89y Female hx of Afib, chol, htn, cad, chf, dementia , now resp distress, NSTEMI, chf excab    - Cont Asa, statin BB  - Clinically improved  - doing well on PO Wade Dailey MD, FACC  [facility], [facility]  [address] [place] [address]  PHONE:  [phone]  BEEPER : [phone]

## 2017-11-20 NOTE — PROGRESS NOTE ADULT - PROBLEM SELECTOR PLAN 8
MOE in the setting of CHF exac and NSTEMI  Monitor BMP, renal function improved   Sr. Cr.  avoid nephrotoxic meds.

## 2017-11-20 NOTE — PROGRESS NOTE ADULT - ATTENDING COMMENTS
89 female with hx of CHFpEF, COPD, afib, admitted with NSTEMI and acute respiratory failure requiring bipap likely due to CHF exacerbation.  Now doing better, d/c planning.

## 2017-11-20 NOTE — PROGRESS NOTE ADULT - SUBJECTIVE AND OBJECTIVE BOX
Patient is a 89y old  Female who presents with a chief complaint of shortness of breath/CHF exac/NTEMI/improved, pending D/C to rehab      INTERVAL HPI/OVERNIGHT EVENTS:  T(C): 36.7 (11-20-17 @ 05:00), Max: 36.8 (11-19-17 @ 13:03)  HR: 59 (11-20-17 @ 05:00) (59 - 67)  BP: 143/57 (11-20-17 @ 05:00) (137/62 - 143/57)  RR: 16 (11-20-17 @ 05:00) (16 - 17)  SpO2: 97% (11-20-17 @ 05:00) (97% - 99%)  Wt(kg): --    LABS:              CAPILLARY BLOOD GLUCOSE            RADIOLOGY & ADDITIONAL TESTS:    Consultant(s) Notes Reviewed:  [x ] YES  [ ] NO    PHYSICAL EXAM:  GENERAL: well built, well nourished  HEAD:  Atraumatic, Normocephalic  EYES: EOMI, PERRLA, conjunctiva and sclera clear  ENT: No tonsillar erythema, exudates, or enlargement; Moist mucous membranes, Good dentition, No lesions  NECK: Supple, No JVD, Normal thyroid, no enlarged nodes  NERVOUS SYSTEM:  Alert & Oriented X3, Good concentration; Motor Strength 5/5 B/L upper and lower extremities; DTRs 2+ intact and symmetric, sensory intact  CHEST/LUNG: B/L good air entry; No rales, rhonchi, or wheezing  HEART: S1S2 normal, no S3, Regular rate and rhythm; No murmurs, rubs, or gallops  ABDOMEN: Soft, Nontender, Nondistended; Bowel sounds present  EXTREMITIES:  2+ Peripheral Pulses, No clubbing, cyanosis, or edema  LYMPH: No lymphadenopathy noted  SKIN: No rashes or lesions    Care Discussed with Consultants/Other Providers [ x] YES  [ ] NO

## 2017-11-20 NOTE — PROGRESS NOTE ADULT - PROBLEM SELECTOR PROBLEM 8
MOE (acute kidney injury)

## 2017-11-21 RX ORDER — DOCUSATE SODIUM 100 MG
1 CAPSULE ORAL
Qty: 0 | Refills: 0 | COMMUNITY
Start: 2017-11-21

## 2017-11-21 RX ORDER — SENNA PLUS 8.6 MG/1
2 TABLET ORAL
Qty: 0 | Refills: 0 | COMMUNITY
Start: 2017-11-21

## 2017-11-21 RX ORDER — APIXABAN 2.5 MG/1
1 TABLET, FILM COATED ORAL
Qty: 60 | Refills: 0 | OUTPATIENT
Start: 2017-11-21 | End: 2017-12-21

## 2017-11-21 RX ORDER — FUROSEMIDE 40 MG
1 TABLET ORAL
Qty: 30 | Refills: 0 | OUTPATIENT
Start: 2017-11-21 | End: 2017-12-21

## 2017-11-21 RX ORDER — SIMVASTATIN 20 MG/1
1 TABLET, FILM COATED ORAL
Qty: 30 | Refills: 0 | OUTPATIENT
Start: 2017-11-21 | End: 2017-12-21

## 2017-11-21 RX ORDER — SIMVASTATIN 20 MG/1
1 TABLET, FILM COATED ORAL
Qty: 0 | Refills: 0 | COMMUNITY

## 2017-11-21 RX ORDER — METOPROLOL TARTRATE 50 MG
0.5 TABLET ORAL
Qty: 30 | Refills: 0 | OUTPATIENT
Start: 2017-11-21 | End: 2017-12-21

## 2017-11-21 RX ADMIN — Medication 10 MILLIGRAM(S): at 05:52

## 2017-11-21 RX ADMIN — Medication 12.5 MILLIGRAM(S): at 17:53

## 2017-11-21 RX ADMIN — Medication 81 MILLIGRAM(S): at 11:47

## 2017-11-21 RX ADMIN — PANTOPRAZOLE SODIUM 40 MILLIGRAM(S): 20 TABLET, DELAYED RELEASE ORAL at 05:53

## 2017-11-21 RX ADMIN — Medication 100 MILLIGRAM(S): at 17:54

## 2017-11-21 RX ADMIN — APIXABAN 2.5 MILLIGRAM(S): 2.5 TABLET, FILM COATED ORAL at 17:54

## 2017-11-21 RX ADMIN — ATORVASTATIN CALCIUM 80 MILLIGRAM(S): 80 TABLET, FILM COATED ORAL at 21:46

## 2017-11-21 RX ADMIN — Medication 40 MILLIGRAM(S): at 05:51

## 2017-11-21 RX ADMIN — Medication 12.5 MILLIGRAM(S): at 05:51

## 2017-11-21 RX ADMIN — Medication 50 MICROGRAM(S): at 05:52

## 2017-11-21 RX ADMIN — SENNA PLUS 2 TABLET(S): 8.6 TABLET ORAL at 21:46

## 2017-11-21 RX ADMIN — APIXABAN 2.5 MILLIGRAM(S): 2.5 TABLET, FILM COATED ORAL at 05:51

## 2017-11-21 NOTE — PROGRESS NOTE ADULT - PROBLEM SELECTOR PLAN 1
Admitted to ICU for new bipap  now saturating well on room air no longer requiring bipap  finish steroid taper.

## 2017-11-21 NOTE — PROGRESS NOTE ADULT - SUBJECTIVE AND OBJECTIVE BOX
Patient feels well, No complaints ROS (-)    acetaminophen   Tablet. 650 milliGRAM(s) Oral every 6 hours PRN  apixaban 2.5 milliGRAM(s) Oral every 12 hours  aspirin  chewable 81 milliGRAM(s) Oral daily  atorvastatin 80 milliGRAM(s) Oral at bedtime  docusate sodium 100 milliGRAM(s) Oral two times a day  furosemide    Tablet 40 milliGRAM(s) Oral daily  levothyroxine 50 MICROGram(s) Oral daily  metoprolol     tartrate 12.5 milliGRAM(s) Oral two times a day  pantoprazole    Tablet 40 milliGRAM(s) Oral before breakfast  senna 2 Tablet(s) Oral at bedtime          Hemoglobin: 12.4 g/dL (11-18 @ 08:19)  Hemoglobin: 12.8 g/dL (11-17 @ 07:01)            Creatinine Trend: 1.00<--, 1.20<--, 1.43<--, 1.61<--, 1.16<--, 1.23<--    COAGS:           T(C): 36.4 (11-21-17 @ 13:39), Max: 36.7 (11-20-17 @ 21:33)  HR: 74 (11-21-17 @ 13:39) (61 - 78)  BP: 121/51 (11-21-17 @ 13:39) (121/51 - 139/61)  RR: 17 (11-21-17 @ 13:39) (16 - 17)  SpO2: 99% (11-21-17 @ 13:39) (98% - 99%)  Wt(kg): --    I&O's Summary      Appearance: Normal	  HEENT:   Normal oral mucosa, PERRL, EOMI	  Lymphatic: No lymphadenopathy , (-)  edema  Cardiovascular: irreg  S1 S2, No JVD, No murmurs , Peripheral pulses palpable 2+ bilaterally  Respiratory: Lungs clear to auscultation, normal effort 	  Gastrointestinal:  Soft, Non-tender, + BS	      TELEMETRY: 	 off     DIAGNOSTIC TESTING:  [ ] Echocardiogram:  < from: Transthoracic Echocardiogram (11.12.17 @ 07:37) >  CONCLUSIONS:  1. Densly calcified mitral valve leaflet, mitral annulus  calcification. Mild-moderate mitral regurgitation.  2. Calcified trileaflet aortic valve with normal opening.  Mild aortic regurgitation.  3. Aortic Root: 3.3 cm.  4. Mild left atrial enlargement.  5. Moderate concentric left ventricular hypertrophy.  6. Normal Left Ventricular Systolic Function,  (EF = 55 to  60%)  7. Grade II diastolic dysfunction.  8. Normal right atrium.  9. Right ventricle not well visualized.  10. RA Pressure is 10 mm Hg.  11. RV systolic pressure is 39 mm Hg.  12. There is mild tricuspid regurgitation.  13. Small pericardial effusion. No echocardiographic  evidence of pericardial tamponade.  14. Bilateral pleural effusions.    < end of copied text >    [ ]  Catheterization:  [ ] Stress Test:     OTHER: 	        ASSESSMENT/PLAN: 	89y Female hx of Afib, chol, htn, cad, chf, dementia , now resp distress, NSTEMI, chf excab    - Cont Asa, statin BB  - Clinically improved  - doing well on PO lasix  - D/C plan per med    Darin Dailey MD, FACC  [facility], [facility]  [address] [place] [address]  PHONE:  [phone]  BEEPER : [phone]

## 2017-11-21 NOTE — PROGRESS NOTE ADULT - NSHPATTENDINGPLANDISCUSS_GEN_ALL_CORE
NP
resident doctor
NP
resident doctor
icu team on rounds
icu team on rounds
PGY-3
NP
NP on rounds

## 2017-11-21 NOTE — PROGRESS NOTE ADULT - ASSESSMENT
89 yr old female, from NH, H/O A FIB/CHF/COPD/HLD/CAD/dementia, admit hospital for acute respirotary distress/resp failure/shock, admit ICU, S/P BIPAP, IV ABS, elevated BNP/troponin, on heparin drip,cardiology F/U, IV ABS, ICU care, monitor labs. elevated BUN/Cr, IV hydration, monitor labs. patient improved, change  lasix po, safe discharge planning, rehab vs home with home care/VNS, F/U cardiology out patient. Fall precaution

## 2017-11-21 NOTE — PROGRESS NOTE ADULT - PROBLEM SELECTOR PROBLEM 1
Acute respiratory failure with hypoxia and hypercapnia
Acute respiratory failure with hypoxia and hypercapnia
MOE (acute kidney injury)
Acute respiratory failure with hypoxia and hypercapnia

## 2017-11-21 NOTE — PROGRESS NOTE ADULT - PROBLEM SELECTOR PLAN 5
c/w synthroid.
resolved
c/w synthroid.
supplemental 02 as needed  solumedrol taper
c/w synthroid.

## 2017-11-21 NOTE — PROGRESS NOTE ADULT - ATTENDING COMMENTS
89 female with hx of CHFpEF, COPD, afib, admitted with NSTEMI and acute respiratory failure requiring bipap likely due to CHF exacerbation.  Now doing better, awaiting discharge.

## 2017-11-21 NOTE — PROGRESS NOTE ADULT - PROBLEM SELECTOR PROBLEM 4
COPD (chronic obstructive pulmonary disease)
COPD (chronic obstructive pulmonary disease)
Prophylactic measure
Atrial fibrillation
COPD (chronic obstructive pulmonary disease)

## 2017-11-21 NOTE — PROGRESS NOTE ADULT - PROBLEM SELECTOR PROBLEM 2
NSTEMI (non-ST elevated myocardial infarction)
CHF exacerbation
NSTEMI (non-ST elevated myocardial infarction)

## 2017-11-21 NOTE — PROGRESS NOTE ADULT - PROBLEM SELECTOR PROBLEM 3
NSTEMI (non-ST elevated myocardial infarction)
Atrial fibrillation, unspecified type
CHF exacerbation

## 2017-11-21 NOTE — PROGRESS NOTE ADULT - PROBLEM SELECTOR PLAN 6
c/w supportive care.
c/w supportive care
c/w supportive care
c/w supportive care.
c/w supportive care.

## 2017-11-21 NOTE — PROGRESS NOTE ADULT - SUBJECTIVE AND OBJECTIVE BOX
DNR [x ]   DNI  [x ]    INTERVAL HPI/OVERNIGHT EVENTS: No events overnight  PRESSORS: [ ] YES [ x] NO  WHICH:      Cardiovascular:  Heart Failure  Acute   Acute on Chronic  Chronic       furosemide    Tablet 40 milliGRAM(s) Oral daily  metoprolol     tartrate 12.5 milliGRAM(s) Oral two times a day    Pulmonary:    Hematalogic:  apixaban 2.5 milliGRAM(s) Oral every 12 hours  aspirin  chewable 81 milliGRAM(s) Oral daily    Other:  acetaminophen   Tablet. 650 milliGRAM(s) Oral every 6 hours PRN  atorvastatin 80 milliGRAM(s) Oral at bedtime  docusate sodium 100 milliGRAM(s) Oral two times a day  levothyroxine 50 MICROGram(s) Oral daily  pantoprazole    Tablet 40 milliGRAM(s) Oral before breakfast  senna 2 Tablet(s) Oral at bedtime    acetaminophen   Tablet. 650 milliGRAM(s) Oral every 6 hours PRN  apixaban 2.5 milliGRAM(s) Oral every 12 hours  aspirin  chewable 81 milliGRAM(s) Oral daily  atorvastatin 80 milliGRAM(s) Oral at bedtime  docusate sodium 100 milliGRAM(s) Oral two times a day  furosemide    Tablet 40 milliGRAM(s) Oral daily  levothyroxine 50 MICROGram(s) Oral daily  metoprolol     tartrate 12.5 milliGRAM(s) Oral two times a day  pantoprazole    Tablet 40 milliGRAM(s) Oral before breakfast  senna 2 Tablet(s) Oral at bedtime    Drug Dosing Weight    Weight (kg): 70.3 (12 Nov 2017 04:29)    CENTRAL LINE: [ ] YES [ ] NO  LOCATION:   DATE INSERTED:  REMOVE: [ ] YES [ ] NO  EXPLAIN:    FLAHERTY: [ ] YES [ ] NO    DATE INSERTED:  REMOVE:  [ ] YES [ ] NO  EXPLAIN:    A-LINE:  [ ] YES [ ] NO  LOCATION:   DATE INSERTED:  REMOVE:  [ ] YES [ ] NO  EXPLAIN:    PAST MEDICAL & SURGICAL HISTORY:  Hypothyroidism  HLD (hyperlipidemia)  Osteoporosis  Dementia  HTN (hypertension)  Heart failure  NSTEMI (non-ST elevated myocardial infarction)  Atrial fibrillation  COPD (chronic obstructive pulmonary disease)  No significant past surgical history                    PHYSICAL EXAM:    GENERAL: NAD, well-groomed, well-developed  HEAD:  Atraumatic, Normocephalic  EYES: EOMI, PERRLA, conjunctiva and sclera clear  ENMT: No tonsillar erythema, exudates, or enlargement; Moist mucous membranes, Good dentition, No lesions  NECK: Supple, No JVD, Normal thyroid  NERVOUS SYSTEM:  Alert & Oriented X3, Good concentration; Motor Strength 5/5 B/L upper and lower extremities; DTRs 2+ intact and symmetric  CHEST/LUNG: Clear to percussion bilaterally; No rales, rhonchi, wheezing, or rubs  HEART: Regular rate and rhythm; No murmurs, rubs, or gallops  ABDOMEN: Soft, Nontender, Nondistended; Bowel sounds present  EXTREMITIES:  2+ Peripheral Pulses, No clubbing, cyanosis, or edema  LYMPH: No lymphadenopathy noted  SKIN: No rashes or lesions      LABS:                    [  ]  DVT Prophylaxis  [  ]  Nutrition, Brand, Rate         Goal Rate         Abdominal Nutritional Status -  Malnutrition   Cachexia      Morbid Obesity BMI >/=40    RADIOLOGY & ADDITIONAL STUDIES:  ***    [  ] Goals of Care Discussion with Family/Proxy/Other           Elements of Conversation Discussed: Patient/Family understanding of current illness   Advanced Directives                                                                       Prognosis  Treatment Options  Care Aligned with patient's wishes                                             TIME SPENT: 35 minutes DNR [x ]   DNI  [x ]    INTERVAL HPI/OVERNIGHT EVENTS: No events overnight  PRESSORS: [ ] YES [ x] NO  WHICH:      Cardiovascular:  Heart Failure  Acute   Acute on Chronic  Chronic       furosemide    Tablet 40 milliGRAM(s) Oral daily  metoprolol     tartrate 12.5 milliGRAM(s) Oral two times a day    Pulmonary:    Hematalogic:  apixaban 2.5 milliGRAM(s) Oral every 12 hours  aspirin  chewable 81 milliGRAM(s) Oral daily    Other:  acetaminophen   Tablet. 650 milliGRAM(s) Oral every 6 hours PRN  atorvastatin 80 milliGRAM(s) Oral at bedtime  docusate sodium 100 milliGRAM(s) Oral two times a day  levothyroxine 50 MICROGram(s) Oral daily  pantoprazole    Tablet 40 milliGRAM(s) Oral before breakfast  senna 2 Tablet(s) Oral at bedtime    acetaminophen   Tablet. 650 milliGRAM(s) Oral every 6 hours PRN  apixaban 2.5 milliGRAM(s) Oral every 12 hours  aspirin  chewable 81 milliGRAM(s) Oral daily  atorvastatin 80 milliGRAM(s) Oral at bedtime  docusate sodium 100 milliGRAM(s) Oral two times a day  furosemide    Tablet 40 milliGRAM(s) Oral daily  levothyroxine 50 MICROGram(s) Oral daily  metoprolol     tartrate 12.5 milliGRAM(s) Oral two times a day  pantoprazole    Tablet 40 milliGRAM(s) Oral before breakfast  senna 2 Tablet(s) Oral at bedtime    Drug Dosing Weight    Weight (kg): 70.3 (12 Nov 2017 04:29)    CENTRAL LINE: [ ] YES [ ] NO  LOCATION:   DATE INSERTED:  REMOVE: [ ] YES [ ] NO  EXPLAIN:    FLAHERTY: [ ] YES [ ] NO    DATE INSERTED:  REMOVE:  [ ] YES [ ] NO  EXPLAIN:    A-LINE:  [ ] YES [ ] NO  LOCATION:   DATE INSERTED:  REMOVE:  [ ] YES [ ] NO  EXPLAIN:    PAST MEDICAL & SURGICAL HISTORY:  Hypothyroidism  HLD (hyperlipidemia)  Osteoporosis  Dementia  HTN (hypertension)  Heart failure  NSTEMI (non-ST elevated myocardial infarction)  Atrial fibrillation  COPD (chronic obstructive pulmonary disease)  No significant past surgical history                    PHYSICAL EXAM:    GENERAL: NAD, well-groomed, well-developed  HEAD:  Atraumatic, Normocephalic  EYES: EOMI, PERRLA, conjunctiva and sclera clear  ENMT: No tonsillar erythema, exudates, or enlargement; Moist mucous membranes, Good dentition, No lesions  NECK: Supple, No JVD, Normal thyroid  NERVOUS SYSTEM:  Alert & Oriented with periods of confusion   CHEST/LUNG: Clear to percussion bilaterally; No rales, rhonchi, wheezing, or rubs  HEART: Regular rate and rhythm; No murmurs, rubs, or gallops  ABDOMEN: Soft, Nontender, Nondistended; Bowel sounds present  EXTREMITIES:  2+ Peripheral Pulses, No clubbing, cyanosis, or edema  LYMPH: No lymphadenopathy noted  SKIN: No rashes or lesions      LABS:                    [  ]  DVT Prophylaxis  [  ]  Nutrition, Brand, Rate         Goal Rate         Abdominal Nutritional Status -  Malnutrition   Cachexia      Morbid Obesity BMI >/=40    RADIOLOGY & ADDITIONAL STUDIES:  ***    [  ] Goals of Care Discussion with Family/Proxy/Other           Elements of Conversation Discussed: Patient/Family understanding of current illness   Advanced Directives                                                                       Prognosis  Treatment Options  Care Aligned with patient's wishes                                             TIME SPENT: 35 minutes

## 2017-11-21 NOTE — PROGRESS NOTE ADULT - PROBLEM SELECTOR PROBLEM 7
Atrial fibrillation
Hypothyroidism
Atrial fibrillation

## 2017-11-21 NOTE — PROGRESS NOTE ADULT - PROBLEM SELECTOR PROBLEM 5
Hypothyroidism
MOE (acute kidney injury)
COPD (chronic obstructive pulmonary disease)
Hypothyroidism
Hypothyroidism

## 2017-11-21 NOTE — PROGRESS NOTE ADULT - PROBLEM SELECTOR PLAN 4
supplemental 02 as needed  prednisone  taper.
supplemental 02 as needed  prednisone  taper.
completed steroid taper
IMPROVE score 2, on heparin drip
c/w Eliquis  c/w lopressor  Dig remains on hold 2/2 elevated level, however level is trending down (3.6 today, 4.8 yesterday)
supplemental 02 as needed  prednisone  taper.

## 2017-11-21 NOTE — PROGRESS NOTE ADULT - SUBJECTIVE AND OBJECTIVE BOX
Patient is a 89y old  Female who presents with a chief complaint of shortness of breath/CHF exac/NTEMI, improved, pending D/C      INTERVAL HPI/OVERNIGHT EVENTS:  T(C): 36.7 (11-21-17 @ 05:38), Max: 36.7 (11-20-17 @ 13:00)  HR: 61 (11-21-17 @ 05:38) (61 - 130)  BP: 130/64 (11-21-17 @ 05:38) (118/70 - 139/61)  RR: 16 (11-21-17 @ 05:38) (16 - 16)  SpO2: 98% (11-21-17 @ 05:38) (97% - 99%)  Wt(kg): --    LABS:              CAPILLARY BLOOD GLUCOSE            RADIOLOGY & ADDITIONAL TESTS:    Consultant(s) Notes Reviewed:  [x ] YES  [ ] NO    PHYSICAL EXAM:  GENERAL: well built, well nourished  HEAD:  Atraumatic, Normocephalic  EYES: EOMI, PERRLA, conjunctiva and sclera clear  ENT: No tonsillar erythema, exudates, or enlargement; Moist mucous membranes, Good dentition, No lesions  NECK: Supple, No JVD, Normal thyroid, no enlarged nodes  NERVOUS SYSTEM:  Alert & Oriented X3, Good concentration; Motor Strength 5/5 B/L upper and lower extremities; DTRs 2+ intact and symmetric, sensory intact  CHEST/LUNG: B/L good air entry; No rales, rhonchi, or wheezing  HEART: S1S2 normal, no S3, Regular rate and rhythm; No murmurs, rubs, or gallops  ABDOMEN: Soft, Nontender, Nondistended; Bowel sounds present  EXTREMITIES:  2+ Peripheral Pulses, No clubbing, cyanosis, or edema  LYMPH: No lymphadenopathy noted  SKIN: No rashes or lesions    Care Discussed with Consultants/Other Providers [ x] YES  [ ] NO

## 2017-11-22 VITALS
HEART RATE: 59 BPM | DIASTOLIC BLOOD PRESSURE: 56 MMHG | WEIGHT: 112.88 LBS | RESPIRATION RATE: 16 BRPM | OXYGEN SATURATION: 95 % | SYSTOLIC BLOOD PRESSURE: 124 MMHG | TEMPERATURE: 98 F

## 2017-11-22 RX ORDER — SIMVASTATIN 20 MG/1
0.5 TABLET, FILM COATED ORAL
Qty: 0 | Refills: 0 | COMMUNITY
Start: 2017-11-22 | End: 2017-12-22

## 2017-11-22 RX ORDER — ASCORBIC ACID 60 MG
1 TABLET,CHEWABLE ORAL
Qty: 0 | Refills: 0 | COMMUNITY

## 2017-11-22 RX ORDER — PANTOPRAZOLE SODIUM 20 MG/1
1 TABLET, DELAYED RELEASE ORAL
Qty: 30 | Refills: 0 | OUTPATIENT
Start: 2017-11-22 | End: 2017-12-22

## 2017-11-22 RX ORDER — METOPROLOL TARTRATE 50 MG
0.5 TABLET ORAL
Qty: 30 | Refills: 0 | OUTPATIENT
Start: 2017-11-22 | End: 2017-12-22

## 2017-11-22 RX ORDER — LEVOTHYROXINE SODIUM 125 MCG
1 TABLET ORAL
Qty: 0 | Refills: 0 | COMMUNITY

## 2017-11-22 RX ORDER — SIMVASTATIN 20 MG/1
1 TABLET, FILM COATED ORAL
Qty: 30 | Refills: 0 | OUTPATIENT
Start: 2017-11-22 | End: 2017-12-22

## 2017-11-22 RX ORDER — APIXABAN 2.5 MG/1
1 TABLET, FILM COATED ORAL
Qty: 60 | Refills: 0 | OUTPATIENT
Start: 2017-11-22 | End: 2017-12-22

## 2017-11-22 RX ORDER — ASPIRIN/CALCIUM CARB/MAGNESIUM 324 MG
1 TABLET ORAL
Qty: 0 | Refills: 0 | COMMUNITY

## 2017-11-22 RX ORDER — FLUTICASONE PROPIONATE 50 MCG
1 SPRAY, SUSPENSION NASAL
Qty: 0 | Refills: 0 | COMMUNITY

## 2017-11-22 RX ORDER — ASCORBIC ACID 60 MG
1 TABLET,CHEWABLE ORAL
Qty: 30 | Refills: 0 | OUTPATIENT
Start: 2017-11-22 | End: 2017-12-22

## 2017-11-22 RX ORDER — FUROSEMIDE 40 MG
1 TABLET ORAL
Qty: 30 | Refills: 0 | OUTPATIENT
Start: 2017-11-22 | End: 2017-12-22

## 2017-11-22 RX ORDER — SENNA PLUS 8.6 MG/1
2 TABLET ORAL
Qty: 60 | Refills: 0 | OUTPATIENT
Start: 2017-11-22 | End: 2017-12-22

## 2017-11-22 RX ORDER — FLUTICASONE PROPIONATE 50 MCG
1 SPRAY, SUSPENSION NASAL
Qty: 1 | Refills: 0 | OUTPATIENT
Start: 2017-11-22 | End: 2017-12-22

## 2017-11-22 RX ORDER — ASPIRIN/CALCIUM CARB/MAGNESIUM 324 MG
1 TABLET ORAL
Qty: 30 | Refills: 0 | OUTPATIENT
Start: 2017-11-22 | End: 2017-12-22

## 2017-11-22 RX ORDER — DOCUSATE SODIUM 100 MG
1 CAPSULE ORAL
Qty: 60 | Refills: 0 | OUTPATIENT
Start: 2017-11-22 | End: 2017-12-22

## 2017-11-22 RX ORDER — APIXABAN 2.5 MG/1
1 TABLET, FILM COATED ORAL
Qty: 0 | Refills: 0 | COMMUNITY

## 2017-11-22 RX ADMIN — Medication 12.5 MILLIGRAM(S): at 06:21

## 2017-11-22 RX ADMIN — APIXABAN 2.5 MILLIGRAM(S): 2.5 TABLET, FILM COATED ORAL at 06:21

## 2017-11-22 RX ADMIN — PANTOPRAZOLE SODIUM 40 MILLIGRAM(S): 20 TABLET, DELAYED RELEASE ORAL at 06:21

## 2017-11-22 RX ADMIN — Medication 40 MILLIGRAM(S): at 06:21

## 2017-11-22 RX ADMIN — Medication 50 MICROGRAM(S): at 06:21

## 2017-11-22 RX ADMIN — Medication 100 MILLIGRAM(S): at 06:21

## 2017-11-22 NOTE — PROGRESS NOTE ADULT - PROVIDER SPECIALTY LIST ADULT
Cardiology
Critical Care
Infectious Disease
Internal Medicine
MICU
Nephrology
Cardiology
Critical Care
Internal Medicine
Internal Medicine
Critical Care

## 2017-11-22 NOTE — PROGRESS NOTE ADULT - ATTENDING COMMENTS
Patient seen and examined, agree with above assessment and plan as transcribed above.    - D/C planned for today  - Cont medical management  - F/u with me in office 1-2 weeks    Darin Dailey MD, [facility], [facility]  [address] [place] [address]  PHONE:  [phone]  BEEPER : [phone]

## 2017-12-19 PROCEDURE — 96374 THER/PROPH/DIAG INJ IV PUSH: CPT

## 2017-12-19 PROCEDURE — 83605 ASSAY OF LACTIC ACID: CPT

## 2017-12-19 PROCEDURE — 83880 ASSAY OF NATRIURETIC PEPTIDE: CPT

## 2017-12-19 PROCEDURE — 85027 COMPLETE CBC AUTOMATED: CPT

## 2017-12-19 PROCEDURE — 81001 URINALYSIS AUTO W/SCOPE: CPT

## 2017-12-19 PROCEDURE — 80162 ASSAY OF DIGOXIN TOTAL: CPT

## 2017-12-19 PROCEDURE — 94760 N-INVAS EAR/PLS OXIMETRY 1: CPT

## 2017-12-19 PROCEDURE — P9047: CPT

## 2017-12-19 PROCEDURE — 94660 CPAP INITIATION&MGMT: CPT

## 2017-12-19 PROCEDURE — 93005 ELECTROCARDIOGRAM TRACING: CPT

## 2017-12-19 PROCEDURE — 94640 AIRWAY INHALATION TREATMENT: CPT

## 2017-12-19 PROCEDURE — 97116 GAIT TRAINING THERAPY: CPT

## 2017-12-19 PROCEDURE — 80202 ASSAY OF VANCOMYCIN: CPT

## 2017-12-19 PROCEDURE — 80048 BASIC METABOLIC PNL TOTAL CA: CPT

## 2017-12-19 PROCEDURE — 83735 ASSAY OF MAGNESIUM: CPT

## 2017-12-19 PROCEDURE — 93306 TTE W/DOPPLER COMPLETE: CPT

## 2017-12-19 PROCEDURE — 96375 TX/PRO/DX INJ NEW DRUG ADDON: CPT

## 2017-12-19 PROCEDURE — 84443 ASSAY THYROID STIM HORMONE: CPT

## 2017-12-19 PROCEDURE — 84145 PROCALCITONIN (PCT): CPT

## 2017-12-19 PROCEDURE — 82553 CREATINE MB FRACTION: CPT

## 2017-12-19 PROCEDURE — 84100 ASSAY OF PHOSPHORUS: CPT

## 2017-12-19 PROCEDURE — 82550 ASSAY OF CK (CPK): CPT

## 2017-12-19 PROCEDURE — 84484 ASSAY OF TROPONIN QUANT: CPT

## 2017-12-19 PROCEDURE — 97530 THERAPEUTIC ACTIVITIES: CPT

## 2017-12-19 PROCEDURE — 87040 BLOOD CULTURE FOR BACTERIA: CPT

## 2017-12-19 PROCEDURE — 85730 THROMBOPLASTIN TIME PARTIAL: CPT

## 2017-12-19 PROCEDURE — 85610 PROTHROMBIN TIME: CPT

## 2017-12-19 PROCEDURE — 99291 CRITICAL CARE FIRST HOUR: CPT | Mod: 25

## 2017-12-19 PROCEDURE — 80053 COMPREHEN METABOLIC PANEL: CPT

## 2017-12-19 PROCEDURE — 87086 URINE CULTURE/COLONY COUNT: CPT

## 2017-12-19 PROCEDURE — 71045 X-RAY EXAM CHEST 1 VIEW: CPT

## 2017-12-19 PROCEDURE — 82803 BLOOD GASES ANY COMBINATION: CPT

## 2018-02-27 ENCOUNTER — INPATIENT (INPATIENT)
Facility: HOSPITAL | Age: 83
LOS: 8 days | Discharge: NURSING, REHAB OR LONG-TERM CARE | DRG: 884 | End: 2018-03-08
Attending: INTERNAL MEDICINE | Admitting: INTERNAL MEDICINE
Payer: MEDICARE

## 2018-02-27 VITALS
OXYGEN SATURATION: 99 % | HEART RATE: 99 BPM | WEIGHT: 100.09 LBS | SYSTOLIC BLOOD PRESSURE: 156 MMHG | RESPIRATION RATE: 19 BRPM | TEMPERATURE: 98 F | DIASTOLIC BLOOD PRESSURE: 84 MMHG

## 2018-02-27 NOTE — ED ADULT TRIAGE NOTE - CCCP TRG CHIEF CMPLNT
see chief complaint quote see chief complaint quote/pt refused to go to sleep/refused to take her meds as per ems

## 2018-02-27 NOTE — ED PROVIDER NOTE - PROGRESS NOTE DETAILS
spoke w/ son who said pt has history of dementia that is progressively worsening where pt has severe episodes of agitation every 3 days. Pt's son doesn't feel she is safe to be living at home; pt fell at home 3 times today. Pt has history of Afib and is on Eliquis. Per son pt did not hit her head. Pt w/ no LOC, no recent fevers or illnesses that he knows of.

## 2018-02-27 NOTE — ED PROVIDER NOTE - OBJECTIVE STATEMENT
89 y/o F pt w/ PMHx of Hypothyroid, HTN, HLD, NSTEMI, Afib, COPD, and Dementia was BIB EMS to ED c/o AMS today. Pt reports that she was "bad at home" and was not behaving like a normal person; pt states her children called the police. Pt is allergic to Penicillin (Unknown). 89 y/o F pt w/ PMHx of Hypothyroid, HTN, HLD, NSTEMI, Afib, COPD, and Dementia was BIB EMS to ED c/o AMS today. Pt reports that she was "bad at home" and was not behaving like a normal person; pt states her children called the police. Pt is allergic to Penicillin (Unknown).  spoke w/ son who said pt has history of dementia that is progressively worsening where pt has severe episodes of agitation every 3 days. Pt's son doesn't feel she is safe to be living at home; pt fell at home 3 times today. Pt has history of Afib and is on Eliquis. Per son pt did not hit her head. Pt w/ no LOC, no recent fevers or illnesses that he knows of.

## 2018-02-27 NOTE — ED ADULT NURSE NOTE - ED STAT RN HANDOFF DETAILS
Received patient from NATALIE Rausch patient confused non compliant ,patient resting continue to monitor patient.

## 2018-02-27 NOTE — ED ADULT NURSE NOTE - OBJECTIVE STATEMENT
Pt c/o of bodyaches, and leg pain, says she was brought here because they think she's crazy. Pt is breathing room air, in no respiratory distress. Nursing monitoring continues.

## 2018-02-27 NOTE — ED ADULT NURSE NOTE - ED STAT RN HANDOFF DETAILS 2
Patient admitted to medicine assigned to bed 517 report given to RN. Patient to be transported via stretcher by transporter via stretcher stable in no acute distress safety maintained.

## 2018-02-27 NOTE — ED PROVIDER NOTE - MEDICAL DECISION MAKING DETAILS
89 y/o F pt w/ worsening dementia. Will get EKG, labs, CXR, CT head, urine, admit, possible nursing home placement 89 y/o F pt w/ worsening dementia w agitation/ freq falls. Will get EKG, labs, CXR, CT head, urine, admit, possible nursing home placement

## 2018-02-28 DIAGNOSIS — G93.40 ENCEPHALOPATHY, UNSPECIFIED: ICD-10-CM

## 2018-02-28 DIAGNOSIS — N18.3 CHRONIC KIDNEY DISEASE, STAGE 3 (MODERATE): ICD-10-CM

## 2018-02-28 DIAGNOSIS — I10 ESSENTIAL (PRIMARY) HYPERTENSION: ICD-10-CM

## 2018-02-28 DIAGNOSIS — Z29.9 ENCOUNTER FOR PROPHYLACTIC MEASURES, UNSPECIFIED: ICD-10-CM

## 2018-02-28 DIAGNOSIS — J44.9 CHRONIC OBSTRUCTIVE PULMONARY DISEASE, UNSPECIFIED: ICD-10-CM

## 2018-02-28 DIAGNOSIS — I48.91 UNSPECIFIED ATRIAL FIBRILLATION: ICD-10-CM

## 2018-02-28 DIAGNOSIS — Z71.89 OTHER SPECIFIED COUNSELING: ICD-10-CM

## 2018-02-28 DIAGNOSIS — F03.90 UNSPECIFIED DEMENTIA WITHOUT BEHAVIORAL DISTURBANCE: ICD-10-CM

## 2018-02-28 DIAGNOSIS — I25.10 ATHEROSCLEROTIC HEART DISEASE OF NATIVE CORONARY ARTERY WITHOUT ANGINA PECTORIS: ICD-10-CM

## 2018-02-28 DIAGNOSIS — E03.9 HYPOTHYROIDISM, UNSPECIFIED: ICD-10-CM

## 2018-02-28 LAB
ALBUMIN SERPL ELPH-MCNC: 3.4 G/DL — LOW (ref 3.5–5)
ALP SERPL-CCNC: 76 U/L — SIGNIFICANT CHANGE UP (ref 40–120)
ALT FLD-CCNC: 26 U/L DA — SIGNIFICANT CHANGE UP (ref 10–60)
ANION GAP SERPL CALC-SCNC: 9 MMOL/L — SIGNIFICANT CHANGE UP (ref 5–17)
APPEARANCE UR: CLEAR — SIGNIFICANT CHANGE UP
APTT BLD: 36.5 SEC — SIGNIFICANT CHANGE UP (ref 27.5–37.4)
AST SERPL-CCNC: 21 U/L — SIGNIFICANT CHANGE UP (ref 10–40)
BASOPHILS # BLD AUTO: 0.1 K/UL — SIGNIFICANT CHANGE UP (ref 0–0.2)
BASOPHILS NFR BLD AUTO: 0.7 % — SIGNIFICANT CHANGE UP (ref 0–2)
BILIRUB SERPL-MCNC: 0.4 MG/DL — SIGNIFICANT CHANGE UP (ref 0.2–1.2)
BILIRUB UR-MCNC: NEGATIVE — SIGNIFICANT CHANGE UP
BUN SERPL-MCNC: 26 MG/DL — HIGH (ref 7–18)
CALCIUM SERPL-MCNC: 9.7 MG/DL — SIGNIFICANT CHANGE UP (ref 8.4–10.5)
CHLORIDE SERPL-SCNC: 104 MMOL/L — SIGNIFICANT CHANGE UP (ref 96–108)
CO2 SERPL-SCNC: 27 MMOL/L — SIGNIFICANT CHANGE UP (ref 22–31)
COLOR SPEC: YELLOW — SIGNIFICANT CHANGE UP
CREAT SERPL-MCNC: 1.14 MG/DL — SIGNIFICANT CHANGE UP (ref 0.5–1.3)
DIFF PNL FLD: NEGATIVE — SIGNIFICANT CHANGE UP
EOSINOPHIL # BLD AUTO: 0 K/UL — SIGNIFICANT CHANGE UP (ref 0–0.5)
EOSINOPHIL NFR BLD AUTO: 0.4 % — SIGNIFICANT CHANGE UP (ref 0–6)
GLUCOSE SERPL-MCNC: 119 MG/DL — HIGH (ref 70–99)
GLUCOSE UR QL: NEGATIVE — SIGNIFICANT CHANGE UP
HCT VFR BLD CALC: 37.9 % — SIGNIFICANT CHANGE UP (ref 34.5–45)
HGB BLD-MCNC: 12.6 G/DL — SIGNIFICANT CHANGE UP (ref 11.5–15.5)
INR BLD: 1.07 RATIO — SIGNIFICANT CHANGE UP (ref 0.88–1.16)
KETONES UR-MCNC: NEGATIVE — SIGNIFICANT CHANGE UP
LEUKOCYTE ESTERASE UR-ACNC: ABNORMAL
LYMPHOCYTES # BLD AUTO: 1.2 K/UL — SIGNIFICANT CHANGE UP (ref 1–3.3)
LYMPHOCYTES # BLD AUTO: 12.4 % — LOW (ref 13–44)
MCHC RBC-ENTMCNC: 31.6 PG — SIGNIFICANT CHANGE UP (ref 27–34)
MCHC RBC-ENTMCNC: 33.3 GM/DL — SIGNIFICANT CHANGE UP (ref 32–36)
MCV RBC AUTO: 94.8 FL — SIGNIFICANT CHANGE UP (ref 80–100)
MONOCYTES # BLD AUTO: 0.8 K/UL — SIGNIFICANT CHANGE UP (ref 0–0.9)
MONOCYTES NFR BLD AUTO: 8 % — SIGNIFICANT CHANGE UP (ref 2–14)
NEUTROPHILS # BLD AUTO: 7.5 K/UL — HIGH (ref 1.8–7.4)
NEUTROPHILS NFR BLD AUTO: 78.5 % — HIGH (ref 43–77)
NITRITE UR-MCNC: NEGATIVE — SIGNIFICANT CHANGE UP
PH UR: 6.5 — SIGNIFICANT CHANGE UP (ref 5–8)
PLATELET # BLD AUTO: 175 K/UL — SIGNIFICANT CHANGE UP (ref 150–400)
POTASSIUM SERPL-MCNC: 3.4 MMOL/L — LOW (ref 3.5–5.3)
POTASSIUM SERPL-SCNC: 3.4 MMOL/L — LOW (ref 3.5–5.3)
PROT SERPL-MCNC: 7.1 G/DL — SIGNIFICANT CHANGE UP (ref 6–8.3)
PROT UR-MCNC: NEGATIVE — SIGNIFICANT CHANGE UP
PROTHROM AB SERPL-ACNC: 11.7 SEC — SIGNIFICANT CHANGE UP (ref 9.8–12.7)
RAPID RVP RESULT: SIGNIFICANT CHANGE UP
RBC # BLD: 4 M/UL — SIGNIFICANT CHANGE UP (ref 3.8–5.2)
RBC # FLD: 13.7 % — SIGNIFICANT CHANGE UP (ref 10.3–14.5)
SODIUM SERPL-SCNC: 140 MMOL/L — SIGNIFICANT CHANGE UP (ref 135–145)
SP GR SPEC: 1.01 — SIGNIFICANT CHANGE UP (ref 1.01–1.02)
UROBILINOGEN FLD QL: NEGATIVE — SIGNIFICANT CHANGE UP
WBC # BLD: 9.6 K/UL — SIGNIFICANT CHANGE UP (ref 3.8–10.5)
WBC # FLD AUTO: 9.6 K/UL — SIGNIFICANT CHANGE UP (ref 3.8–10.5)

## 2018-02-28 PROCEDURE — 99285 EMERGENCY DEPT VISIT HI MDM: CPT | Mod: 25

## 2018-02-28 PROCEDURE — 73521 X-RAY EXAM HIPS BI 2 VIEWS: CPT | Mod: 26

## 2018-02-28 PROCEDURE — 70450 CT HEAD/BRAIN W/O DYE: CPT | Mod: 26

## 2018-02-28 PROCEDURE — 99053 MED SERV 10PM-8AM 24 HR FAC: CPT

## 2018-02-28 PROCEDURE — 71045 X-RAY EXAM CHEST 1 VIEW: CPT | Mod: 26

## 2018-02-28 PROCEDURE — 72125 CT NECK SPINE W/O DYE: CPT | Mod: 26

## 2018-02-28 RX ORDER — FLUTICASONE PROPIONATE 50 MCG
1 SPRAY, SUSPENSION NASAL DAILY
Qty: 0 | Refills: 0 | Status: DISCONTINUED | OUTPATIENT
Start: 2018-02-28 | End: 2018-03-08

## 2018-02-28 RX ORDER — SENNA PLUS 8.6 MG/1
2 TABLET ORAL AT BEDTIME
Qty: 0 | Refills: 0 | Status: DISCONTINUED | OUTPATIENT
Start: 2018-02-28 | End: 2018-03-08

## 2018-02-28 RX ORDER — FUROSEMIDE 40 MG
40 TABLET ORAL DAILY
Qty: 0 | Refills: 0 | Status: DISCONTINUED | OUTPATIENT
Start: 2018-02-28 | End: 2018-03-05

## 2018-02-28 RX ORDER — DOCUSATE SODIUM 100 MG
100 CAPSULE ORAL
Qty: 0 | Refills: 0 | Status: DISCONTINUED | OUTPATIENT
Start: 2018-02-28 | End: 2018-03-08

## 2018-02-28 RX ORDER — IPRATROPIUM/ALBUTEROL SULFATE 18-103MCG
3 AEROSOL WITH ADAPTER (GRAM) INHALATION EVERY 6 HOURS
Qty: 0 | Refills: 0 | Status: DISCONTINUED | OUTPATIENT
Start: 2018-02-28 | End: 2018-03-08

## 2018-02-28 RX ORDER — APIXABAN 2.5 MG/1
2.5 TABLET, FILM COATED ORAL EVERY 12 HOURS
Qty: 0 | Refills: 0 | Status: DISCONTINUED | OUTPATIENT
Start: 2018-02-28 | End: 2018-03-08

## 2018-02-28 RX ORDER — PANTOPRAZOLE SODIUM 20 MG/1
40 TABLET, DELAYED RELEASE ORAL
Qty: 0 | Refills: 0 | Status: DISCONTINUED | OUTPATIENT
Start: 2018-02-28 | End: 2018-03-08

## 2018-02-28 RX ORDER — ASCORBIC ACID 60 MG
250 TABLET,CHEWABLE ORAL DAILY
Qty: 0 | Refills: 0 | Status: DISCONTINUED | OUTPATIENT
Start: 2018-02-28 | End: 2018-03-08

## 2018-02-28 RX ORDER — METOPROLOL TARTRATE 50 MG
12.5 TABLET ORAL
Qty: 0 | Refills: 0 | Status: DISCONTINUED | OUTPATIENT
Start: 2018-02-28 | End: 2018-03-08

## 2018-02-28 RX ORDER — SIMVASTATIN 20 MG/1
40 TABLET, FILM COATED ORAL AT BEDTIME
Qty: 0 | Refills: 0 | Status: DISCONTINUED | OUTPATIENT
Start: 2018-02-28 | End: 2018-03-08

## 2018-02-28 RX ORDER — CLONAZEPAM 1 MG
0.5 TABLET ORAL AT BEDTIME
Qty: 0 | Refills: 0 | Status: DISCONTINUED | OUTPATIENT
Start: 2018-02-28 | End: 2018-03-07

## 2018-02-28 RX ORDER — LISINOPRIL 2.5 MG/1
5 TABLET ORAL DAILY
Qty: 0 | Refills: 0 | Status: DISCONTINUED | OUTPATIENT
Start: 2018-02-28 | End: 2018-03-04

## 2018-02-28 RX ORDER — ASPIRIN/CALCIUM CARB/MAGNESIUM 324 MG
81 TABLET ORAL DAILY
Qty: 0 | Refills: 0 | Status: DISCONTINUED | OUTPATIENT
Start: 2018-02-28 | End: 2018-03-08

## 2018-02-28 RX ADMIN — Medication 40 MILLIGRAM(S): at 17:47

## 2018-02-28 RX ADMIN — Medication 100 MILLIGRAM(S): at 17:45

## 2018-02-28 RX ADMIN — SENNA PLUS 2 TABLET(S): 8.6 TABLET ORAL at 23:16

## 2018-02-28 RX ADMIN — Medication 2 MILLIGRAM(S): at 00:21

## 2018-02-28 RX ADMIN — LISINOPRIL 5 MILLIGRAM(S): 2.5 TABLET ORAL at 17:48

## 2018-02-28 RX ADMIN — Medication 1 TABLET(S): at 17:49

## 2018-02-28 RX ADMIN — Medication 1 SPRAY(S): at 17:47

## 2018-02-28 RX ADMIN — APIXABAN 2.5 MILLIGRAM(S): 2.5 TABLET, FILM COATED ORAL at 17:44

## 2018-02-28 RX ADMIN — PANTOPRAZOLE SODIUM 40 MILLIGRAM(S): 20 TABLET, DELAYED RELEASE ORAL at 17:48

## 2018-02-28 RX ADMIN — Medication 250 MILLIGRAM(S): at 17:46

## 2018-02-28 RX ADMIN — SIMVASTATIN 40 MILLIGRAM(S): 20 TABLET, FILM COATED ORAL at 23:16

## 2018-02-28 RX ADMIN — Medication 2 MILLIGRAM(S): at 10:19

## 2018-02-28 RX ADMIN — Medication 3 MILLILITER(S): at 20:46

## 2018-02-28 RX ADMIN — Medication 0.5 MILLIGRAM(S): at 23:16

## 2018-02-28 RX ADMIN — Medication 12.5 MILLIGRAM(S): at 17:43

## 2018-02-28 RX ADMIN — Medication 81 MILLIGRAM(S): at 17:47

## 2018-02-28 NOTE — H&P ADULT - PROBLEM SELECTOR PLAN 5
will continue with home dose of lisinopril, lasix and lopressor with parameters   monitor BP closely.

## 2018-02-28 NOTE — H&P ADULT - PROBLEM SELECTOR PLAN 2
CHADvasc score- 5  rate controlled with lopressor 12.5 mg BID  anticoagulation with eliquis 12.5mg twice daily.  EKG- NSR @89BPM , LVH, LAD, no ST T wave changes.   will get cardiology consult DR Dailey.  Need to assess if patient is a candidate to continue with anticoagulation due to hx of recurrent falls.

## 2018-02-28 NOTE — H&P ADULT - ASSESSMENT
89 y/o F from home, lives with son, PMHx of Hypothyroid, HTN, HLD, CAD, Afib, COPD, and Dementia BIB EMS to ED for AMS.     In ED, patient had stable vitals on admission. EKG- NSR @89BPM , LVH, LAD, no ST T wave changes. UA- negative, RVP- negative , CXR s/o No consolidation or infiltrate.  No pleural effusion. Labs pertinent for K of 3.4, CT head and CT cervical spine s/o no acute pathology, 1.2 x 1.0 cm posterior right thyroid lobe hypodense lesion.     will admit to medicine for evaluation of encephalopathy secondary to worsening dementia.

## 2018-02-28 NOTE — H&P ADULT - HISTORY OF PRESENT ILLNESS
89 y/o F from home, lives with son, PMHx of Hypothyroid, HTN, HLD, CAD, Afib, COPD, and Dementia BIB EMS to ED for AMS. Patient is alert, awake, oriented to self, poor historian keeps saying that her back hurts and legs hurt, she was not feeling well for last 3-4 days. History obtained from patient's son Mr. Cage over phone, according to him, patient has been more agitated in the past couple of days and was difficult to take care of. He feels that patient is not  safe living at home as she fell x3 yesterday. He reported that she did not hit her head, no LOC, denies any recent fever/ cough/ diarrhea. Patient was admitted to [facility] ICU in NOV 2017 when she was admitted for new BIPAP for hypoxic and hypercapnic resp failure secondary to CHF exacerbation.    In ED, patient had stable vitals on admission. EKG- NSR @89BPM , LVH, LAD, no ST T wave changes. UA- negative, RVP- negative , CXR s/o No consolidation or infiltrate.  No pleural effusion. Labs pertinent for K of 3.4, CT head and CT cervical spine s/o no acute pathology, 1.2 x 1.0 cm posterior right thyroid lobe hypodense lesion.     will admit to medicine for evaluation of encephalopathy secondary to worsening dementia.

## 2018-02-28 NOTE — H&P ADULT - MUSCULOSKELETAL
detailed exam ROM intact/no joint warmth/no joint erythema/no calf tenderness/normal strength/no joint swelling

## 2018-02-28 NOTE — H&P ADULT - PROBLEM SELECTOR PLAN 7
[] Previous VTE                                                3  [] Thrombophilia                                             2  [] Lower limb paralysis                                   2    [] Current Cancer                                             2   [x] Immobilization > 24 hrs                              1  [] ICU/CCU stay > 24 hours                             1  [x] Age > 60                                                         1    IMPROVE VTE Score: 2, continue with eliquis for DVT prophylaxis  GI ppx with protonix

## 2018-02-28 NOTE — H&P ADULT - PROBLEM SELECTOR PLAN 1
likely secondary to infectious etiology versus worsenig dementia.  patient was found to have  CXR s/o No consolidation or infiltrate.  No pleural effusion., UA negative, RVP negative, abdominal exam beingn except for mild distension, non tender, BS+  fall precautions  hx of multiple falls, will get Xray of hip to r/o fracture  aspiration precautions  will continue with home dose of klonopin 0.5mg HS  social work consult   PT consult for NH placement likely secondary to infectious etiology versus worsenig dementia.  CXR s/o No consolidation or infiltrate.  No pleural effusion., UA negative, RVP negative, abdominal exam beingn except for mild distension, non tender, BS+  fall precautions  hx of multiple falls, will get Xray of hip to r/o fracture  aspiration precautions  will continue with home dose of klonopin 0.5mg HS  social work consult   PT consult for NH placement

## 2018-02-28 NOTE — H&P ADULT - NSHPLABSRESULTS_GEN_ALL_CORE
12.6   9.6   )-----------( 175      ( 2018 00:30 )             37.9           140  |  104  |  26<H>  ----------------------------<  119<H>  3.4<L>   |  27  |  1.14    Ca    9.7      2018 00:30    TPro  7.1  /  Alb  3.4<L>  /  TBili  0.4  /  DBili  x   /  AST  21  /  ALT  26  /  AlkPhos  76                Urinalysis Basic - ( 2018 09:35 )    Color: Yellow / Appearance: Clear / S.015 / pH: x  Gluc: x / Ketone: Negative  / Bili: Negative / Urobili: Negative   Blood: x / Protein: Negative / Nitrite: Negative   Leuk Esterase: Trace / RBC: 0-2 /HPF / WBC 0-2 /HPF   Sq Epi: x / Non Sq Epi: Occasional /HPF / Bacteria: Trace /HPF        PT/INR - ( 2018 00:30 )   PT: 11.7 sec;   INR: 1.07 ratio         PTT - ( 2018 00:30 )  PTT:36.5 sec    CAPILLARY BLOOD GLUCOSE      POCT Blood Glucose.: 119 mg/dL (2018 22:27)  < from: CT Head No Cont (18 @ 04:30) >      IMPRESSION:     CT Head: No acute intracranial hemorrhage or calvarial fracture.    CT cervical spine: No acute cervical spine fracture or evidence of   traumatic malalignment.    1.2 x 1.0 cm posterior right thyroid lobe hypodense lesion. Further   evaluation with nonemergent targeted ultrasound may be obtained as   clinically warranted.    < end of copied text >

## 2018-02-28 NOTE — PATIENT PROFILE ADULT. - FALL HARM RISK
age(85 years old or older) age(85 years old or older)/other/coagulation(Bleeding disorder R/T clinical cond/anti-coags)/h/o falls

## 2018-02-28 NOTE — H&P ADULT - RS GEN PE MLT RESP DETAILS PC
no chest wall tenderness/breath sounds equal/no rales/airway patent/good air movement/clear to auscultation bilaterally/respirations non-labored/no rhonchi

## 2018-02-28 NOTE — H&P ADULT - PROBLEM SELECTOR PLAN 6
patient has CKD stage iii based on GFR with normal baseline creatinine   patient has creatinine of 1.14 on admission  will avoid nephrotoxic medications.  f/u BMP in AM.

## 2018-02-28 NOTE — H&P ADULT - PROBLEM SELECTOR PLAN 4
not in exacerbation  - afebrile, no leukocytosis   - supplemental oxygen prn , duonebs every 6 hours

## 2018-02-28 NOTE — H&P ADULT - PROBLEM SELECTOR PLAN 3
ekg- NSR @89BPM , LVH, LAD, no ST T wave changes.   will continue with aspirin, statin and lopressor  cardiology consult DR Dailey.

## 2018-03-01 LAB
24R-OH-CALCIDIOL SERPL-MCNC: 39.7 NG/ML — SIGNIFICANT CHANGE UP (ref 30–80)
ANION GAP SERPL CALC-SCNC: 7 MMOL/L — SIGNIFICANT CHANGE UP (ref 5–17)
BASOPHILS # BLD AUTO: 0.1 K/UL — SIGNIFICANT CHANGE UP (ref 0–0.2)
BASOPHILS NFR BLD AUTO: 1 % — SIGNIFICANT CHANGE UP (ref 0–2)
BUN SERPL-MCNC: 23 MG/DL — HIGH (ref 7–18)
CALCIUM SERPL-MCNC: 9.6 MG/DL — SIGNIFICANT CHANGE UP (ref 8.4–10.5)
CHLORIDE SERPL-SCNC: 103 MMOL/L — SIGNIFICANT CHANGE UP (ref 96–108)
CHOLEST SERPL-MCNC: 132 MG/DL — SIGNIFICANT CHANGE UP (ref 10–199)
CO2 SERPL-SCNC: 30 MMOL/L — SIGNIFICANT CHANGE UP (ref 22–31)
CREAT SERPL-MCNC: 0.94 MG/DL — SIGNIFICANT CHANGE UP (ref 0.5–1.3)
CULTURE RESULTS: NO GROWTH — SIGNIFICANT CHANGE UP
EOSINOPHIL # BLD AUTO: 0.1 K/UL — SIGNIFICANT CHANGE UP (ref 0–0.5)
EOSINOPHIL NFR BLD AUTO: 1.6 % — SIGNIFICANT CHANGE UP (ref 0–6)
GLUCOSE SERPL-MCNC: 98 MG/DL — SIGNIFICANT CHANGE UP (ref 70–99)
HBA1C BLD-MCNC: 5.5 % — SIGNIFICANT CHANGE UP (ref 4–5.6)
HCT VFR BLD CALC: 36.5 % — SIGNIFICANT CHANGE UP (ref 34.5–45)
HDLC SERPL-MCNC: 61 MG/DL — SIGNIFICANT CHANGE UP (ref 40–125)
HGB BLD-MCNC: 12.2 G/DL — SIGNIFICANT CHANGE UP (ref 11.5–15.5)
LIPID PNL WITH DIRECT LDL SERPL: 56 MG/DL — SIGNIFICANT CHANGE UP
LYMPHOCYTES # BLD AUTO: 1.3 K/UL — SIGNIFICANT CHANGE UP (ref 1–3.3)
LYMPHOCYTES # BLD AUTO: 15.1 % — SIGNIFICANT CHANGE UP (ref 13–44)
MAGNESIUM SERPL-MCNC: 2.5 MG/DL — SIGNIFICANT CHANGE UP (ref 1.6–2.6)
MCHC RBC-ENTMCNC: 32 PG — SIGNIFICANT CHANGE UP (ref 27–34)
MCHC RBC-ENTMCNC: 33.3 GM/DL — SIGNIFICANT CHANGE UP (ref 32–36)
MCV RBC AUTO: 96.2 FL — SIGNIFICANT CHANGE UP (ref 80–100)
MONOCYTES # BLD AUTO: 0.7 K/UL — SIGNIFICANT CHANGE UP (ref 0–0.9)
MONOCYTES NFR BLD AUTO: 8.4 % — SIGNIFICANT CHANGE UP (ref 2–14)
NEUTROPHILS # BLD AUTO: 6.4 K/UL — SIGNIFICANT CHANGE UP (ref 1.8–7.4)
NEUTROPHILS NFR BLD AUTO: 73.8 % — SIGNIFICANT CHANGE UP (ref 43–77)
PHOSPHATE SERPL-MCNC: 4.3 MG/DL — SIGNIFICANT CHANGE UP (ref 2.5–4.5)
PLATELET # BLD AUTO: 172 K/UL — SIGNIFICANT CHANGE UP (ref 150–400)
POTASSIUM SERPL-MCNC: 3.3 MMOL/L — LOW (ref 3.5–5.3)
POTASSIUM SERPL-SCNC: 3.3 MMOL/L — LOW (ref 3.5–5.3)
RBC # BLD: 3.8 M/UL — SIGNIFICANT CHANGE UP (ref 3.8–5.2)
RBC # FLD: 13.7 % — SIGNIFICANT CHANGE UP (ref 10.3–14.5)
SODIUM SERPL-SCNC: 140 MMOL/L — SIGNIFICANT CHANGE UP (ref 135–145)
SPECIMEN SOURCE: SIGNIFICANT CHANGE UP
T4 AB SER-ACNC: 8.4 UG/DL — SIGNIFICANT CHANGE UP (ref 4.6–12)
TOTAL CHOLESTEROL/HDL RATIO MEASUREMENT: 2.2 RATIO — LOW (ref 3.3–7.1)
TRIGL SERPL-MCNC: 76 MG/DL — SIGNIFICANT CHANGE UP (ref 10–149)
TSH SERPL-MCNC: 11.6 UU/ML — HIGH (ref 0.34–4.82)
VIT B12 SERPL-MCNC: 744 PG/ML — SIGNIFICANT CHANGE UP (ref 232–1245)
WBC # BLD: 8.6 K/UL — SIGNIFICANT CHANGE UP (ref 3.8–10.5)
WBC # FLD AUTO: 8.6 K/UL — SIGNIFICANT CHANGE UP (ref 3.8–10.5)

## 2018-03-01 RX ORDER — ACETAMINOPHEN 500 MG
650 TABLET ORAL EVERY 6 HOURS
Qty: 0 | Refills: 0 | Status: DISCONTINUED | OUTPATIENT
Start: 2018-03-01 | End: 2018-03-08

## 2018-03-01 RX ORDER — POTASSIUM CHLORIDE 20 MEQ
40 PACKET (EA) ORAL ONCE
Qty: 0 | Refills: 0 | Status: DISCONTINUED | OUTPATIENT
Start: 2018-03-01 | End: 2018-03-01

## 2018-03-01 RX ORDER — LEVOTHYROXINE SODIUM 125 MCG
50 TABLET ORAL DAILY
Qty: 0 | Refills: 0 | Status: DISCONTINUED | OUTPATIENT
Start: 2018-03-02 | End: 2018-03-08

## 2018-03-01 RX ORDER — HALOPERIDOL DECANOATE 100 MG/ML
0.5 INJECTION INTRAMUSCULAR ONCE
Qty: 0 | Refills: 0 | Status: COMPLETED | OUTPATIENT
Start: 2018-03-01 | End: 2018-03-01

## 2018-03-01 RX ORDER — ACETAMINOPHEN WITH CODEINE 300MG-30MG
2 TABLET ORAL EVERY 4 HOURS
Qty: 0 | Refills: 0 | Status: DISCONTINUED | OUTPATIENT
Start: 2018-03-01 | End: 2018-03-05

## 2018-03-01 RX ORDER — LEVOTHYROXINE SODIUM 125 MCG
50 TABLET ORAL DAILY
Qty: 0 | Refills: 0 | Status: DISCONTINUED | OUTPATIENT
Start: 2018-03-01 | End: 2018-03-01

## 2018-03-01 RX ORDER — POTASSIUM CHLORIDE 20 MEQ
40 PACKET (EA) ORAL ONCE
Qty: 0 | Refills: 0 | Status: COMPLETED | OUTPATIENT
Start: 2018-03-01 | End: 2018-03-01

## 2018-03-01 RX ADMIN — Medication 1 SPRAY(S): at 13:43

## 2018-03-01 RX ADMIN — Medication 1 TABLET(S): at 13:42

## 2018-03-01 RX ADMIN — Medication 81 MILLIGRAM(S): at 13:41

## 2018-03-01 RX ADMIN — Medication 3 MILLILITER(S): at 15:41

## 2018-03-01 RX ADMIN — Medication 100 MILLIGRAM(S): at 18:23

## 2018-03-01 RX ADMIN — APIXABAN 2.5 MILLIGRAM(S): 2.5 TABLET, FILM COATED ORAL at 18:22

## 2018-03-01 RX ADMIN — SIMVASTATIN 40 MILLIGRAM(S): 20 TABLET, FILM COATED ORAL at 22:20

## 2018-03-01 RX ADMIN — Medication 40 MILLIEQUIVALENT(S): at 13:43

## 2018-03-01 RX ADMIN — LISINOPRIL 5 MILLIGRAM(S): 2.5 TABLET ORAL at 05:33

## 2018-03-01 RX ADMIN — PANTOPRAZOLE SODIUM 40 MILLIGRAM(S): 20 TABLET, DELAYED RELEASE ORAL at 05:34

## 2018-03-01 RX ADMIN — Medication 3 MILLILITER(S): at 09:59

## 2018-03-01 RX ADMIN — Medication 250 MILLIGRAM(S): at 13:42

## 2018-03-01 RX ADMIN — SENNA PLUS 2 TABLET(S): 8.6 TABLET ORAL at 22:21

## 2018-03-01 RX ADMIN — Medication 0.5 MILLIGRAM(S): at 22:20

## 2018-03-01 RX ADMIN — Medication 3 MILLILITER(S): at 21:45

## 2018-03-01 RX ADMIN — Medication 12.5 MILLIGRAM(S): at 05:31

## 2018-03-01 RX ADMIN — APIXABAN 2.5 MILLIGRAM(S): 2.5 TABLET, FILM COATED ORAL at 05:33

## 2018-03-01 RX ADMIN — Medication 100 MILLIGRAM(S): at 05:33

## 2018-03-01 RX ADMIN — Medication 40 MILLIGRAM(S): at 05:33

## 2018-03-01 NOTE — PHYSICAL THERAPY INITIAL EVALUATION ADULT - DIAGNOSIS, PT EVAL
Pt has decreased strength, decreased endurance, and pain contributing to decreased functional mobility and activity tolerance

## 2018-03-01 NOTE — PHYSICAL THERAPY INITIAL EVALUATION ADULT - PLANNED THERAPY INTERVENTIONS, PT EVAL
balance training/gait training/bed mobility training/strengthening/neuromuscular re-education/ROM/transfer training

## 2018-03-01 NOTE — CONSULT NOTE ADULT - SUBJECTIVE AND OBJECTIVE BOX
HISTORY OF PRESENT ILLNESS:  89 y/o F from home, lives with son, PMHx of Hypothyroid, HTN, HLD, CAD, Afib, COPD, and Dementia BIB EMS to ED for AMS. Patient is alert, awake, oriented to self, poor historian keeps saying that her back hurts and legs hurt, she was not feeling well for last 3-4 days. History obtained from patient's son Mr. Cage over phone, according to him, patient has been more agitated in the past couple of days and was difficult to take care of. He feels that patient is not  safe living at home as she fell x3 yesterday. He reported that she did not hit her head, no LOC, denies any recent fever/ cough/ diarrhea. Patient was admitted to [facility] ICU in NOV 2017 when she was admitted for new BIPAP for hypoxic and hypercapnic resp failure secondary to CHF exacerbation.    In ED, patient had stable vitals on admission. EKG- NSR @89BPM , LVH, LAD, no ST T wave changes. UA- negative, RVP- negative , CXR s/o No consolidation or infiltrate.  No pleural effusion. Labs pertinent for K of 3.4, CT head and CT cervical spine s/o no acute pathology, 1.2 x 1.0 cm posterior right thyroid lobe hypodense lesion.     will admit to medicine for evaluation of encephalopathy secondary to worsening dementia.    PAST MEDICAL & SURGICAL HISTORY:  Hypothyroidism  HLD (hyperlipidemia)  Osteoporosis  Dementia  HTN (hypertension)  Heart failure  NSTEMI (non-ST elevated myocardial infarction)  Atrial fibrillation  COPD (chronic obstructive pulmonary disease)  No significant past surgical history      [ x] Diabetes   [ x] Hypertension  [ x] Hyperlipidemia  [x ] CAD  [ ] PCI  [ ] CABG    PREVIOUS DIAGNOSTIC TESTING:    [ ] Echocardiogram: < from: Transthoracic Echocardiogram (11.12.17 @ 07:37) >  CONCLUSIONS:  1. Densly calcified mitral valve leaflet, mitral annulus  calcification. Mild-moderate mitral regurgitation.  2. Calcified trileaflet aortic valve with normal opening.  Mild aortic regurgitation.  3. Aortic Root: 3.3 cm.  4. Mild left atrial enlargement.  5. Moderate concentric left ventricular hypertrophy.  6. Normal Left Ventricular Systolic Function,  (EF = 55 to  60%)  7. Grade II diastolic dysfunction.  8. Normal right atrium.  9. Right ventricle not well visualized.  10. RA Pressure is 10 mm Hg.  11. RV systolic pressure is 39 mm Hg.  12. There is mild tricuspid regurgitation.  13. Small pericardial effusion. No echocardiographic  evidence of pericardial tamponade.  14. Bilateral pleural effusions.    < end of copied text >    [ ]  Catheterization:  [ ] Stress Test:  	     MEDICATIONS:  apixaban 2.5 milliGRAM(s) Oral every 12 hours  aspirin enteric coated 81 milliGRAM(s) Oral daily  furosemide    Tablet 40 milliGRAM(s) Oral daily  lisinopril 5 milliGRAM(s) Oral daily  metoprolol     tartrate 12.5 milliGRAM(s) Oral two times a day      ALBUTerol/ipratropium for Nebulization 3 milliLiter(s) Nebulizer every 6 hours    clonazePAM Tablet 0.5 milliGRAM(s) Oral at bedtime    docusate sodium 100 milliGRAM(s) Oral two times a day  pantoprazole    Tablet 40 milliGRAM(s) Oral before breakfast  senna 2 Tablet(s) Oral at bedtime    simvastatin 40 milliGRAM(s) Oral at bedtime    ascorbic acid 250 milliGRAM(s) Oral daily  fluticasone propionate 50 MICROgram(s)/spray Nasal Spray 1 Spray(s) Both Nostrils daily  multivitamin 1 Tablet(s) Oral daily      Allergies    penicillin (Unknown)    Intolerances        FAMILY HISTORY:  No pertinent family history in first degree relatives      SOCIAL HISTORY:    [x ] Non-smoker  [ ] Smoker  [ ] Alcohol      REVIEW OF SYSTEMS:  [ ]chest pain  [x  ]shortness of breath  [  ]palpitations  [  ]syncope  [ ]near syncope [  ]diplopia  [ x ]altered mental status   [  ]fevers  [ ]chills [ ]nausea  [ ]vomitting  [ ]abdominal pain  [ ]melena  [ ]BRBPR  [  ]epistaxis  [  ]rash  [  ]lower extremity edema      CONSTITUTIONAL: No fever, weight loss, or fatigue  EYES: No eye pain, visual disturbances, or discharge  ENMT:  No difficulty hearing, tinnitus, vertigo; No sinus or throat pain  NECK: No pain or stiffness  RESPIRATORY: No cough, wheezing, chills or hemoptysis; No Shortness of Breath  CARDIOVASCULAR: No chest pain, palpitations, passing out, dizziness, or leg swelling  GASTROINTESTINAL: No abdominal or epigastric pain. No nausea, vomiting, or hematemesis; No diarrhea or constipation. No melena or hematochezia.  GENITOURINARY: No dysuria, frequency, hematuria, or incontinence  NEUROLOGICAL: confused , non focal s  SKIN: No itching, burning, rashes, or lesions   LYMPH Nodes: No enlarged glands  ENDOCRINE: No heat or cold intolerance; No hair loss  MUSCULOSKELETAL: No joint pain or swelling; No muscle, back, or extremity pain  PSYCHIATRIC: No depression, anxiety, mood swings, or difficulty sleeping  HEME/LYMPH: No easy bruising, or bleeding gums  ALLERY AND IMMUNOLOGIC: No hives or eczema	    [ ] All others negative	  [ ] Unable to obtain    PHYSICAL EXAM:  T(C): 37.1 (03-01-18 @ 05:06), Max: 37.3 (02-28-18 @ 16:57)  HR: 85 (03-01-18 @ 05:06) (80 - 91)  BP: 140/65 (03-01-18 @ 05:06) (140/65 - 169/70)  RR: 17 (03-01-18 @ 05:06) (17 - 19)  SpO2: 97% (03-01-18 @ 05:06) (94% - 100%)  Wt(kg): --  I&O's Summary      Appearance: Normal	  HEENT:   Normal oral mucosa, PERRL, EOMI	  Lymphatic: No lymphadenopathy  Cardiovascular: Normal S1 S2, No JVD, No murmurs, No edema  Respiratory: Lungs clear to auscultation	  Psychiatry: A & O x 3, Mood & affect appropriate  Gastrointestinal:  Soft, Non-tender, + BS	  Skin: No rashes, No ecchymoses, No cyanosis	  Neurologic: Non-focal  Extremities: Normal range of motion, No clubbing, cyanosis or edema  Vascular: Peripheral pulses palpable 2+ bilaterally    TELEMETRY: 	  afib 80's  ECG:  	Afib 104 , no acute st / T wave changes   RADIOLOGY: < from: Xray Chest 1 View-PORTABLE IMMEDIATE (02.28.18 @ 01:05) >  FINDINGS/  IMPRESSION:  No consolidation or infiltrate.  No pleural effusion.    Heart size cannot be accurately assessed in this projection.    Chronic right rib deformity    < end of copied text >    OTHER: 	  	  LABS:	 	    CARDIAC MARKERS:                                  12.6   9.6   )-----------( 175      ( 28 Feb 2018 00:30 )             37.9     02-28    140  |  104  |  26<H>  ----------------------------<  119<H>  3.4<L>   |  27  |  1.14    Ca    9.7      28 Feb 2018 00:30    TPro  7.1  /  Alb  3.4<L>  /  TBili  0.4  /  DBili  x   /  AST  21  /  ALT  26  /  AlkPhos  76  02-28    proBNP:   Lipid Profile:   HgA1c:   TSH:     ASSESSMENT/PLAN: 89 y/o F from home, lives with son, PMHx of Hypothyroid, HTN, HLD, CAD, Afib, COPD, and Dementia BIB EMS to ED for AMS. Afib with RVR on admission      GI / DVT prophylaxis.   keep K>4, mag >2.0  Cont A/C with eliquis ,   ASA / statin   Rate control with low dose BB, increase as per BP allows   would still cont home dose of Diuretic with lasix po   neuro follow up   tele with rate control Afib now. pt was agitated in ER .   D/W Dr Dailey HISTORY OF PRESENT ILLNESS:  89 y/o F from home, lives with son, PMHx of Hypothyroid, HTN, HLD, CAD, Afib, COPD, and Dementia BIB EMS to ED for AMS. Patient is alert, awake, oriented to self, poor historian keeps saying that her back hurts and legs hurt, she was not feeling well for last 3-4 days. History obtained from patient's son Mr. Cage over phone, according to him, patient has been more agitated in the past couple of days and was difficult to take care of. He feels that patient is not  safe living at home as she fell x3 yesterday. He reported that she did not hit her head, no LOC, denies any recent fever/ cough/ diarrhea. Patient was admitted to [facility] ICU in NOV 2017 when she was admitted for new BIPAP for hypoxic and hypercapnic resp failure secondary to CHF exacerbation.    In ED, patient had stable vitals on admission. EKG- NSR @89BPM , LVH, LAD, no ST T wave changes. UA- negative, RVP- negative , CXR s/o No consolidation or infiltrate.  No pleural effusion. Labs pertinent for K of 3.4, CT head and CT cervical spine s/o no acute pathology, 1.2 x 1.0 cm posterior right thyroid lobe hypodense lesion.     will admit to medicine for evaluation of encephalopathy secondary to worsening dementia.    PAST MEDICAL & SURGICAL HISTORY:  Hypothyroidism  HLD (hyperlipidemia)  Osteoporosis  Dementia  HTN (hypertension)  Heart failure  NSTEMI (non-ST elevated myocardial infarction)  Atrial fibrillation  COPD (chronic obstructive pulmonary disease)  No significant past surgical history      [ x] Diabetes   [ x] Hypertension  [ x] Hyperlipidemia  [x ] CAD  [ ] PCI  [ ] CABG    PREVIOUS DIAGNOSTIC TESTING:    [ ] Echocardiogram: < from: Transthoracic Echocardiogram (11.12.17 @ 07:37) >  CONCLUSIONS:  1. Densly calcified mitral valve leaflet, mitral annulus  calcification. Mild-moderate mitral regurgitation.  2. Calcified trileaflet aortic valve with normal opening.  Mild aortic regurgitation.  3. Aortic Root: 3.3 cm.  4. Mild left atrial enlargement.  5. Moderate concentric left ventricular hypertrophy.  6. Normal Left Ventricular Systolic Function,  (EF = 55 to  60%)  7. Grade II diastolic dysfunction.  8. Normal right atrium.  9. Right ventricle not well visualized.  10. RA Pressure is 10 mm Hg.  11. RV systolic pressure is 39 mm Hg.  12. There is mild tricuspid regurgitation.  13. Small pericardial effusion. No echocardiographic  evidence of pericardial tamponade.  14. Bilateral pleural effusions.    < end of copied text >    [ ]  Catheterization:  [ ] Stress Test:  	     MEDICATIONS:  apixaban 2.5 milliGRAM(s) Oral every 12 hours  aspirin enteric coated 81 milliGRAM(s) Oral daily  furosemide    Tablet 40 milliGRAM(s) Oral daily  lisinopril 5 milliGRAM(s) Oral daily  metoprolol     tartrate 12.5 milliGRAM(s) Oral two times a day      ALBUTerol/ipratropium for Nebulization 3 milliLiter(s) Nebulizer every 6 hours    clonazePAM Tablet 0.5 milliGRAM(s) Oral at bedtime    docusate sodium 100 milliGRAM(s) Oral two times a day  pantoprazole    Tablet 40 milliGRAM(s) Oral before breakfast  senna 2 Tablet(s) Oral at bedtime    simvastatin 40 milliGRAM(s) Oral at bedtime    ascorbic acid 250 milliGRAM(s) Oral daily  fluticasone propionate 50 MICROgram(s)/spray Nasal Spray 1 Spray(s) Both Nostrils daily  multivitamin 1 Tablet(s) Oral daily      Allergies    penicillin (Unknown)    Intolerances        FAMILY HISTORY:  No pertinent family history in first degree relatives      SOCIAL HISTORY:    [x ] Non-smoker  [ ] Smoker  [ ] Alcohol      REVIEW OF SYSTEMS:  [ ]chest pain  [x  ]shortness of breath  [  ]palpitations  [  ]syncope  [ ]near syncope [  ]diplopia  [ x ]altered mental status   [  ]fevers  [ ]chills [ ]nausea  [ ]vomitting  [ ]abdominal pain  [ ]melena  [ ]BRBPR  [  ]epistaxis  [  ]rash  [  ]lower extremity edema      CONSTITUTIONAL: No fever, weight loss, or fatigue  EYES: No eye pain, visual disturbances, or discharge  ENMT:  No difficulty hearing, tinnitus, vertigo; No sinus or throat pain  NECK: No pain or stiffness  RESPIRATORY: No cough, wheezing, chills or hemoptysis; No Shortness of Breath  CARDIOVASCULAR: No chest pain, palpitations, passing out, dizziness, or leg swelling  GASTROINTESTINAL: No abdominal or epigastric pain. No nausea, vomiting, or hematemesis; No diarrhea or constipation. No melena or hematochezia.  GENITOURINARY: No dysuria, frequency, hematuria, or incontinence  NEUROLOGICAL: confused , non focal s  SKIN: No itching, burning, rashes, or lesions   LYMPH Nodes: No enlarged glands  ENDOCRINE: No heat or cold intolerance; No hair loss  MUSCULOSKELETAL: No joint pain or swelling; No muscle, back, or extremity pain  PSYCHIATRIC: No depression, anxiety, mood swings, or difficulty sleeping  HEME/LYMPH: No easy bruising, or bleeding gums  ALLERY AND IMMUNOLOGIC: No hives or eczema	    [X ] All others negative	  [ ] Unable to obtain    PHYSICAL EXAM:  T(C): 37.1 (03-01-18 @ 05:06), Max: 37.3 (02-28-18 @ 16:57)  HR: 85 (03-01-18 @ 05:06) (80 - 91)  BP: 140/65 (03-01-18 @ 05:06) (140/65 - 169/70)  RR: 17 (03-01-18 @ 05:06) (17 - 19)  SpO2: 97% (03-01-18 @ 05:06) (94% - 100%)  Wt(kg): --  I&O's Summary      Appearance: Normal	  HEENT:   Normal oral mucosa, PERRL, EOMI	  Lymphatic: No lymphadenopathy  Cardiovascular: Normal S1 S2, No JVD, No murmurs, No edema  Respiratory: Lungs clear to auscultation	  Psychiatry: A & O x 3, Mood & affect appropriate  Gastrointestinal:  Soft, Non-tender, + BS	  Skin: No rashes, No ecchymoses, No cyanosis	  Neurologic: Non-focal  Extremities: Normal range of motion, No clubbing, cyanosis or edema  Vascular: Peripheral pulses palpable 2+ bilaterally      ECG:  	NSR 80 bpm, lad lvh cannot exclude inferior infarct of indeterminant age.   RADIOLOGY: < from: Xray Chest 1 View-PORTABLE IMMEDIATE (02.28.18 @ 01:05) >  FINDINGS/  IMPRESSION:  No consolidation or infiltrate.  No pleural effusion.    Heart size cannot be accurately assessed in this projection.    Chronic right rib deformity    < end of copied text >    OTHER: 	  	  LABS:	 	    CARDIAC MARKERS:                                  12.6   9.6   )-----------( 175      ( 28 Feb 2018 00:30 )             37.9     02-28    140  |  104  |  26<H>  ----------------------------<  119<H>  3.4<L>   |  27  |  1.14    Ca    9.7      28 Feb 2018 00:30    TPro  7.1  /  Alb  3.4<L>  /  TBili  0.4  /  DBili  x   /  AST  21  /  ALT  26  /  AlkPhos  76  02-28        ASSESSMENT/PLAN: 89 y/o F from home, lives with son, PMHx of Hypothyroid, HTN, HLD, CAD, Afib, COPD, and Dementia BIB EMS to ED for AMS. Afib with RVR on admission      GI / DVT prophylaxis.   keep K>4, mag >2.0  Cont A/C with eliquis ,   ASA / statin   Rate control with low dose BB, increase as per BP allows   would still cont home dose of Diuretic with lasix po   neuro follow up   tele with rate control Afib now. pt was agitated in ER .   D/W Dr Dailey

## 2018-03-01 NOTE — PHYSICAL THERAPY INITIAL EVALUATION ADULT - IMPAIRMENTS FOUND, PT EVAL
muscle strength/aerobic capacity/endurance/gross motor/gait, locomotion, and balance/poor safety awareness/arousal, attention, and cognition/cognitive impairment

## 2018-03-01 NOTE — CONSULT NOTE ADULT - ATTENDING COMMENTS
Patient seen and examined, agree with above assessment and plan as transcribed above.    - Cont eliquis  - Currently in NSR  - w/u of AMS per primary team  - Pt is DNR    Darin Dailey MD, FACC  [facility], [facility]  [address] [place] [address]  PHONE:  [phone]  BEEPER : [phone]

## 2018-03-01 NOTE — PROGRESS NOTE ADULT - SUBJECTIVE AND OBJECTIVE BOX
PGY 1 Note discussed with supervising resident and primary attending    Patient is a 89y old  Female who presents with a chief complaint of AMS (2018 11:22)      INTERVAL HPI/OVERNIGHT EVENTS: no new complaints    MEDICATIONS  (STANDING):  ALBUTerol/ipratropium for Nebulization 3 milliLiter(s) Nebulizer every 6 hours  apixaban 2.5 milliGRAM(s) Oral every 12 hours  ascorbic acid 250 milliGRAM(s) Oral daily  aspirin enteric coated 81 milliGRAM(s) Oral daily  clonazePAM Tablet 0.5 milliGRAM(s) Oral at bedtime  docusate sodium 100 milliGRAM(s) Oral two times a day  fluticasone propionate 50 MICROgram(s)/spray Nasal Spray 1 Spray(s) Both Nostrils daily  furosemide    Tablet 40 milliGRAM(s) Oral daily  lisinopril 5 milliGRAM(s) Oral daily  metoprolol     tartrate 12.5 milliGRAM(s) Oral two times a day  multivitamin 1 Tablet(s) Oral daily  pantoprazole    Tablet 40 milliGRAM(s) Oral before breakfast  senna 2 Tablet(s) Oral at bedtime  simvastatin 40 milliGRAM(s) Oral at bedtime    MEDICATIONS  (PRN):      __________________________________________________  REVIEW OF SYSTEMS:    CONSTITUTIONAL: No fever,   EYES: no acute visual disturbances  NECK: No pain or stiffness  RESPIRATORY: No cough; No shortness of breath  CARDIOVASCULAR: No chest pain, no palpitations  GASTROINTESTINAL: No pain. No nausea or vomiting; No diarrhea   NEUROLOGICAL: No headache or numbness, no tremors  MUSCULOSKELETAL: No joint pain, no muscle pain  GENITOURINARY: no dysuria, no frequency, no hesitancy  PSYCHIATRY: no depression , no anxiety  ALL OTHER  ROS negative        Vital Signs Last 24 Hrs  T(C): 37.1 (01 Mar 2018 05:06), Max: 37.3 (2018 16:57)  T(F): 98.7 (01 Mar 2018 05:06), Max: 99.1 (2018 16:57)  HR: 85 (01 Mar 2018 05:06) (85 - 91)  BP: 140/65 (01 Mar 2018 05:06) (140/65 - 151/73)  RR: 17 (01 Mar 2018 05:06) (17 - 18)  SpO2: 97% (01 Mar 2018 05:06) (94% - 100%)    ________________________________________________  PHYSICAL EXAM:  GENERAL: NAD  HEENT:Normocephalic;  conjunctivae and sclerae clear; moist mucous membranes;   NECK : supple  CHEST/LUNG: Clear to auscuitation bilaterally with good air entry   HEART: S1 S2  regular; no murmurs, gallops or rubs  ABDOMEN: Soft, Nontender, Nondistended; Bowel sounds present  EXTREMITIES: no cyanosis; no edema; no calf tenderness  NERVOUS SYSTEM:  AOX1-2    _________________________________________________  LABS:                        12.6   9.6   )-----------( 175      ( 2018 00:30 )             37.9     03-01    140  |  103  |  23<H>  ----------------------------<  x   3.3<L>   |  30  |  x     Ca    9.6      01 Mar 2018 07:00  Mg     2.5     03-01    TPro  7.1  /  Alb  3.4<L>  /  TBili  0.4  /  DBili  x   /  AST  21  /  ALT  26  /  AlkPhos  76  02-28    PT/INR - ( 2018 00:30 )   PT: 11.7 sec;   INR: 1.07 ratio         PTT - ( 2018 00:30 )  PTT:36.5 sec  Urinalysis Basic - ( 2018 09:35 )    Color: Yellow / Appearance: Clear / S.015 / pH: x  Gluc: x / Ketone: Negative  / Bili: Negative / Urobili: Negative   Blood: x / Protein: Negative / Nitrite: Negative   Leuk Esterase: Trace / RBC: 0-2 /HPF / WBC 0-2 /HPF   Sq Epi: x / Non Sq Epi: Occasional /HPF / Bacteria: Trace /HPF      CAPILLARY BLOOD GLUCOSE                Plan of care was discussed with patient and /or primary care giver; all questions and concerns were addressed and care was aligned with patient's wishes.

## 2018-03-01 NOTE — PHYSICAL THERAPY INITIAL EVALUATION ADULT - PERTINENT HX OF CURRENT PROBLEM, REHAB EVAL
Pt admitted with altered mental status with history of 3 falls yesterday as per chart. Pt x-ray negative for fracture

## 2018-03-01 NOTE — PHYSICAL THERAPY INITIAL EVALUATION ADULT - CRITERIA FOR SKILLED THERAPEUTIC INTERVENTIONS
predicted duration of therapy intervention/impairments found/functional limitations in following categories/therapy frequency/risk reduction/prevention/rehab potential/anticipated discharge recommendation

## 2018-03-01 NOTE — PHYSICAL THERAPY INITIAL EVALUATION ADULT - LIVES WITH, PROFILE
children/As per chart, pt lives at home with son; Pt unreliable historian, and son could not be reached by telephone

## 2018-03-02 DIAGNOSIS — F03.90 UNSPECIFIED DEMENTIA WITHOUT BEHAVIORAL DISTURBANCE: ICD-10-CM

## 2018-03-02 DIAGNOSIS — R94.6 ABNORMAL RESULTS OF THYROID FUNCTION STUDIES: ICD-10-CM

## 2018-03-02 DIAGNOSIS — D72.829 ELEVATED WHITE BLOOD CELL COUNT, UNSPECIFIED: ICD-10-CM

## 2018-03-02 LAB
ANION GAP SERPL CALC-SCNC: 9 MMOL/L — SIGNIFICANT CHANGE UP (ref 5–17)
APPEARANCE UR: ABNORMAL
BILIRUB UR-MCNC: NEGATIVE — SIGNIFICANT CHANGE UP
BUN SERPL-MCNC: 27 MG/DL — HIGH (ref 7–18)
CALCIUM SERPL-MCNC: 9.1 MG/DL — SIGNIFICANT CHANGE UP (ref 8.4–10.5)
CHLORIDE SERPL-SCNC: 104 MMOL/L — SIGNIFICANT CHANGE UP (ref 96–108)
CO2 SERPL-SCNC: 27 MMOL/L — SIGNIFICANT CHANGE UP (ref 22–31)
COLOR SPEC: YELLOW — SIGNIFICANT CHANGE UP
CREAT SERPL-MCNC: 1.06 MG/DL — SIGNIFICANT CHANGE UP (ref 0.5–1.3)
DIFF PNL FLD: ABNORMAL
GLUCOSE SERPL-MCNC: 102 MG/DL — HIGH (ref 70–99)
GLUCOSE UR QL: NEGATIVE — SIGNIFICANT CHANGE UP
HCT VFR BLD CALC: 33.7 % — LOW (ref 34.5–45)
HGB BLD-MCNC: 11.4 G/DL — LOW (ref 11.5–15.5)
KETONES UR-MCNC: NEGATIVE — SIGNIFICANT CHANGE UP
LEUKOCYTE ESTERASE UR-ACNC: ABNORMAL
MAGNESIUM SERPL-MCNC: 2.4 MG/DL — SIGNIFICANT CHANGE UP (ref 1.6–2.6)
MCHC RBC-ENTMCNC: 32.6 PG — SIGNIFICANT CHANGE UP (ref 27–34)
MCHC RBC-ENTMCNC: 33.8 GM/DL — SIGNIFICANT CHANGE UP (ref 32–36)
MCV RBC AUTO: 96.6 FL — SIGNIFICANT CHANGE UP (ref 80–100)
NITRITE UR-MCNC: POSITIVE
PH UR: 6 — SIGNIFICANT CHANGE UP (ref 5–8)
PHOSPHATE SERPL-MCNC: 3.8 MG/DL — SIGNIFICANT CHANGE UP (ref 2.5–4.5)
PLATELET # BLD AUTO: 149 K/UL — LOW (ref 150–400)
POTASSIUM SERPL-MCNC: 3.6 MMOL/L — SIGNIFICANT CHANGE UP (ref 3.5–5.3)
POTASSIUM SERPL-SCNC: 3.6 MMOL/L — SIGNIFICANT CHANGE UP (ref 3.5–5.3)
PROT UR-MCNC: 100
RBC # BLD: 3.48 M/UL — LOW (ref 3.8–5.2)
RBC # FLD: 13.4 % — SIGNIFICANT CHANGE UP (ref 10.3–14.5)
SODIUM SERPL-SCNC: 140 MMOL/L — SIGNIFICANT CHANGE UP (ref 135–145)
SP GR SPEC: 1.01 — SIGNIFICANT CHANGE UP (ref 1.01–1.02)
T3FREE SERPL-MCNC: 2.87 PG/ML — SIGNIFICANT CHANGE UP (ref 1.8–4.6)
T4 FREE SERPL-MCNC: 1 NG/DL — SIGNIFICANT CHANGE UP (ref 0.9–1.8)
UROBILINOGEN FLD QL: NEGATIVE — SIGNIFICANT CHANGE UP
WBC # BLD: 11.5 K/UL — HIGH (ref 3.8–10.5)
WBC # FLD AUTO: 11.5 K/UL — HIGH (ref 3.8–10.5)

## 2018-03-02 PROCEDURE — 93880 EXTRACRANIAL BILAT STUDY: CPT | Mod: 26

## 2018-03-02 RX ORDER — HALOPERIDOL DECANOATE 100 MG/ML
0.5 INJECTION INTRAMUSCULAR ONCE
Qty: 0 | Refills: 0 | Status: COMPLETED | OUTPATIENT
Start: 2018-03-02 | End: 2018-03-02

## 2018-03-02 RX ORDER — SODIUM CHLORIDE 9 MG/ML
250 INJECTION INTRAMUSCULAR; INTRAVENOUS; SUBCUTANEOUS ONCE
Qty: 0 | Refills: 0 | Status: COMPLETED | OUTPATIENT
Start: 2018-03-02 | End: 2018-03-02

## 2018-03-02 RX ADMIN — Medication 40 MILLIGRAM(S): at 05:38

## 2018-03-02 RX ADMIN — Medication 3 MILLILITER(S): at 03:21

## 2018-03-02 RX ADMIN — LISINOPRIL 5 MILLIGRAM(S): 2.5 TABLET ORAL at 05:37

## 2018-03-02 RX ADMIN — Medication 50 MICROGRAM(S): at 06:45

## 2018-03-02 RX ADMIN — Medication 12.5 MILLIGRAM(S): at 17:45

## 2018-03-02 RX ADMIN — Medication 1 TABLET(S): at 11:57

## 2018-03-02 RX ADMIN — Medication 3 MILLILITER(S): at 09:43

## 2018-03-02 RX ADMIN — SENNA PLUS 2 TABLET(S): 8.6 TABLET ORAL at 22:40

## 2018-03-02 RX ADMIN — SODIUM CHLORIDE 500 MILLILITER(S): 9 INJECTION INTRAMUSCULAR; INTRAVENOUS; SUBCUTANEOUS at 15:53

## 2018-03-02 RX ADMIN — Medication 81 MILLIGRAM(S): at 11:56

## 2018-03-02 RX ADMIN — Medication 0.5 MILLIGRAM(S): at 22:39

## 2018-03-02 RX ADMIN — Medication 3 MILLILITER(S): at 21:02

## 2018-03-02 RX ADMIN — APIXABAN 2.5 MILLIGRAM(S): 2.5 TABLET, FILM COATED ORAL at 05:38

## 2018-03-02 RX ADMIN — Medication 3 MILLILITER(S): at 15:21

## 2018-03-02 RX ADMIN — Medication 100 MILLIGRAM(S): at 17:45

## 2018-03-02 RX ADMIN — SIMVASTATIN 40 MILLIGRAM(S): 20 TABLET, FILM COATED ORAL at 22:41

## 2018-03-02 RX ADMIN — Medication 100 MILLIGRAM(S): at 05:37

## 2018-03-02 RX ADMIN — Medication 12.5 MILLIGRAM(S): at 05:38

## 2018-03-02 RX ADMIN — PANTOPRAZOLE SODIUM 40 MILLIGRAM(S): 20 TABLET, DELAYED RELEASE ORAL at 05:37

## 2018-03-02 RX ADMIN — Medication 1 SPRAY(S): at 11:57

## 2018-03-02 RX ADMIN — Medication 250 MILLIGRAM(S): at 11:56

## 2018-03-02 RX ADMIN — APIXABAN 2.5 MILLIGRAM(S): 2.5 TABLET, FILM COATED ORAL at 17:45

## 2018-03-02 NOTE — PROGRESS NOTE ADULT - SUBJECTIVE AND OBJECTIVE BOX
Subjective:  pt seen and examined, no complaints on exam.   no events overnight . tele stable     acetaminophen   Tablet. 650 milliGRAM(s) Oral every 6 hours PRN  acetaminophen 300 mG/codeine 30 mG 2 Tablet(s) Oral every 4 hours PRN  ALBUTerol/ipratropium for Nebulization 3 milliLiter(s) Nebulizer every 6 hours  apixaban 2.5 milliGRAM(s) Oral every 12 hours  ascorbic acid 250 milliGRAM(s) Oral daily  aspirin enteric coated 81 milliGRAM(s) Oral daily  clonazePAM Tablet 0.5 milliGRAM(s) Oral at bedtime  docusate sodium 100 milliGRAM(s) Oral two times a day  fluticasone propionate 50 MICROgram(s)/spray Nasal Spray 1 Spray(s) Both Nostrils daily  furosemide    Tablet 40 milliGRAM(s) Oral daily  levothyroxine 50 MICROGram(s) Oral daily  lisinopril 5 milliGRAM(s) Oral daily  metoprolol     tartrate 12.5 milliGRAM(s) Oral two times a day  multivitamin 1 Tablet(s) Oral daily  pantoprazole    Tablet 40 milliGRAM(s) Oral before breakfast  senna 2 Tablet(s) Oral at bedtime  simvastatin 40 milliGRAM(s) Oral at bedtime                            12.2   8.6   )-----------( 172      ( 01 Mar 2018 07:00 )             36.5       Hemoglobin: 12.2 g/dL (03-01 @ 07:00)  Hemoglobin: 12.6 g/dL (02-28 @ 00:30)      03-01    140  |  103  |  23<H>  ----------------------------<  98  3.3<L>   |  30  |  0.94    Ca    9.6      01 Mar 2018 07:00  Phos  4.3     03-01  Mg     2.5     03-01      Creatinine Trend: 0.94<--, 1.14<--    COAGS:           T(C): 36.4 (03-02-18 @ 05:08), Max: 37.2 (03-01-18 @ 14:19)  HR: 110 (03-02-18 @ 05:08) (96 - 110)  BP: 133/63 (03-02-18 @ 05:08) (87/45 - 146/70)  RR: 18 (03-02-18 @ 05:08) (16 - 18)  SpO2: 94% (03-02-18 @ 05:08) (94% - 98%)  Wt(kg): --    I&O's Summary  HEENT:   Normal oral mucosa, PERRL, EOMI	  Lymphatic: No lymphadenopathy , no edema  Cardiovascular: Normal S1 S2, No JVD, No murmurs , Peripheral pulses palpable 2+ bilaterally  Respiratory: Lungs clear to auscultation, normal effort 	  Gastrointestinal:  Soft, Non-tender, + BS	    TELEMETRY:  nsr . PAC , afib 	       DIAGNOSTIC TESTING:  [ ] Echocardiogram: < from: Transthoracic Echocardiogram (11.12.17 @ 07:37) >  CONCLUSIONS:  1. Densly calcified mitral valve leaflet, mitral annulus  calcification. Mild-moderate mitral regurgitation.  2. Calcified trileaflet aortic valve with normal opening.  Mild aortic regurgitation.  3. Aortic Root: 3.3 cm.  4. Mild left atrial enlargement.  5. Moderate concentric left ventricular hypertrophy.  6. Normal Left Ventricular Systolic Function,  (EF = 55 to  60%)  7. Grade II diastolic dysfunction.  8. Normal right atrium.  9. Right ventricle not well visualized.  10. RA Pressure is 10 mm Hg.  11. RV systolic pressure is 39 mm Hg.  12. There is mild tricuspid regurgitation.  13. Small pericardial effusion. No echocardiographic  evidence of pericardial tamponade.  14. Bilateral pleural effusions.    < end of copied text >    [ ]  Catheterization:  [ ] Stress Test:    OTHER: 	        ASSESSMENT/PLAN: 	89y Female from home, lives with son, PMHx of Hypothyroid, HTN, HLD, CAD, Afib, COPD, and Dementia BIB EMS to ED for AMS. Afib with RVR on admission      GI / DVT prophylaxis.   keep K>4, mag >2.0  Cont A/C with eliquis ,   ASA / statin   Rate control with low dose BB,  tsh 2.87 , cont synthroid .   D/W Dr Dailey

## 2018-03-02 NOTE — PROGRESS NOTE ADULT - ATTENDING COMMENTS
Patient seen and examined, agree with above assessment and plan as transcribed above.    - Cont eliquis  - W/u of AMS per med  - F/u repeat echo to see if pericardial effusion resolved    Darin Dailey MD, FACC  [facility], [facility]  [address] [place] [address]  PHONE:  [phone]  BEEPER : [phone]

## 2018-03-02 NOTE — PROGRESS NOTE ADULT - SUBJECTIVE AND OBJECTIVE BOX
PGY 1 Note discussed with supervising resident and primary attending    Patient is a 89y old  Female who presents with a chief complaint of AMS (28 Feb 2018 11:22)      INTERVAL HPI/OVERNIGHT EVENTS: no new complaints    MEDICATIONS  (STANDING):  ALBUTerol/ipratropium for Nebulization 3 milliLiter(s) Nebulizer every 6 hours  apixaban 2.5 milliGRAM(s) Oral every 12 hours  ascorbic acid 250 milliGRAM(s) Oral daily  aspirin enteric coated 81 milliGRAM(s) Oral daily  clonazePAM Tablet 0.5 milliGRAM(s) Oral at bedtime  docusate sodium 100 milliGRAM(s) Oral two times a day  fluticasone propionate 50 MICROgram(s)/spray Nasal Spray 1 Spray(s) Both Nostrils daily  furosemide    Tablet 40 milliGRAM(s) Oral daily  levothyroxine 50 MICROGram(s) Oral daily  lisinopril 5 milliGRAM(s) Oral daily  metoprolol     tartrate 12.5 milliGRAM(s) Oral two times a day  multivitamin 1 Tablet(s) Oral daily  pantoprazole    Tablet 40 milliGRAM(s) Oral before breakfast  senna 2 Tablet(s) Oral at bedtime  simvastatin 40 milliGRAM(s) Oral at bedtime    MEDICATIONS  (PRN):  acetaminophen   Tablet. 650 milliGRAM(s) Oral every 6 hours PRN Mild Pain (1 - 3)  acetaminophen 300 mG/codeine 30 mG 2 Tablet(s) Oral every 4 hours PRN Moderate Pain (4 - 6)      __________________________________________________  REVIEW OF SYSTEMS:    CONSTITUTIONAL: No fever,   EYES: no acute visual disturbances  NECK: No pain or stiffness  RESPIRATORY: No cough; No shortness of breath  CARDIOVASCULAR: No chest pain, no palpitations  GASTROINTESTINAL: No pain. No nausea or vomiting; No diarrhea   NEUROLOGICAL: No headache or numbness, no tremors  MUSCULOSKELETAL: No joint pain, no muscle pain  GENITOURINARY: no dysuria, no frequency, no hesitancy  PSYCHIATRY: no depression , no anxiety  ALL OTHER  ROS negative        Vital Signs Last 24 Hrs  T(C): 36.4 (02 Mar 2018 05:08), Max: 37.2 (01 Mar 2018 14:19)  T(F): 97.6 (02 Mar 2018 05:08), Max: 98.9 (01 Mar 2018 14:19)  HR: 110 (02 Mar 2018 05:08) (96 - 110)  BP: 133/63 (02 Mar 2018 05:08) (87/45 - 146/70)  RR: 18 (02 Mar 2018 05:08) (16 - 18)  SpO2: 94% (02 Mar 2018 05:08) (94% - 98%)    ________________________________________________  PHYSICAL EXAM:  GENERAL: NAD  HEENT:Normocephalic;  conjunctivae and sclerae clear  NECK : supple  CHEST/LUNG: Clear to auscuitation bilaterally with good air entry   HEART: S1 S2  regular; no murmurs, gallops or rubs  ABDOMEN: tender to suprapubic region, Nondistended; Bowel sounds present  EXTREMITIES: no cyanosis; no edema; no calf tenderness  NERVOUS SYSTEM:  AOx1-2    _________________________________________________  LABS:                        11.4   11.5  )-----------( 149      ( 02 Mar 2018 06:28 )             33.7     03-02    140  |  104  |  27<H>  ----------------------------<  102<H>  3.6   |  27  |  1.06    Ca    9.1      02 Mar 2018 06:28  Phos  3.8     03-02  Mg     2.4     03-02          CAPILLARY BLOOD GLUCOSE              Plan of care was discussed with patient and /or primary care giver; all questions and concerns were addressed and care was aligned with patient's wishes.

## 2018-03-02 NOTE — CHART NOTE - NSCHARTNOTEFT_GEN_A_CORE
pt has waxing/waning mental status with bouts of agitation  if agitated overnight, coverage to give haldol 0.5mg IV one time dose and re-assess

## 2018-03-02 NOTE — PROGRESS NOTE ADULT - SUBJECTIVE AND OBJECTIVE BOX
Patient is a 89y old  Female who presents with a chief complaint of AMS /frequent fall      INTERVAL HPI/OVERNIGHT EVENTS:  T(C): 36.4 (03-02-18 @ 05:08), Max: 37.2 (03-01-18 @ 14:19)  HR: 110 (03-02-18 @ 05:08) (96 - 110)  BP: 133/63 (03-02-18 @ 05:08) (87/45 - 146/70)  RR: 18 (03-02-18 @ 05:08) (16 - 18)  SpO2: 94% (03-02-18 @ 05:08) (94% - 98%)  Wt(kg): --    LABS:                        11.4   11.5  )-----------( 149      ( 02 Mar 2018 06:28 )             33.7     03-02    140  |  104  |  27<H>  ----------------------------<  102<H>  3.6   |  27  |  1.06    Ca    9.1      02 Mar 2018 06:28  Phos  3.8     03-02  Mg     2.4     03-02          CAPILLARY BLOOD GLUCOSE            RADIOLOGY & ADDITIONAL TESTS:  < from: CT Head No Cont (02.28.18 @ 04:30) >  IMPRESSION:     CT Head: No acute intracranial hemorrhage or calvarial fracture.    CT cervical spine: No acute cervical spine fracture or evidence of   traumatic malalignment.    < end of copied text >  < from: Xray Hip 2 Views, Bilateral (02.28.18 @ 16:35) >  IMPRESSION: No fracture. Other findings as above.    < end of copied text >    Consultant(s) Notes Reviewed:  [x ] YES  [ ] NO    PHYSICAL EXAM:  GENERAL: well built, well nourished  HEAD:  Atraumatic, Normocephalic  EYES: EOMI, PERRLA, conjunctiva and sclera clear  ENT: No tonsillar erythema, exudates, or enlargement; Moist mucous membranes, Good dentition, No lesions  NECK: Supple, No JVD, Normal thyroid, no enlarged nodes  NERVOUS SYSTEM:  confused; Motor Strength 5/5 B/L upper and lower extremities; DTRs 2+ intact and symmetric, sensory intact  CHEST/LUNG: B/L good air entry; No rales, rhonchi, or wheezing  HEART: S1S2 normal, no S3, Regular rate and rhythm; No murmurs, rubs, or gallops  ABDOMEN: Soft, Nontender, Nondistended; Bowel sounds present  EXTREMITIES:  2+ Peripheral Pulses, No clubbing, cyanosis, or edema  LYMPH: No lymphadenopathy noted  SKIN: No rashes or lesions    Care Discussed with Consultants/Other Providers [ x] YES  [ ] NO

## 2018-03-03 LAB
ANION GAP SERPL CALC-SCNC: 9 MMOL/L — SIGNIFICANT CHANGE UP (ref 5–17)
BUN SERPL-MCNC: 33 MG/DL — HIGH (ref 7–18)
CALCIUM SERPL-MCNC: 9.2 MG/DL — SIGNIFICANT CHANGE UP (ref 8.4–10.5)
CHLORIDE SERPL-SCNC: 105 MMOL/L — SIGNIFICANT CHANGE UP (ref 96–108)
CO2 SERPL-SCNC: 27 MMOL/L — SIGNIFICANT CHANGE UP (ref 22–31)
CREAT SERPL-MCNC: 0.98 MG/DL — SIGNIFICANT CHANGE UP (ref 0.5–1.3)
GLUCOSE SERPL-MCNC: 110 MG/DL — HIGH (ref 70–99)
HCT VFR BLD CALC: 31.4 % — LOW (ref 34.5–45)
HGB BLD-MCNC: 10.5 G/DL — LOW (ref 11.5–15.5)
MAGNESIUM SERPL-MCNC: 2.5 MG/DL — SIGNIFICANT CHANGE UP (ref 1.6–2.6)
MCHC RBC-ENTMCNC: 32.7 PG — SIGNIFICANT CHANGE UP (ref 27–34)
MCHC RBC-ENTMCNC: 33.4 GM/DL — SIGNIFICANT CHANGE UP (ref 32–36)
MCV RBC AUTO: 97.8 FL — SIGNIFICANT CHANGE UP (ref 80–100)
PHOSPHATE SERPL-MCNC: 3.3 MG/DL — SIGNIFICANT CHANGE UP (ref 2.5–4.5)
PLATELET # BLD AUTO: 138 K/UL — LOW (ref 150–400)
POTASSIUM SERPL-MCNC: 3.6 MMOL/L — SIGNIFICANT CHANGE UP (ref 3.5–5.3)
POTASSIUM SERPL-SCNC: 3.6 MMOL/L — SIGNIFICANT CHANGE UP (ref 3.5–5.3)
RBC # BLD: 3.21 M/UL — LOW (ref 3.8–5.2)
RBC # FLD: 13.8 % — SIGNIFICANT CHANGE UP (ref 10.3–14.5)
SODIUM SERPL-SCNC: 141 MMOL/L — SIGNIFICANT CHANGE UP (ref 135–145)
WBC # BLD: 10.2 K/UL — SIGNIFICANT CHANGE UP (ref 3.8–10.5)
WBC # FLD AUTO: 10.2 K/UL — SIGNIFICANT CHANGE UP (ref 3.8–10.5)

## 2018-03-03 RX ORDER — HALOPERIDOL DECANOATE 100 MG/ML
0.5 INJECTION INTRAMUSCULAR ONCE
Qty: 0 | Refills: 0 | Status: COMPLETED | OUTPATIENT
Start: 2018-03-03 | End: 2018-03-03

## 2018-03-03 RX ORDER — ZOLPIDEM TARTRATE 10 MG/1
5 TABLET ORAL AT BEDTIME
Qty: 0 | Refills: 0 | Status: DISCONTINUED | OUTPATIENT
Start: 2018-03-03 | End: 2018-03-03

## 2018-03-03 RX ADMIN — Medication 0.5 MILLIGRAM(S): at 23:14

## 2018-03-03 RX ADMIN — PANTOPRAZOLE SODIUM 40 MILLIGRAM(S): 20 TABLET, DELAYED RELEASE ORAL at 06:43

## 2018-03-03 RX ADMIN — APIXABAN 2.5 MILLIGRAM(S): 2.5 TABLET, FILM COATED ORAL at 17:38

## 2018-03-03 RX ADMIN — Medication 100 MILLIGRAM(S): at 17:38

## 2018-03-03 RX ADMIN — Medication 3 MILLILITER(S): at 15:40

## 2018-03-03 RX ADMIN — LISINOPRIL 5 MILLIGRAM(S): 2.5 TABLET ORAL at 06:43

## 2018-03-03 RX ADMIN — HALOPERIDOL DECANOATE 0.5 MILLIGRAM(S): 100 INJECTION INTRAMUSCULAR at 23:14

## 2018-03-03 RX ADMIN — Medication 50 MICROGRAM(S): at 06:43

## 2018-03-03 RX ADMIN — APIXABAN 2.5 MILLIGRAM(S): 2.5 TABLET, FILM COATED ORAL at 06:30

## 2018-03-03 RX ADMIN — Medication 81 MILLIGRAM(S): at 11:41

## 2018-03-03 RX ADMIN — HALOPERIDOL DECANOATE 0.5 MILLIGRAM(S): 100 INJECTION INTRAMUSCULAR at 13:35

## 2018-03-03 RX ADMIN — Medication 12.5 MILLIGRAM(S): at 17:38

## 2018-03-03 RX ADMIN — SENNA PLUS 2 TABLET(S): 8.6 TABLET ORAL at 23:13

## 2018-03-03 RX ADMIN — Medication 1 TABLET(S): at 11:41

## 2018-03-03 RX ADMIN — Medication 12.5 MILLIGRAM(S): at 06:43

## 2018-03-03 RX ADMIN — Medication 250 MILLIGRAM(S): at 11:40

## 2018-03-03 RX ADMIN — Medication 100 MILLIGRAM(S): at 06:44

## 2018-03-03 RX ADMIN — Medication 1 SPRAY(S): at 11:41

## 2018-03-03 RX ADMIN — SIMVASTATIN 40 MILLIGRAM(S): 20 TABLET, FILM COATED ORAL at 23:21

## 2018-03-03 RX ADMIN — Medication 40 MILLIGRAM(S): at 06:43

## 2018-03-03 NOTE — PROGRESS NOTE ADULT - ATTENDING COMMENTS
Patient seen and examined, agree with above assessment and plan as transcribed above.    - Awaiting echo check for resolution of pericardial effusion    Darin Dailey MD, FACC  [facility], [facility]  [address] [place] [address]  PHONE:  [phone]  BEEPER : [phone]

## 2018-03-03 NOTE — PROGRESS NOTE ADULT - SUBJECTIVE AND OBJECTIVE BOX
Patient is a 89y old  Female who presents with a chief complaint of AMS/frequent fall      INTERVAL HPI/OVERNIGHT EVENTS:  T(C): 36.8 (18 @ 14:15), Max: 37 (18 @ 21:10)  HR: 115 (18 @ 14:15) (61 - 115)  BP: 112/63 (18 @ 14:15) (100/57 - 117/50)  RR: 18 (18 @ 14:15) (17 - 18)  SpO2: 100% (18 @ 14:15) (93% - 100%)  Wt(kg): --    LABS:                        10.5   10.2  )-----------( 138      ( 03 Mar 2018 06:01 )             31.4     03-03    141  |  105  |  33<H>  ----------------------------<  110<H>  3.6   |  27  |  0.98    Ca    9.2      03 Mar 2018 06:01  Phos  3.3     03-03  Mg     2.5     03-03        Urinalysis Basic - ( 02 Mar 2018 23:27 )    Color: Yellow / Appearance: Slightly Turbid / S.015 / pH: x  Gluc: x / Ketone: Negative  / Bili: Negative / Urobili: Negative   Blood: x / Protein: 100 / Nitrite: Positive   Leuk Esterase: Moderate / RBC: 2-5 /HPF / WBC >50 /HPF   Sq Epi: x / Non Sq Epi: Few /HPF / Bacteria: TNTC /HPF      CAPILLARY BLOOD GLUCOSE            RADIOLOGY & ADDITIONAL TESTS:    Consultant(s) Notes Reviewed:  [x ] YES  [ ] NO    PHYSICAL EXAM:  GENERAL: well built, well nourished  HEAD:  Atraumatic, Normocephalic  EYES: EOMI, PERRLA, conjunctiva and sclera clear  ENT: No tonsillar erythema, exudates, or enlargement; Moist mucous membranes, Good dentition, No lesions  NECK: Supple, No JVD, Normal thyroid, no enlarged nodes  NERVOUS SYSTEM:  Alert & Oriented X3, Good concentration; Motor Strength 5/5 B/L upper and lower extremities; DTRs 2+ intact and symmetric, sensory intact  CHEST/LUNG: B/L good air entry; No rales, rhonchi, or wheezing  HEART: S1S2 irregular rate controlled  ABDOMEN: Soft, Nontender, Nondistended; Bowel sounds present  EXTREMITIES:  2+ Peripheral Pulses, No clubbing, cyanosis, or edema  LYMPH: No lymphadenopathy noted  SKIN: No rashes or lesions    Care Discussed with Consultants/Other Providers [ x] YES  [ ] NO

## 2018-03-03 NOTE — PROGRESS NOTE ADULT - SUBJECTIVE AND OBJECTIVE BOX
Subjective:  pt seen and examined, no complaints on exam.   ROS neg     acetaminophen   Tablet. 650 milliGRAM(s) Oral every 6 hours PRN  acetaminophen 300 mG/codeine 30 mG 2 Tablet(s) Oral every 4 hours PRN  ALBUTerol/ipratropium for Nebulization 3 milliLiter(s) Nebulizer every 6 hours  apixaban 2.5 milliGRAM(s) Oral every 12 hours  ascorbic acid 250 milliGRAM(s) Oral daily  aspirin enteric coated 81 milliGRAM(s) Oral daily  clonazePAM Tablet 0.5 milliGRAM(s) Oral at bedtime  docusate sodium 100 milliGRAM(s) Oral two times a day  fluticasone propionate 50 MICROgram(s)/spray Nasal Spray 1 Spray(s) Both Nostrils daily  furosemide    Tablet 40 milliGRAM(s) Oral daily  levothyroxine 50 MICROGram(s) Oral daily  lisinopril 5 milliGRAM(s) Oral daily  metoprolol     tartrate 12.5 milliGRAM(s) Oral two times a day  multivitamin 1 Tablet(s) Oral daily  pantoprazole    Tablet 40 milliGRAM(s) Oral before breakfast  senna 2 Tablet(s) Oral at bedtime  simvastatin 40 milliGRAM(s) Oral at bedtime                            11.4   11.5  )-----------( 149      ( 02 Mar 2018 06:28 )             33.7       Hemoglobin: 11.4 g/dL (03-02 @ 06:28)  Hemoglobin: 12.2 g/dL (03-01 @ 07:00)  Hemoglobin: 12.6 g/dL (02-28 @ 00:30)      03-02    140  |  104  |  27<H>  ----------------------------<  102<H>  3.6   |  27  |  1.06    Ca    9.1      02 Mar 2018 06:28  Phos  3.8     03-02  Mg     2.4     03-02      Creatinine Trend: 1.06<--, 0.94<--, 1.14<--    COAGS:           T(C): 37 (03-02-18 @ 21:10), Max: 37 (03-02-18 @ 21:10)  HR: 104 (03-02-18 @ 21:10) (61 - 110)  BP: 117/50 (03-02-18 @ 21:10) (100/57 - 133/63)  RR: 17 (03-02-18 @ 21:10) (17 - 18)  SpO2: 96% (03-02-18 @ 21:10) (93% - 97%)  Wt(kg): --    I&O's Summary       HEENT:   Normal oral mucosa, PERRL, EOMI	  Lymphatic: No lymphadenopathy , no edema  Cardiovascular: Normal S1 S2, No JVD, No murmurs , Peripheral pulses palpable 2+ bilaterally  Respiratory: Lungs clear to auscultation, normal effort 	  Gastrointestinal:  Soft, Non-tender, + BS	    TELEMETRY:  nsr . PAC , afib 	       DIAGNOSTIC TESTING:  [ ] Echocardiogram: < from: Transthoracic Echocardiogram (11.12.17 @ 07:37) >  CONCLUSIONS:  1. Densly calcified mitral valve leaflet, mitral annulus  calcification. Mild-moderate mitral regurgitation.  2. Calcified trileaflet aortic valve with normal opening.  Mild aortic regurgitation.  3. Aortic Root: 3.3 cm.  4. Mild left atrial enlargement.  5. Moderate concentric left ventricular hypertrophy.  6. Normal Left Ventricular Systolic Function,  (EF = 55 to  60%)  7. Grade II diastolic dysfunction.  8. Normal right atrium.  9. Right ventricle not well visualized.  10. RA Pressure is 10 mm Hg.  11. RV systolic pressure is 39 mm Hg.  12. There is mild tricuspid regurgitation.  13. Small pericardial effusion. No echocardiographic  evidence of pericardial tamponade.  14. Bilateral pleural effusions.    < end of copied text >    [ ]  Catheterization:  [ ] Stress Test:    OTHER: 	        ASSESSMENT/PLAN: 	89y Female from home, lives with son, PMHx of Hypothyroid, HTN, HLD, CAD, Afib, COPD, and Dementia BIB EMS to ED for AMS. Afib with RVR on admission      GI / DVT prophylaxis.   keep K>4, mag >2.0  Cont A/C with eliquis ,   ASA / statin   medical management for Agitation .  Rate control with low dose BB,  tsh 2.87 , cont synthroid .   aspiration precaution   D/W Dr Dailey

## 2018-03-03 NOTE — PROGRESS NOTE ADULT - SUBJECTIVE AND OBJECTIVE BOX
PGY 1 Note discussed with supervising resident and primary attending    Patient is a 89y old  Female who presents with a chief complaint of AMS (2018 11:22)      INTERVAL HPI/OVERNIGHT EVENTS: no new complaints    MEDICATIONS  (STANDING):  ALBUTerol/ipratropium for Nebulization 3 milliLiter(s) Nebulizer every 6 hours  apixaban 2.5 milliGRAM(s) Oral every 12 hours  ascorbic acid 250 milliGRAM(s) Oral daily  aspirin enteric coated 81 milliGRAM(s) Oral daily  clonazePAM Tablet 0.5 milliGRAM(s) Oral at bedtime  docusate sodium 100 milliGRAM(s) Oral two times a day  fluticasone propionate 50 MICROgram(s)/spray Nasal Spray 1 Spray(s) Both Nostrils daily  furosemide    Tablet 40 milliGRAM(s) Oral daily  levothyroxine 50 MICROGram(s) Oral daily  lisinopril 5 milliGRAM(s) Oral daily  metoprolol     tartrate 12.5 milliGRAM(s) Oral two times a day  multivitamin 1 Tablet(s) Oral daily  pantoprazole    Tablet 40 milliGRAM(s) Oral before breakfast  senna 2 Tablet(s) Oral at bedtime  simvastatin 40 milliGRAM(s) Oral at bedtime    MEDICATIONS  (PRN):  acetaminophen   Tablet. 650 milliGRAM(s) Oral every 6 hours PRN Mild Pain (1 - 3)  acetaminophen 300 mG/codeine 30 mG 2 Tablet(s) Oral every 4 hours PRN Moderate Pain (4 - 6)      __________________________________________________  REVIEW OF SYSTEMS:    CONSTITUTIONAL: No fever,   EYES: no acute visual disturbances  NECK: No pain or stiffness  RESPIRATORY: No cough; No shortness of breath  CARDIOVASCULAR: No chest pain, no palpitations  GASTROINTESTINAL: No pain. No nausea or vomiting; No diarrhea   NEUROLOGICAL: No headache or numbness, no tremors  MUSCULOSKELETAL: No joint pain, no muscle pain  GENITOURINARY: no dysuria, no frequency, no hesitancy  PSYCHIATRY: no depression , no anxiety  ALL OTHER  ROS negative        Vital Signs Last 24 Hrs  T(C): 36.8 (03 Mar 2018 14:15), Max: 37 (02 Mar 2018 21:10)  T(F): 98.2 (03 Mar 2018 14:15), Max: 98.6 (02 Mar 2018 21:10)  HR: 115 (03 Mar 2018 14:15) (97 - 115)  BP: 112/63 (03 Mar 2018 14:15) (111/47 - 117/50)  RR: 18 (03 Mar 2018 14:15) (17 - 18)  SpO2: 100% (03 Mar 2018 14:15) (96% - 100%)    ________________________________________________  PHYSICAL EXAM:  GENERAL: NAD  HEENT:Normocephalic;  conjunctivae and sclerae clear; moist mucous membranes;   NECK : supple  CHEST/LUNG: Clear to auscultation bilaterally with good air entry   HEART: S1 S2  regular; no murmurs, gallops or rubs  ABDOMEN: Soft, Nontender, Nondistended; Bowel sounds present  EXTREMITIES: no cyanosis; no edema; no calf tenderness  NERVOUS SYSTEM:  Awake and alert; Oriented  to place, person  _________________________________________________  LABS:                        10.5   10.2  )-----------( 138      ( 03 Mar 2018 06:01 )             31.4     03-03    141  |  105  |  33<H>  ----------------------------<  110<H>  3.6   |  27  |  0.98    Ca    9.2      03 Mar 2018 06:01  Phos  3.3     03-03  Mg     2.5     03-03        Urinalysis Basic - ( 02 Mar 2018 23:27 )    Color: Yellow / Appearance: Slightly Turbid / S.015 / pH: x  Gluc: x / Ketone: Negative  / Bili: Negative / Urobili: Negative   Blood: x / Protein: 100 / Nitrite: Positive   Leuk Esterase: Moderate / RBC: 2-5 /HPF / WBC >50 /HPF   Sq Epi: x / Non Sq Epi: Few /HPF / Bacteria: TNTC /HPF      CAPILLARY BLOOD GLUCOSE              Plan of care was discussed with patient and /or primary care giver; all questions and concerns were addressed and care was aligned with patient's wishes.

## 2018-03-04 LAB
ANION GAP SERPL CALC-SCNC: 9 MMOL/L — SIGNIFICANT CHANGE UP (ref 5–17)
BASOPHILS # BLD AUTO: 0 K/UL — SIGNIFICANT CHANGE UP (ref 0–0.2)
BASOPHILS NFR BLD AUTO: 0.6 % — SIGNIFICANT CHANGE UP (ref 0–2)
BUN SERPL-MCNC: 32 MG/DL — HIGH (ref 7–18)
CALCIUM SERPL-MCNC: 8.8 MG/DL — SIGNIFICANT CHANGE UP (ref 8.4–10.5)
CHLORIDE SERPL-SCNC: 107 MMOL/L — SIGNIFICANT CHANGE UP (ref 96–108)
CO2 SERPL-SCNC: 25 MMOL/L — SIGNIFICANT CHANGE UP (ref 22–31)
CREAT SERPL-MCNC: 1.07 MG/DL — SIGNIFICANT CHANGE UP (ref 0.5–1.3)
EOSINOPHIL # BLD AUTO: 0.1 K/UL — SIGNIFICANT CHANGE UP (ref 0–0.5)
EOSINOPHIL NFR BLD AUTO: 1.4 % — SIGNIFICANT CHANGE UP (ref 0–6)
GLUCOSE SERPL-MCNC: 174 MG/DL — HIGH (ref 70–99)
HCT VFR BLD CALC: 31.7 % — LOW (ref 34.5–45)
HGB BLD-MCNC: 10.1 G/DL — LOW (ref 11.5–15.5)
LACTATE SERPL-SCNC: 1.7 MMOL/L — SIGNIFICANT CHANGE UP (ref 0.7–2)
LYMPHOCYTES # BLD AUTO: 0.6 K/UL — LOW (ref 1–3.3)
LYMPHOCYTES # BLD AUTO: 7.7 % — LOW (ref 13–44)
MAGNESIUM SERPL-MCNC: 2.2 MG/DL — SIGNIFICANT CHANGE UP (ref 1.6–2.6)
MCHC RBC-ENTMCNC: 30.9 PG — SIGNIFICANT CHANGE UP (ref 27–34)
MCHC RBC-ENTMCNC: 31.8 GM/DL — LOW (ref 32–36)
MCV RBC AUTO: 97.1 FL — SIGNIFICANT CHANGE UP (ref 80–100)
MONOCYTES # BLD AUTO: 1.1 K/UL — HIGH (ref 0–0.9)
MONOCYTES NFR BLD AUTO: 13.9 % — SIGNIFICANT CHANGE UP (ref 2–14)
NEUTROPHILS # BLD AUTO: 5.9 K/UL — SIGNIFICANT CHANGE UP (ref 1.8–7.4)
NEUTROPHILS NFR BLD AUTO: 76.4 % — SIGNIFICANT CHANGE UP (ref 43–77)
PHOSPHATE SERPL-MCNC: 3.4 MG/DL — SIGNIFICANT CHANGE UP (ref 2.5–4.5)
PLATELET # BLD AUTO: 161 K/UL — SIGNIFICANT CHANGE UP (ref 150–400)
POTASSIUM SERPL-MCNC: 3.5 MMOL/L — SIGNIFICANT CHANGE UP (ref 3.5–5.3)
POTASSIUM SERPL-SCNC: 3.5 MMOL/L — SIGNIFICANT CHANGE UP (ref 3.5–5.3)
RBC # BLD: 3.26 M/UL — LOW (ref 3.8–5.2)
RBC # FLD: 13.4 % — SIGNIFICANT CHANGE UP (ref 10.3–14.5)
SODIUM SERPL-SCNC: 141 MMOL/L — SIGNIFICANT CHANGE UP (ref 135–145)
WBC # BLD: 7.7 K/UL — SIGNIFICANT CHANGE UP (ref 3.8–10.5)
WBC # FLD AUTO: 7.7 K/UL — SIGNIFICANT CHANGE UP (ref 3.8–10.5)

## 2018-03-04 RX ORDER — SODIUM CHLORIDE 9 MG/ML
250 INJECTION INTRAMUSCULAR; INTRAVENOUS; SUBCUTANEOUS ONCE
Qty: 0 | Refills: 0 | Status: COMPLETED | OUTPATIENT
Start: 2018-03-04 | End: 2018-03-04

## 2018-03-04 RX ORDER — CIPROFLOXACIN LACTATE 400MG/40ML
250 VIAL (ML) INTRAVENOUS
Qty: 0 | Refills: 0 | Status: DISCONTINUED | OUTPATIENT
Start: 2018-03-04 | End: 2018-03-07

## 2018-03-04 RX ADMIN — Medication 3 MILLILITER(S): at 21:22

## 2018-03-04 RX ADMIN — Medication 50 MICROGRAM(S): at 05:34

## 2018-03-04 RX ADMIN — Medication 250 MILLIGRAM(S): at 12:03

## 2018-03-04 RX ADMIN — SIMVASTATIN 40 MILLIGRAM(S): 20 TABLET, FILM COATED ORAL at 21:47

## 2018-03-04 RX ADMIN — APIXABAN 2.5 MILLIGRAM(S): 2.5 TABLET, FILM COATED ORAL at 05:35

## 2018-03-04 RX ADMIN — Medication 1 SPRAY(S): at 12:04

## 2018-03-04 RX ADMIN — SENNA PLUS 2 TABLET(S): 8.6 TABLET ORAL at 21:47

## 2018-03-04 RX ADMIN — Medication 100 MILLIGRAM(S): at 05:34

## 2018-03-04 RX ADMIN — APIXABAN 2.5 MILLIGRAM(S): 2.5 TABLET, FILM COATED ORAL at 18:06

## 2018-03-04 RX ADMIN — Medication 650 MILLIGRAM(S): at 22:45

## 2018-03-04 RX ADMIN — Medication 1 TABLET(S): at 12:04

## 2018-03-04 RX ADMIN — PANTOPRAZOLE SODIUM 40 MILLIGRAM(S): 20 TABLET, DELAYED RELEASE ORAL at 05:35

## 2018-03-04 RX ADMIN — Medication 81 MILLIGRAM(S): at 12:04

## 2018-03-04 RX ADMIN — Medication 100 MILLIGRAM(S): at 18:06

## 2018-03-04 RX ADMIN — LISINOPRIL 5 MILLIGRAM(S): 2.5 TABLET ORAL at 05:34

## 2018-03-04 RX ADMIN — Medication 0.5 MILLIGRAM(S): at 21:47

## 2018-03-04 RX ADMIN — SODIUM CHLORIDE 1000 MILLILITER(S): 9 INJECTION INTRAMUSCULAR; INTRAVENOUS; SUBCUTANEOUS at 15:45

## 2018-03-04 RX ADMIN — Medication 3 MILLILITER(S): at 14:21

## 2018-03-04 RX ADMIN — Medication 3 MILLILITER(S): at 09:41

## 2018-03-04 RX ADMIN — Medication 12.5 MILLIGRAM(S): at 05:34

## 2018-03-04 RX ADMIN — Medication 650 MILLIGRAM(S): at 21:48

## 2018-03-04 RX ADMIN — Medication 250 MILLIGRAM(S): at 18:06

## 2018-03-04 RX ADMIN — Medication 40 MILLIGRAM(S): at 05:34

## 2018-03-04 NOTE — PROGRESS NOTE ADULT - SUBJECTIVE AND OBJECTIVE BOX
Subjective:  pt seen and examined, no complaints on exam.   ROS neg     acetaminophen   Tablet. 650 milliGRAM(s) Oral every 6 hours PRN  acetaminophen 300 mG/codeine 30 mG 2 Tablet(s) Oral every 4 hours PRN  ALBUTerol/ipratropium for Nebulization 3 milliLiter(s) Nebulizer every 6 hours  apixaban 2.5 milliGRAM(s) Oral every 12 hours  ascorbic acid 250 milliGRAM(s) Oral daily  aspirin enteric coated 81 milliGRAM(s) Oral daily  clonazePAM Tablet 0.5 milliGRAM(s) Oral at bedtime  docusate sodium 100 milliGRAM(s) Oral two times a day  fluticasone propionate 50 MICROgram(s)/spray Nasal Spray 1 Spray(s) Both Nostrils daily  furosemide    Tablet 40 milliGRAM(s) Oral daily  levothyroxine 50 MICROGram(s) Oral daily  lisinopril 5 milliGRAM(s) Oral daily  metoprolol     tartrate 12.5 milliGRAM(s) Oral two times a day  multivitamin 1 Tablet(s) Oral daily  pantoprazole    Tablet 40 milliGRAM(s) Oral before breakfast  senna 2 Tablet(s) Oral at bedtime  simvastatin 40 milliGRAM(s) Oral at bedtime                            10.5   10.2  )-----------( 138      ( 03 Mar 2018 06:01 )             31.4       Hemoglobin: 10.5 g/dL (03-03 @ 06:01)  Hemoglobin: 11.4 g/dL (03-02 @ 06:28)  Hemoglobin: 12.2 g/dL (03-01 @ 07:00)  Hemoglobin: 12.6 g/dL (02-28 @ 00:30)      03-03    141  |  105  |  33<H>  ----------------------------<  110<H>  3.6   |  27  |  0.98    Ca    9.2      03 Mar 2018 06:01  Phos  3.3     03-03  Mg     2.5     03-03      Creatinine Trend: 0.98<--, 1.06<--, 0.94<--, 1.14<--    COAGS:           T(C): 36.8 (03-03-18 @ 21:55), Max: 36.8 (03-03-18 @ 14:15)  HR: 97 (03-03-18 @ 21:55) (97 - 115)  BP: 120/71 (03-03-18 @ 21:55) (111/47 - 120/71)  RR: 16 (03-03-18 @ 21:55) (16 - 18)  SpO2: 100% (03-03-18 @ 21:55) (100% - 100%)  Wt(kg): --    I&O's Summary       HEENT:   Normal oral mucosa, PERRL, EOMI	  Lymphatic: No lymphadenopathy , no edema  Cardiovascular: Normal S1 S2, No JVD, No murmurs , Peripheral pulses palpable 2+ bilaterally  Respiratory: Lungs clear to auscultation, normal effort 	  Gastrointestinal:  Soft, Non-tender, + BS	    TELEMETRY:  nsr . PAC , afib 	       DIAGNOSTIC TESTING:  [ ] Echocardiogram: < from: Transthoracic Echocardiogram (11.12.17 @ 07:37) >  CONCLUSIONS:  1. Densly calcified mitral valve leaflet, mitral annulus  calcification. Mild-moderate mitral regurgitation.  2. Calcified trileaflet aortic valve with normal opening.  Mild aortic regurgitation.  3. Aortic Root: 3.3 cm.  4. Mild left atrial enlargement.  5. Moderate concentric left ventricular hypertrophy.  6. Normal Left Ventricular Systolic Function,  (EF = 55 to  60%)  7. Grade II diastolic dysfunction.  8. Normal right atrium.  9. Right ventricle not well visualized.  10. RA Pressure is 10 mm Hg.  11. RV systolic pressure is 39 mm Hg.  12. There is mild tricuspid regurgitation.  13. Small pericardial effusion. No echocardiographic  evidence of pericardial tamponade.  14. Bilateral pleural effusions.    < end of copied text >    [ ]  Catheterization:  [ ] Stress Test:    OTHER: 	  US duplex: < from: US Duplex Carotid Arteries Complete, Bilateral (03.02.18 @ 12:52) >    IMPRESSION:  No evidence of hemodynamically significant stenosis by velocity   measurements.    Measurement of carotid stenosis is based on velocity parameters that   correlate the residual internal carotid diameter with that of the more   distal vessel in accordance with a method such as the North American   Symptomatic Carotid Endarterectomy Trial (NASCET).           < end of copied text >        ASSESSMENT/PLAN: 	89y Female from home, lives with son, PMHx of Hypothyroid, HTN, HLD, CAD, Afib, COPD, and Dementia BIB EMS to ED for AMS. Afib with RVR on admission      GI / DVT prophylaxis.   keep K>4, mag >2.0  Cont A/C with eliquis ,   cont daily lasix , keep net even   ASA / statin   Rate control with low dose BB,  repeat echo pending - f/u on pericard effusion .   aspiration precaution   D/W Dr Dailey Subjective:  pt seen and examined, no complaints on exam.   ROS neg     acetaminophen   Tablet. 650 milliGRAM(s) Oral every 6 hours PRN  acetaminophen 300 mG/codeine 30 mG 2 Tablet(s) Oral every 4 hours PRN  ALBUTerol/ipratropium for Nebulization 3 milliLiter(s) Nebulizer every 6 hours  apixaban 2.5 milliGRAM(s) Oral every 12 hours  ascorbic acid 250 milliGRAM(s) Oral daily  aspirin enteric coated 81 milliGRAM(s) Oral daily  clonazePAM Tablet 0.5 milliGRAM(s) Oral at bedtime  docusate sodium 100 milliGRAM(s) Oral two times a day  fluticasone propionate 50 MICROgram(s)/spray Nasal Spray 1 Spray(s) Both Nostrils daily  furosemide    Tablet 40 milliGRAM(s) Oral daily  levothyroxine 50 MICROGram(s) Oral daily  lisinopril 5 milliGRAM(s) Oral daily  metoprolol     tartrate 12.5 milliGRAM(s) Oral two times a day  multivitamin 1 Tablet(s) Oral daily  pantoprazole    Tablet 40 milliGRAM(s) Oral before breakfast  senna 2 Tablet(s) Oral at bedtime  simvastatin 40 milliGRAM(s) Oral at bedtime                            10.5   10.2  )-----------( 138      ( 03 Mar 2018 06:01 )             31.4       Hemoglobin: 10.5 g/dL (03-03 @ 06:01)  Hemoglobin: 11.4 g/dL (03-02 @ 06:28)  Hemoglobin: 12.2 g/dL (03-01 @ 07:00)  Hemoglobin: 12.6 g/dL (02-28 @ 00:30)      03-03    141  |  105  |  33<H>  ----------------------------<  110<H>  3.6   |  27  |  0.98    Ca    9.2      03 Mar 2018 06:01  Phos  3.3     03-03  Mg     2.5     03-03      Creatinine Trend: 0.98<--, 1.06<--, 0.94<--, 1.14<--    COAGS:           T(C): 36.8 (03-03-18 @ 21:55), Max: 36.8 (03-03-18 @ 14:15)  HR: 97 (03-03-18 @ 21:55) (97 - 115)  BP: 120/71 (03-03-18 @ 21:55) (111/47 - 120/71)  RR: 16 (03-03-18 @ 21:55) (16 - 18)  SpO2: 100% (03-03-18 @ 21:55) (100% - 100%)  Wt(kg): --    I&O's Summary       HEENT:   Normal oral mucosa, PERRL, EOMI	  Lymphatic: No lymphadenopathy , no edema  Cardiovascular: Normal S1 S2, No JVD, No murmurs , Peripheral pulses palpable 2+ bilaterally  Respiratory: Lungs clear to auscultation, normal effort 	  Gastrointestinal:  Soft, Non-tender, + BS	       DIAGNOSTIC TESTING:  [ ] Echocardiogram: < from: Transthoracic Echocardiogram (11.12.17 @ 07:37) >  CONCLUSIONS:  1. Densly calcified mitral valve leaflet, mitral annulus  calcification. Mild-moderate mitral regurgitation.  2. Calcified trileaflet aortic valve with normal opening.  Mild aortic regurgitation.  3. Aortic Root: 3.3 cm.  4. Mild left atrial enlargement.  5. Moderate concentric left ventricular hypertrophy.  6. Normal Left Ventricular Systolic Function,  (EF = 55 to  60%)  7. Grade II diastolic dysfunction.  8. Normal right atrium.  9. Right ventricle not well visualized.  10. RA Pressure is 10 mm Hg.  11. RV systolic pressure is 39 mm Hg.  12. There is mild tricuspid regurgitation.  13. Small pericardial effusion. No echocardiographic  evidence of pericardial tamponade.  14. Bilateral pleural effusions.    < end of copied text >    [ ]  Catheterization:  [ ] Stress Test:    OTHER: 	  US duplex: < from: US Duplex Carotid Arteries Complete, Bilateral (03.02.18 @ 12:52) >    IMPRESSION:  No evidence of hemodynamically significant stenosis by velocity   measurements.    Measurement of carotid stenosis is based on velocity parameters that   correlate the residual internal carotid diameter with that of the more   distal vessel in accordance with a method such as the North American   Symptomatic Carotid Endarterectomy Trial (NASCET).           < end of copied text >        ASSESSMENT/PLAN: 	89y Female from home, lives with son, PMHx of Hypothyroid, HTN, HLD, CAD, Afib, COPD, and Dementia BIB EMS to ED for AMS. Afib with RVR on admission      GI / DVT prophylaxis.   keep K>4, mag >2.0  Cont A/C with eliquis ,   cont daily lasix , keep net even   ASA / statin   Rate control with low dose BB,  repeat echo pending - f/u on pericard effusion .   aspiration precaution   D/W Dr Dailey

## 2018-03-04 NOTE — PROGRESS NOTE ADULT - ATTENDING COMMENTS
Patient seen and examined, agree with above assessment and plan as transcribed above.    - F/u repeat echo    Darin Dailey MD, FACC  [facility], [facility]  [address] [place] [address]  PHONE:  [phone]  BEEPER : [phone]

## 2018-03-04 NOTE — PROGRESS NOTE ADULT - SUBJECTIVE AND OBJECTIVE BOX
Patient is a 89y old  Female who presents with a chief complaint of AMS (2018 11:22)/frequent fall/uti      INTERVAL HPI/OVERNIGHT EVENTS:  T(C): 37.1 (18 @ 05:32), Max: 37.1 (18 @ 05:32)  HR: 98 (18 @ 05:32) (97 - 115)  BP: 118/56 (18 @ 05:32) (112/63 - 120/71)  RR: 17 (18 @ 05:32) (16 - 18)  SpO2: 98% (18 @ 05:32) (98% - 100%)  Wt(kg): --    LABS:                        10.5   10.2  )-----------( 138      ( 03 Mar 2018 06:01 )             31.4     03-03    141  |  105  |  33<H>  ----------------------------<  110<H>  3.6   |  27  |  0.98    Ca    9.2      03 Mar 2018 06:01  Phos  3.3     03-03  Mg     2.5     03-03        Urinalysis Basic - ( 02 Mar 2018 23:27 )    Color: Yellow / Appearance: Slightly Turbid / S.015 / pH: x  Gluc: x / Ketone: Negative  / Bili: Negative / Urobili: Negative   Blood: x / Protein: 100 / Nitrite: Positive   Leuk Esterase: Moderate / RBC: 2-5 /HPF / WBC >50 /HPF   Sq Epi: x / Non Sq Epi: Few /HPF / Bacteria: TNTC /HPF      CAPILLARY BLOOD GLUCOSE            RADIOLOGY & ADDITIONAL TESTS:    Consultant(s) Notes Reviewed:  [x ] YES  [ ] NO    PHYSICAL EXAM:  GENERAL: well built, well nourished  HEAD:  Atraumatic, Normocephalic  EYES: EOMI, PERRLA, conjunctiva and sclera clear  ENT: No tonsillar erythema, exudates, or enlargement; Moist mucous membranes, Good dentition, No lesions  NECK: Supple, No JVD, Normal thyroid, no enlarged nodes  NERVOUS SYSTEM:  confused, not follow command  CHEST/LUNG: B/L good air entry; No rales, rhonchi, or wheezing  HEART: S1S2 irregular  ABDOMEN: Soft, Nontender, Nondistended; Bowel sounds present  EXTREMITIES:  2+ Peripheral Pulses, No clubbing, cyanosis, or edema  LYMPH: No lymphadenopathy noted  SKIN: No rashes or lesions    Care Discussed with Consultants/Other Providers [ x] YES  [ ] NO

## 2018-03-04 NOTE — PROVIDER CONTACT NOTE (OTHER) - ACTION/TREATMENT ORDERED:
lisinopril 5mG po discontinued; no further interventions at this time; continue to monitor routinely

## 2018-03-05 LAB
ANION GAP SERPL CALC-SCNC: 11 MMOL/L — SIGNIFICANT CHANGE UP (ref 5–17)
BUN SERPL-MCNC: 24 MG/DL — HIGH (ref 7–18)
CALCIUM SERPL-MCNC: 8.8 MG/DL — SIGNIFICANT CHANGE UP (ref 8.4–10.5)
CHLORIDE SERPL-SCNC: 109 MMOL/L — HIGH (ref 96–108)
CO2 SERPL-SCNC: 24 MMOL/L — SIGNIFICANT CHANGE UP (ref 22–31)
CREAT SERPL-MCNC: 0.82 MG/DL — SIGNIFICANT CHANGE UP (ref 0.5–1.3)
GLUCOSE SERPL-MCNC: 96 MG/DL — SIGNIFICANT CHANGE UP (ref 70–99)
HCT VFR BLD CALC: 31.7 % — LOW (ref 34.5–45)
HGB BLD-MCNC: 10.1 G/DL — LOW (ref 11.5–15.5)
MAGNESIUM SERPL-MCNC: 2.6 MG/DL — SIGNIFICANT CHANGE UP (ref 1.6–2.6)
MCHC RBC-ENTMCNC: 30.6 PG — SIGNIFICANT CHANGE UP (ref 27–34)
MCHC RBC-ENTMCNC: 31.7 GM/DL — LOW (ref 32–36)
MCV RBC AUTO: 96.4 FL — SIGNIFICANT CHANGE UP (ref 80–100)
PHOSPHATE SERPL-MCNC: 3.7 MG/DL — SIGNIFICANT CHANGE UP (ref 2.5–4.5)
PLATELET # BLD AUTO: 154 K/UL — SIGNIFICANT CHANGE UP (ref 150–400)
POTASSIUM SERPL-MCNC: 3.5 MMOL/L — SIGNIFICANT CHANGE UP (ref 3.5–5.3)
POTASSIUM SERPL-SCNC: 3.5 MMOL/L — SIGNIFICANT CHANGE UP (ref 3.5–5.3)
RBC # BLD: 3.29 M/UL — LOW (ref 3.8–5.2)
RBC # FLD: 12.9 % — SIGNIFICANT CHANGE UP (ref 10.3–14.5)
SODIUM SERPL-SCNC: 144 MMOL/L — SIGNIFICANT CHANGE UP (ref 135–145)
WBC # BLD: 5.9 K/UL — SIGNIFICANT CHANGE UP (ref 3.8–10.5)
WBC # FLD AUTO: 5.9 K/UL — SIGNIFICANT CHANGE UP (ref 3.8–10.5)

## 2018-03-05 RX ORDER — MORPHINE SULFATE 50 MG/1
2 CAPSULE, EXTENDED RELEASE ORAL ONCE
Qty: 0 | Refills: 0 | Status: DISCONTINUED | OUTPATIENT
Start: 2018-03-05 | End: 2018-03-05

## 2018-03-05 RX ADMIN — Medication 12.5 MILLIGRAM(S): at 05:59

## 2018-03-05 RX ADMIN — APIXABAN 2.5 MILLIGRAM(S): 2.5 TABLET, FILM COATED ORAL at 05:59

## 2018-03-05 RX ADMIN — Medication 12.5 MILLIGRAM(S): at 17:02

## 2018-03-05 RX ADMIN — Medication 3 MILLILITER(S): at 09:04

## 2018-03-05 RX ADMIN — APIXABAN 2.5 MILLIGRAM(S): 2.5 TABLET, FILM COATED ORAL at 17:02

## 2018-03-05 RX ADMIN — Medication 3 MILLILITER(S): at 14:32

## 2018-03-05 RX ADMIN — Medication 100 MILLIGRAM(S): at 17:02

## 2018-03-05 RX ADMIN — Medication 40 MILLIGRAM(S): at 06:00

## 2018-03-05 RX ADMIN — SENNA PLUS 2 TABLET(S): 8.6 TABLET ORAL at 21:24

## 2018-03-05 RX ADMIN — Medication 250 MILLIGRAM(S): at 05:59

## 2018-03-05 RX ADMIN — PANTOPRAZOLE SODIUM 40 MILLIGRAM(S): 20 TABLET, DELAYED RELEASE ORAL at 05:59

## 2018-03-05 RX ADMIN — Medication 50 MICROGRAM(S): at 06:00

## 2018-03-05 RX ADMIN — Medication 100 MILLIGRAM(S): at 06:00

## 2018-03-05 RX ADMIN — Medication 1 SPRAY(S): at 11:39

## 2018-03-05 RX ADMIN — Medication 250 MILLIGRAM(S): at 17:02

## 2018-03-05 RX ADMIN — Medication 3 MILLILITER(S): at 22:00

## 2018-03-05 RX ADMIN — Medication 0.5 MILLIGRAM(S): at 21:22

## 2018-03-05 RX ADMIN — Medication 250 MILLIGRAM(S): at 11:39

## 2018-03-05 RX ADMIN — SIMVASTATIN 40 MILLIGRAM(S): 20 TABLET, FILM COATED ORAL at 21:23

## 2018-03-05 RX ADMIN — Medication 1 TABLET(S): at 11:39

## 2018-03-05 RX ADMIN — Medication 81 MILLIGRAM(S): at 11:39

## 2018-03-05 NOTE — PROGRESS NOTE ADULT - SUBJECTIVE AND OBJECTIVE BOX
PGY 1 Note discussed with supervising resident and primary attending    Patient is a 89y old  Female who presents with a chief complaint of AMS (28 Feb 2018 11:22)      INTERVAL HPI/OVERNIGHT EVENTS: no new complaints    MEDICATIONS  (STANDING):  ALBUTerol/ipratropium for Nebulization 3 milliLiter(s) Nebulizer every 6 hours  apixaban 2.5 milliGRAM(s) Oral every 12 hours  ascorbic acid 250 milliGRAM(s) Oral daily  aspirin enteric coated 81 milliGRAM(s) Oral daily  ciprofloxacin     Tablet 250 milliGRAM(s) Oral two times a day  clonazePAM Tablet 0.5 milliGRAM(s) Oral at bedtime  docusate sodium 100 milliGRAM(s) Oral two times a day  fluticasone propionate 50 MICROgram(s)/spray Nasal Spray 1 Spray(s) Both Nostrils daily  levothyroxine 50 MICROGram(s) Oral daily  metoprolol     tartrate 12.5 milliGRAM(s) Oral two times a day  multivitamin 1 Tablet(s) Oral daily  pantoprazole    Tablet 40 milliGRAM(s) Oral before breakfast  senna 2 Tablet(s) Oral at bedtime  simvastatin 40 milliGRAM(s) Oral at bedtime    MEDICATIONS  (PRN):  acetaminophen   Tablet. 650 milliGRAM(s) Oral every 6 hours PRN Mild Pain (1 - 3)      __________________________________________________  REVIEW OF SYSTEMS:    CONSTITUTIONAL: No fever,   EYES: no acute visual disturbances  NECK: No pain or stiffness  RESPIRATORY: No cough; No shortness of breath  CARDIOVASCULAR: No chest pain, no palpitations  GASTROINTESTINAL: No pain. No nausea or vomiting; No diarrhea   NEUROLOGICAL: No headache or numbness, no tremors  MUSCULOSKELETAL: No joint pain, no muscle pain  GENITOURINARY: no dysuria, no frequency, no hesitancy  PSYCHIATRY: no depression , no anxiety  ALL OTHER  ROS negative        Vital Signs Last 24 Hrs  T(C): 36.5 (05 Mar 2018 14:03), Max: 36.7 (04 Mar 2018 22:05)  T(F): 97.7 (05 Mar 2018 14:03), Max: 98.1 (04 Mar 2018 22:05)  HR: 102 (05 Mar 2018 14:03) (78 - 102)  BP: 113/62 (05 Mar 2018 14:03) (95/43 - 118/59)  RR: 16 (05 Mar 2018 14:03) (14 - 16)  SpO2: 94% (05 Mar 2018 14:03) (94% - 98%)    ________________________________________________  PHYSICAL EXAM:  GENERAL: NAD  HEENT:Normocephalic;  conjunctivae and sclerae clear  NECK : supple  CHEST/LUNG: Clear to auscuitation bilaterally with good air entry   HEART: S1 S2  regular; no murmurs, gallops or rubs  ABDOMEN: Soft, Nontender, Nondistended; Bowel sounds present  EXTREMITIES: no cyanosis; no edema; no calf tenderness  NERVOUS SYSTEM:  Awake and alert; Oriented  to place, person, not oriented to time    _________________________________________________  LABS:                        10.1   5.9   )-----------( 154      ( 05 Mar 2018 06:39 )             31.7     03-05    144  |  109<H>  |  24<H>  ----------------------------<  96  3.5   |  24  |  0.82    Ca    8.8      05 Mar 2018 06:39  Phos  3.7     03-05  Mg     2.6     03-05          CAPILLARY BLOOD GLUCOSE            Plan of care was discussed with patient and /or primary care giver; all questions and concerns were addressed and care was aligned with patient's wishes.

## 2018-03-05 NOTE — PROGRESS NOTE ADULT - SUBJECTIVE AND OBJECTIVE BOX
Subjective:  pt seen and examined, no complaints on exam.   Hypotensive yesterday , ACE d/c      acetaminophen   Tablet. 650 milliGRAM(s) Oral every 6 hours PRN  acetaminophen 300 mG/codeine 30 mG 2 Tablet(s) Oral every 4 hours PRN  ALBUTerol/ipratropium for Nebulization 3 milliLiter(s) Nebulizer every 6 hours  apixaban 2.5 milliGRAM(s) Oral every 12 hours  ascorbic acid 250 milliGRAM(s) Oral daily  aspirin enteric coated 81 milliGRAM(s) Oral daily  ciprofloxacin     Tablet 250 milliGRAM(s) Oral two times a day  clonazePAM Tablet 0.5 milliGRAM(s) Oral at bedtime  docusate sodium 100 milliGRAM(s) Oral two times a day  fluticasone propionate 50 MICROgram(s)/spray Nasal Spray 1 Spray(s) Both Nostrils daily  furosemide    Tablet 40 milliGRAM(s) Oral daily  levothyroxine 50 MICROGram(s) Oral daily  metoprolol     tartrate 12.5 milliGRAM(s) Oral two times a day  multivitamin 1 Tablet(s) Oral daily  pantoprazole    Tablet 40 milliGRAM(s) Oral before breakfast  senna 2 Tablet(s) Oral at bedtime  simvastatin 40 milliGRAM(s) Oral at bedtime                            10.1   7.7   )-----------( 161      ( 04 Mar 2018 16:46 )             31.7       Hemoglobin: 10.1 g/dL (03-04 @ 16:46)  Hemoglobin: 10.5 g/dL (03-03 @ 06:01)  Hemoglobin: 11.4 g/dL (03-02 @ 06:28)  Hemoglobin: 12.2 g/dL (03-01 @ 07:00)      03-04    141  |  107  |  32<H>  ----------------------------<  174<H>  3.5   |  25  |  1.07    Ca    8.8      04 Mar 2018 16:46  Phos  3.4     03-04  Mg     2.2     03-04      Creatinine Trend: 1.07<--, 0.98<--, 1.06<--, 0.94<--, 1.14<--    COAGS:           T(C): 36.7 (03-04-18 @ 22:05), Max: 37.4 (03-04-18 @ 14:24)  HR: 89 (03-04-18 @ 22:05) (45 - 107)  BP: 113/57 (03-04-18 @ 22:05) (94/43 - 118/56)  RR: 16 (03-04-18 @ 22:05) (16 - 17)  SpO2: 98% (03-04-18 @ 22:05) (98% - 99%)  Wt(kg): --    I&O's Summary    04 Mar 2018 07:01  -  05 Mar 2018 04:46  --------------------------------------------------------  IN: 250 mL / OUT: 0 mL / NET: 250 mL       HEENT:   Normal oral mucosa, PERRL, EOMI	  Lymphatic: No lymphadenopathy , no edema  Cardiovascular: Normal S1 S2, No JVD, No murmurs , Peripheral pulses palpable 2+ bilaterally  Respiratory: Lungs clear to auscultation, normal effort 	  Gastrointestinal:  Soft, Non-tender, + BS	       DIAGNOSTIC TESTING:  [ ] Echocardiogram: < from: Transthoracic Echocardiogram (11.12.17 @ 07:37) >  CONCLUSIONS:  1. Densly calcified mitral valve leaflet, mitral annulus  calcification. Mild-moderate mitral regurgitation.  2. Calcified trileaflet aortic valve with normal opening.  Mild aortic regurgitation.  3. Aortic Root: 3.3 cm.  4. Mild left atrial enlargement.  5. Moderate concentric left ventricular hypertrophy.  6. Normal Left Ventricular Systolic Function,  (EF = 55 to  60%)  7. Grade II diastolic dysfunction.  8. Normal right atrium.  9. Right ventricle not well visualized.  10. RA Pressure is 10 mm Hg.  11. RV systolic pressure is 39 mm Hg.  12. There is mild tricuspid regurgitation.  13. Small pericardial effusion. No echocardiographic  evidence of pericardial tamponade.  14. Bilateral pleural effusions.    < end of copied text >      [ ]  Catheterization:  [ ] Stress Test:    OTHER: 	  US duplex: < from: US Duplex Carotid Arteries Complete, Bilateral (03.02.18 @ 12:52) >    IMPRESSION:  No evidence of hemodynamically significant stenosis by velocity   measurements.    Measurement of carotid stenosis is based on velocity parameters that   correlate the residual internal carotid diameter with that of the more   distal vessel in accordance with a method such as the North American   Symptomatic Carotid Endarterectomy Trial (NASCET).           < end of copied text >        ASSESSMENT/PLAN: 	89y Female from home, lives with son, PMHx of Hypothyroid, HTN, HLD, CAD, Afib, COPD, and Dementia BIB EMS to ED for AMS. Afib with RVR on admission      GI / DVT prophylaxis.   keep K>4, mag >2.0  Cont A/C with eliquis ,   ASA / statin   Rate control with low dose BB,   keep net even , cont daily lasix for now.  repeat echo pending - f/u on pericard effusion .   aspiration precaution   D/W Dr Dailey

## 2018-03-05 NOTE — PROGRESS NOTE ADULT - ATTENDING COMMENTS
Patient seen and examined, agree with above assessment and plan as transcribed above.    - f/u repeat echo    Darin Dailey MD, FACC  [facility], [facility]  [address] [place] [address]  PHONE:  [phone]  BEEPER : [phone]

## 2018-03-05 NOTE — PROGRESS NOTE ADULT - SUBJECTIVE AND OBJECTIVE BOX
Patient is a 89y old  Female who presents with a chief complaint of AMS /frequent fall/dementia, hypotensive, D/C lasix, D/C NH      INTERVAL HPI/OVERNIGHT EVENTS:  T(C): 36.2 (03-05-18 @ 05:20), Max: 37.4 (03-04-18 @ 14:24)  HR: 78 (03-05-18 @ 05:20) (45 - 107)  BP: 110/52 (03-05-18 @ 05:20) (94/43 - 113/57)  RR: 14 (03-05-18 @ 05:20) (14 - 16)  SpO2: 98% (03-05-18 @ 05:20) (98% - 99%)  Wt(kg): --    LABS:                        10.1   5.9   )-----------( 154      ( 05 Mar 2018 06:39 )             31.7     03-05    144  |  109<H>  |  24<H>  ----------------------------<  96  3.5   |  24  |  0.82    Ca    8.8      05 Mar 2018 06:39  Phos  3.7     03-05  Mg     2.6     03-05          CAPILLARY BLOOD GLUCOSE            RADIOLOGY & ADDITIONAL TESTS:    Consultant(s) Notes Reviewed:  [x ] YES  [ ] NO    PHYSICAL EXAM:  GENERAL: well built, well nourished  HEAD:  Atraumatic, Normocephalic  EYES: EOMI, PERRLA, conjunctiva and sclera clear  ENT: No tonsillar erythema, exudates, or enlargement; Moist mucous membranes, Good dentition, No lesions  NECK: Supple, No JVD, Normal thyroid, no enlarged nodes  NERVOUS SYSTEM:  Alert & Oriented X3, Good concentration; Motor Strength 5/5 B/L upper and lower extremities; DTRs 2+ intact and symmetric, sensory intact  CHEST/LUNG: B/L good air entry; No rales, rhonchi, or wheezing  HEART: S1S2 irregular rate controlled  ABDOMEN: Soft, Nontender, Nondistended; Bowel sounds present  EXTREMITIES:  2+ Peripheral Pulses, No clubbing, cyanosis, or edema  LYMPH: No lymphadenopathy noted  SKIN: No rashes or lesions    Care Discussed with Consultants/Other Providers [ x] YES  [ ] NO

## 2018-03-06 LAB
ANION GAP SERPL CALC-SCNC: 11 MMOL/L — SIGNIFICANT CHANGE UP (ref 5–17)
BUN SERPL-MCNC: 26 MG/DL — HIGH (ref 7–18)
CALCIUM SERPL-MCNC: 9.6 MG/DL — SIGNIFICANT CHANGE UP (ref 8.4–10.5)
CHLORIDE SERPL-SCNC: 107 MMOL/L — SIGNIFICANT CHANGE UP (ref 96–108)
CO2 SERPL-SCNC: 24 MMOL/L — SIGNIFICANT CHANGE UP (ref 22–31)
CREAT SERPL-MCNC: 0.88 MG/DL — SIGNIFICANT CHANGE UP (ref 0.5–1.3)
GLUCOSE SERPL-MCNC: 101 MG/DL — HIGH (ref 70–99)
HCT VFR BLD CALC: 32.8 % — LOW (ref 34.5–45)
HGB BLD-MCNC: 10.5 G/DL — LOW (ref 11.5–15.5)
MAGNESIUM SERPL-MCNC: 2.4 MG/DL — SIGNIFICANT CHANGE UP (ref 1.6–2.6)
MCHC RBC-ENTMCNC: 30.8 PG — SIGNIFICANT CHANGE UP (ref 27–34)
MCHC RBC-ENTMCNC: 32.1 GM/DL — SIGNIFICANT CHANGE UP (ref 32–36)
MCV RBC AUTO: 96 FL — SIGNIFICANT CHANGE UP (ref 80–100)
PHOSPHATE SERPL-MCNC: 3 MG/DL — SIGNIFICANT CHANGE UP (ref 2.5–4.5)
PLATELET # BLD AUTO: 167 K/UL — SIGNIFICANT CHANGE UP (ref 150–400)
POTASSIUM SERPL-MCNC: 3.7 MMOL/L — SIGNIFICANT CHANGE UP (ref 3.5–5.3)
POTASSIUM SERPL-SCNC: 3.7 MMOL/L — SIGNIFICANT CHANGE UP (ref 3.5–5.3)
RBC # BLD: 3.41 M/UL — LOW (ref 3.8–5.2)
RBC # FLD: 12.5 % — SIGNIFICANT CHANGE UP (ref 10.3–14.5)
SODIUM SERPL-SCNC: 142 MMOL/L — SIGNIFICANT CHANGE UP (ref 135–145)
WBC # BLD: 6.5 K/UL — SIGNIFICANT CHANGE UP (ref 3.8–10.5)
WBC # FLD AUTO: 6.5 K/UL — SIGNIFICANT CHANGE UP (ref 3.8–10.5)

## 2018-03-06 RX ADMIN — Medication 3 MILLILITER(S): at 21:02

## 2018-03-06 RX ADMIN — Medication 250 MILLIGRAM(S): at 05:16

## 2018-03-06 RX ADMIN — Medication 100 MILLIGRAM(S): at 05:18

## 2018-03-06 RX ADMIN — SIMVASTATIN 40 MILLIGRAM(S): 20 TABLET, FILM COATED ORAL at 22:07

## 2018-03-06 RX ADMIN — Medication 250 MILLIGRAM(S): at 17:25

## 2018-03-06 RX ADMIN — APIXABAN 2.5 MILLIGRAM(S): 2.5 TABLET, FILM COATED ORAL at 17:25

## 2018-03-06 RX ADMIN — Medication 50 MICROGRAM(S): at 05:16

## 2018-03-06 RX ADMIN — Medication 81 MILLIGRAM(S): at 12:08

## 2018-03-06 RX ADMIN — Medication 1 TABLET(S): at 12:07

## 2018-03-06 RX ADMIN — SENNA PLUS 2 TABLET(S): 8.6 TABLET ORAL at 22:07

## 2018-03-06 RX ADMIN — APIXABAN 2.5 MILLIGRAM(S): 2.5 TABLET, FILM COATED ORAL at 05:17

## 2018-03-06 RX ADMIN — PANTOPRAZOLE SODIUM 40 MILLIGRAM(S): 20 TABLET, DELAYED RELEASE ORAL at 05:17

## 2018-03-06 RX ADMIN — Medication 12.5 MILLIGRAM(S): at 05:17

## 2018-03-06 RX ADMIN — Medication 12.5 MILLIGRAM(S): at 17:25

## 2018-03-06 RX ADMIN — Medication 100 MILLIGRAM(S): at 17:25

## 2018-03-06 RX ADMIN — Medication 0.5 MILLIGRAM(S): at 22:07

## 2018-03-06 RX ADMIN — Medication 250 MILLIGRAM(S): at 12:08

## 2018-03-06 RX ADMIN — Medication 3 MILLILITER(S): at 14:36

## 2018-03-06 RX ADMIN — Medication 1 SPRAY(S): at 12:07

## 2018-03-06 RX ADMIN — Medication 3 MILLILITER(S): at 09:22

## 2018-03-06 NOTE — DIETITIAN INITIAL EVALUATION ADULT. - PROBLEM SELECTOR PLAN 1
likely secondary to infectious etiology versus worsenig dementia.  CXR s/o No consolidation or infiltrate.  No pleural effusion., UA negative, RVP negative, abdominal exam beingn except for mild distension, non tender, BS+  fall precautions  hx of multiple falls, will get Xray of hip to r/o fracture  aspiration precautions  will continue with home dose of klonopin 0.5mg HS  social work consult   PT consult for NH placement per MD

## 2018-03-06 NOTE — PROGRESS NOTE ADULT - SUBJECTIVE AND OBJECTIVE BOX
PGY 1 Note discussed with supervising resident and primary attending    Patient is a 89y old  Female who presents with a chief complaint of AMS (28 Feb 2018 11:22)      INTERVAL HPI/OVERNIGHT EVENTS: no new complaints; pt had chest pain at night, however resolved when overnight resident arrived, EKG NSR, no cardiac enzymes drawn    MEDICATIONS  (STANDING):  ALBUTerol/ipratropium for Nebulization 3 milliLiter(s) Nebulizer every 6 hours  apixaban 2.5 milliGRAM(s) Oral every 12 hours  ascorbic acid 250 milliGRAM(s) Oral daily  aspirin enteric coated 81 milliGRAM(s) Oral daily  clonazePAM Tablet 0.5 milliGRAM(s) Oral at bedtime  docusate sodium 100 milliGRAM(s) Oral two times a day  fluticasone propionate 50 MICROgram(s)/spray Nasal Spray 1 Spray(s) Both Nostrils daily  furosemide    Tablet 20 milliGRAM(s) Oral every 48 hours  levothyroxine 50 MICROGram(s) Oral daily  lisinopril 2.5 milliGRAM(s) Oral daily  metoprolol     tartrate 12.5 milliGRAM(s) Oral two times a day  multivitamin 1 Tablet(s) Oral daily  pantoprazole    Tablet 40 milliGRAM(s) Oral before breakfast  senna 2 Tablet(s) Oral at bedtime  simvastatin 40 milliGRAM(s) Oral at bedtime    MEDICATIONS  (PRN):  acetaminophen   Tablet. 650 milliGRAM(s) Oral every 6 hours PRN Mild Pain (1 - 3)      __________________________________________________  REVIEW OF SYSTEMS:    CONSTITUTIONAL: No fever,   EYES: no acute visual disturbances  NECK: No pain or stiffness  RESPIRATORY: No cough; No shortness of breath  CARDIOVASCULAR: No chest pain, no palpitations  GASTROINTESTINAL: No pain. No nausea or vomiting; No diarrhea   NEUROLOGICAL: No headache or numbness, no tremors  MUSCULOSKELETAL: No joint pain, no muscle pain  GENITOURINARY: no dysuria, no frequency, no hesitancy  PSYCHIATRY: no depression , no anxiety  ALL OTHER  ROS negative        Vital Signs Last 24 Hrs  T(C): 36.4 (07 Mar 2018 05:27), Max: 36.8 (06 Mar 2018 14:48)  T(F): 97.5 (07 Mar 2018 05:27), Max: 98.3 (06 Mar 2018 21:16)  HR: 86 (07 Mar 2018 05:27) (86 - 90)  BP: 137/66 (07 Mar 2018 05:27) (115/63 - 137/66)  RR: 18 (07 Mar 2018 05:27) (16 - 18)  SpO2: 98% (07 Mar 2018 05:27) (98% - 100%)    ________________________________________________  PHYSICAL EXAM:  GENERAL: NAD  HEENT:Normocephalic;  conjunctivae and sclerae clear  NECK : supple  CHEST/LUNG: Clear to auscuitation bilaterally with good air entry   HEART: S1 S2  regular; no murmurs, gallops or rubs  ABDOMEN: Soft, Nontender, Nondistended; Bowel sounds present  EXTREMITIES: no cyanosis; no edema; no calf tenderness  NERVOUS SYSTEM:  Awake and alert; Oriented  to place, person     _________________________________________________  LABS:                        10.5   6.5   )-----------( 167      ( 06 Mar 2018 06:58 )             32.8     03-06    142  |  107  |  26<H>  ----------------------------<  101<H>  3.7   |  24  |  0.88    Ca    9.6      06 Mar 2018 06:58  Phos  3.0     03-06  Mg     2.4     03-06          CAPILLARY BLOOD GLUCOSE          Plan of care was discussed with patient and /or primary care giver; all questions and concerns were addressed and care was aligned with patient's wishes.

## 2018-03-06 NOTE — DIETITIAN INITIAL EVALUATION ADULT. - SOURCE
chart review; spoke with family 3/5/18 for food/nutrition concerns; pt visited again today, no family available/family/significant other/other (specify)

## 2018-03-06 NOTE — DIETITIAN INITIAL EVALUATION ADULT. - FACTORS AFF FOOD INTAKE
difficulty chewing/difficulty with food procurement/preparation/[employer]ism/ethnic/cultural/personal food preferences/change in mental status/difficulty feeding self

## 2018-03-06 NOTE — PROGRESS NOTE ADULT - SUBJECTIVE AND OBJECTIVE BOX
Patient is a 89y old  Female who presents with a chief complaint of AMS/frequent fall/UTI      INTERVAL HPI/OVERNIGHT EVENTS:  T(C): 36.3 (03-06-18 @ 05:03), Max: 36.6 (03-05-18 @ 21:31)  HR: 103 (03-06-18 @ 05:03) (92 - 104)  BP: 125/53 (03-06-18 @ 05:03) (113/62 - 158/71)  RR: 18 (03-06-18 @ 05:03) (16 - 19)  SpO2: 100% (03-06-18 @ 05:03) (94% - 100%)  Wt(kg): --    LABS:                        10.5   6.5   )-----------( 167      ( 06 Mar 2018 06:58 )             32.8     03-06    142  |  107  |  26<H>  ----------------------------<  101<H>  3.7   |  24  |  0.88    Ca    9.6      06 Mar 2018 06:58  Phos  3.0     03-06  Mg     2.4     03-06          CAPILLARY BLOOD GLUCOSE            RADIOLOGY & ADDITIONAL TESTS:    Consultant(s) Notes Reviewed:  [x ] YES  [ ] NO    PHYSICAL EXAM:  GENERAL: well built, well nourished  HEAD:  Atraumatic, Normocephalic  EYES: EOMI, PERRLA, conjunctiva and sclera clear  ENT: No tonsillar erythema, exudates, or enlargement; Moist mucous membranes, Good dentition, No lesions  NECK: Supple, No JVD, Normal thyroid, no enlarged nodes  NERVOUS SYSTEM:  Alert & Oriented X3, Good concentration; Motor Strength 5/5 B/L upper and lower extremities; DTRs 2+ intact and symmetric, sensory intact  CHEST/LUNG: B/L good air entry; No rales, rhonchi, or wheezing  HEART: S1S2 irregular rate controlled  ABDOMEN: Soft, Nontender, Nondistended; Bowel sounds present  EXTREMITIES:  2+ Peripheral Pulses, No clubbing, cyanosis, or edema  LYMPH: No lymphadenopathy noted  SKIN: No rashes or lesions    Care Discussed with Consultants/Other Providers [ x] YES  [ ] NO

## 2018-03-06 NOTE — CHART NOTE - NSCHARTNOTEFT_GEN_A_CORE
Paged because pt began experiencing substernal chest tightness of moderate intensity.  EKG repeated which showed tachycardia, w/ no new S-T T wave changes, but pt just completed duoneb treatment.  Pt refused lab work, or pain meds.  CP self resolved minutes later.     Vital Signs Last 24 Hrs  T(C): 36.6 (05 Mar 2018 21:31), Max: 36.6 (05 Mar 2018 21:31)  T(F): 97.9 (05 Mar 2018 21:31), Max: 97.9 (05 Mar 2018 21:31)  HR: 104 (05 Mar 2018 23:30) (78 - 104)  BP: 158/71 (05 Mar 2018 23:30) (110/52 - 158/71)  RR: 19 (05 Mar 2018 21:31) (14 - 19)  SpO2: 97% (05 Mar 2018 21:31) (94% - 98%)

## 2018-03-06 NOTE — DIETITIAN INITIAL EVALUATION ADULT. - PROBLEM SELECTOR PLAN 2
CHADvasc score- 5  rate controlled with lopressor 12.5 mg BID  anticoagulation with eliquis 12.5mg twice daily.  EKG- NSR @89BPM , LVH, LAD, no ST T wave changes.   will get cardiology consult DR Dailey.  Need to assess if patient is a candidate to continue with anticoagulation due to hx of recurrent falls. per MD

## 2018-03-06 NOTE — DIETITIAN INITIAL EVALUATION ADULT. - PROBLEM SELECTOR PLAN 4
not in exacerbation  - afebrile, no leukocytosis   - supplemental oxygen prn , duonebs every 6 hours per MD

## 2018-03-06 NOTE — DIETITIAN INITIAL EVALUATION ADULT. - NUTRITION INTERVENTION
Meals and Snack/Medical Food Supplements/Collaboration and Referral of Nutrition Care/Feeding Assistance

## 2018-03-06 NOTE — DIETITIAN INITIAL EVALUATION ADULT. - PROBLEM SELECTOR PLAN 3
ekg- NSR @89BPM , LVH, LAD, no ST T wave changes.   will continue with aspirin, statin and lopressor  cardiology consult DR Dailey. per MD

## 2018-03-06 NOTE — PROGRESS NOTE ADULT - ATTENDING COMMENTS
Patient seen and examined, agree with above assessment and plan as transcribed above.    - Awaiting repeat echo  - no need for ischemic eval    Darin Dailey MD, FACC  [facility], [facility]  [address] [place] [address]  PHONE:  [phone]  BEEPER : [phone]

## 2018-03-06 NOTE — CHART NOTE - NSCHARTNOTEFT_GEN_A_CORE
Upon Nutritional Assessment by the Registered Dietitian your patient was determined to meet criteria / has evidence of the following diagnosis/diagnoses:          [ ]  Mild Protein Calorie Malnutrition        [ ]  Moderate Protein Calorie Malnutrition        [ X ] Severe Protein Calorie Malnutrition        [ ] Unspecified Protein Calorie Malnutrition        [ ] Underweight / BMI <19        [ ] Morbid Obesity / BMI > 40      Findings as based on:  •  Comprehensive nutrition assessment and consultation  •  Calorie counts (nutrient intake analysis)  •  Food acceptance and intake status from observations by staff  •  Follow up  •  Patient education  •  Intervention secondary to interdisciplinary rounds  •   concerns      Treatment:    The following diet has been recommended: Ensure Enlive  1can ( 240ml ) x bid ( 700 kcal, 40 g protein)       PROVIDER Section:     By signing this assessment you are acknowledging and agree with the diagnosis/diagnoses assigned by the Registered Dietitian    Comments:

## 2018-03-06 NOTE — PROGRESS NOTE ADULT - SUBJECTIVE AND OBJECTIVE BOX
Subjective:  pt seen and examined,   events noted , EKG reviewed , no new finding,   pt presently comfortable. no complaints .     acetaminophen   Tablet. 650 milliGRAM(s) Oral every 6 hours PRN  ALBUTerol/ipratropium for Nebulization 3 milliLiter(s) Nebulizer every 6 hours  apixaban 2.5 milliGRAM(s) Oral every 12 hours  ascorbic acid 250 milliGRAM(s) Oral daily  aspirin enteric coated 81 milliGRAM(s) Oral daily  ciprofloxacin     Tablet 250 milliGRAM(s) Oral two times a day  clonazePAM Tablet 0.5 milliGRAM(s) Oral at bedtime  docusate sodium 100 milliGRAM(s) Oral two times a day  fluticasone propionate 50 MICROgram(s)/spray Nasal Spray 1 Spray(s) Both Nostrils daily  levothyroxine 50 MICROGram(s) Oral daily  metoprolol     tartrate 12.5 milliGRAM(s) Oral two times a day  multivitamin 1 Tablet(s) Oral daily  pantoprazole    Tablet 40 milliGRAM(s) Oral before breakfast  senna 2 Tablet(s) Oral at bedtime  simvastatin 40 milliGRAM(s) Oral at bedtime                            10.1   5.9   )-----------( 154      ( 05 Mar 2018 06:39 )             31.7       Hemoglobin: 10.1 g/dL (03-05 @ 06:39)  Hemoglobin: 10.1 g/dL (03-04 @ 16:46)  Hemoglobin: 10.5 g/dL (03-03 @ 06:01)  Hemoglobin: 11.4 g/dL (03-02 @ 06:28)  Hemoglobin: 12.2 g/dL (03-01 @ 07:00)      03-05    144  |  109<H>  |  24<H>  ----------------------------<  96  3.5   |  24  |  0.82    Ca    8.8      05 Mar 2018 06:39  Phos  3.7     03-05  Mg     2.6     03-05      Creatinine Trend: 0.82<--, 1.07<--, 0.98<--, 1.06<--, 0.94<--, 1.14<--    COAGS:           T(C): 36.3 (03-06-18 @ 05:03), Max: 36.6 (03-05-18 @ 21:31)  HR: 103 (03-06-18 @ 05:03) (92 - 104)  BP: 125/53 (03-06-18 @ 05:03) (113/62 - 158/71)  RR: 18 (03-06-18 @ 05:03) (16 - 19)  SpO2: 100% (03-06-18 @ 05:03) (94% - 100%)  Wt(kg): --    I&O's Summary    04 Mar 2018 07:01  -  05 Mar 2018 07:00  --------------------------------------------------------  IN: 250 mL / OUT: 0 mL / NET: 250 mL       HEENT:   Normal oral mucosa, PERRL, EOMI	  Lymphatic: No lymphadenopathy , no edema  Cardiovascular: Normal S1 S2, No JVD, No murmurs , Peripheral pulses palpable 2+ bilaterally  Respiratory: Lungs clear to auscultation, normal effort 	  Gastrointestinal:  Soft, Non-tender, + BS	       DIAGNOSTIC TESTING:  [ ] Echocardiogram: < from: Transthoracic Echocardiogram (11.12.17 @ 07:37) >  CONCLUSIONS:  1. Densly calcified mitral valve leaflet, mitral annulus  calcification. Mild-moderate mitral regurgitation.  2. Calcified trileaflet aortic valve with normal opening.  Mild aortic regurgitation.  3. Aortic Root: 3.3 cm.  4. Mild left atrial enlargement.  5. Moderate concentric left ventricular hypertrophy.  6. Normal Left Ventricular Systolic Function,  (EF = 55 to  60%)  7. Grade II diastolic dysfunction.  8. Normal right atrium.  9. Right ventricle not well visualized.  10. RA Pressure is 10 mm Hg.  11. RV systolic pressure is 39 mm Hg.  12. There is mild tricuspid regurgitation.  13. Small pericardial effusion. No echocardiographic  evidence of pericardial tamponade.  14. Bilateral pleural effusions.    < end of copied text >      [ ]  Catheterization:  [ ] Stress Test:    OTHER: 	  US duplex: < from: US Duplex Carotid Arteries Complete, Bilateral (03.02.18 @ 12:52) >    IMPRESSION:  No evidence of hemodynamically significant stenosis by velocity   measurements.    Measurement of carotid stenosis is based on velocity parameters that   correlate the residual internal carotid diameter with that of the more   distal vessel in accordance with a method such as the North American   Symptomatic Carotid Endarterectomy Trial (NASCET).           < end of copied text >        ASSESSMENT/PLAN: 	89y Female from home, lives with son, PMHx of Hypothyroid, HTN, HLD, CAD, Afib, COPD, and Dementia BIB EMS to ED for AMS. Afib with RVR on admission      GI / DVT prophylaxis.   keep K>4, mag >2.0  Cont A/C with eliquis ,  for AFib   ASA / statin   Rate control with low dose BB,   keep net even , cont daily lasix for now.  repeat echo pending - f/u on pericard effusion . Pending   D/W Dr Dailey pt seen and examined,   events noted , EKG reviewed , no new finding,   pt presently comfortable. no complaints . Very confused     acetaminophen   Tablet. 650 milliGRAM(s) Oral every 6 hours PRN  ALBUTerol/ipratropium for Nebulization 3 milliLiter(s) Nebulizer every 6 hours  apixaban 2.5 milliGRAM(s) Oral every 12 hours  ascorbic acid 250 milliGRAM(s) Oral daily  aspirin enteric coated 81 milliGRAM(s) Oral daily  ciprofloxacin     Tablet 250 milliGRAM(s) Oral two times a day  clonazePAM Tablet 0.5 milliGRAM(s) Oral at bedtime  docusate sodium 100 milliGRAM(s) Oral two times a day  fluticasone propionate 50 MICROgram(s)/spray Nasal Spray 1 Spray(s) Both Nostrils daily  levothyroxine 50 MICROGram(s) Oral daily  metoprolol     tartrate 12.5 milliGRAM(s) Oral two times a day  multivitamin 1 Tablet(s) Oral daily  pantoprazole    Tablet 40 milliGRAM(s) Oral before breakfast  senna 2 Tablet(s) Oral at bedtime  simvastatin 40 milliGRAM(s) Oral at bedtime                            10.1   5.9   )-----------( 154      ( 05 Mar 2018 06:39 )             31.7       Hemoglobin: 10.1 g/dL (03-05 @ 06:39)  Hemoglobin: 10.1 g/dL (03-04 @ 16:46)  Hemoglobin: 10.5 g/dL (03-03 @ 06:01)  Hemoglobin: 11.4 g/dL (03-02 @ 06:28)  Hemoglobin: 12.2 g/dL (03-01 @ 07:00)      03-05    144  |  109<H>  |  24<H>  ----------------------------<  96  3.5   |  24  |  0.82    Ca    8.8      05 Mar 2018 06:39  Phos  3.7     03-05  Mg     2.6     03-05      Creatinine Trend: 0.82<--, 1.07<--, 0.98<--, 1.06<--, 0.94<--, 1.14<--    COAGS:           T(C): 36.3 (03-06-18 @ 05:03), Max: 36.6 (03-05-18 @ 21:31)  HR: 103 (03-06-18 @ 05:03) (92 - 104)  BP: 125/53 (03-06-18 @ 05:03) (113/62 - 158/71)  RR: 18 (03-06-18 @ 05:03) (16 - 19)  SpO2: 100% (03-06-18 @ 05:03) (94% - 100%)  Wt(kg): --    I&O's Summary    04 Mar 2018 07:01  -  05 Mar 2018 07:00  --------------------------------------------------------  IN: 250 mL / OUT: 0 mL / NET: 250 mL       HEENT:   Normal oral mucosa, PERRL, EOMI	  Lymphatic: No lymphadenopathy , no edema  Cardiovascular: Normal S1 S2, No JVD, No murmurs , Peripheral pulses palpable 2+ bilaterally  Respiratory: Lungs clear to auscultation, normal effort 	  Gastrointestinal:  Soft, Non-tender, + BS	       DIAGNOSTIC TESTING:  [ ] Echocardiogram: < from: Transthoracic Echocardiogram (11.12.17 @ 07:37) >  CONCLUSIONS:  1. Densly calcified mitral valve leaflet, mitral annulus  calcification. Mild-moderate mitral regurgitation.  2. Calcified trileaflet aortic valve with normal opening.  Mild aortic regurgitation.  3. Aortic Root: 3.3 cm.  4. Mild left atrial enlargement.  5. Moderate concentric left ventricular hypertrophy.  6. Normal Left Ventricular Systolic Function,  (EF = 55 to  60%)  7. Grade II diastolic dysfunction.  8. Normal right atrium.  9. Right ventricle not well visualized.  10. RA Pressure is 10 mm Hg.  11. RV systolic pressure is 39 mm Hg.  12. There is mild tricuspid regurgitation.  13. Small pericardial effusion. No echocardiographic  evidence of pericardial tamponade.  14. Bilateral pleural effusions.    < end of copied text >      [ ]  Catheterization:  [ ] Stress Test:    OTHER: 	  US duplex: < from: US Duplex Carotid Arteries Complete, Bilateral (03.02.18 @ 12:52) >    IMPRESSION:  No evidence of hemodynamically significant stenosis by velocity   measurements.    Measurement of carotid stenosis is based on velocity parameters that   correlate the residual internal carotid diameter with that of the more   distal vessel in accordance with a method such as the North American   Symptomatic Carotid Endarterectomy Trial (NASCET).           < end of copied text >        ASSESSMENT/PLAN: 	89y Female from home, lives with son, PMHx of Hypothyroid, HTN, HLD, CAD, Afib, COPD, and Dementia BIB EMS to ED for AMS. Afib with RVR on admission      GI / DVT prophylaxis.   keep K>4, mag >2.0  Cont A/C with eliquis ,  for AFib   ASA / statin   Rate control with low dose BB,   keep net even , cont daily lasix for now.  repeat echo pending - f/u on pericard effusion . Pending   D/W Dr Dailey

## 2018-03-06 NOTE — DIETITIAN INITIAL EVALUATION ADULT. - OTHER INFO
nutrition assessment for length of stay; lives home with family; skin intact; dislikes puree foods, tolerating chopped food better, 50% intake observed; wt data from [facility] EMR reviewed significant wt loss from 11/2017, pt was identified Malnutrition by RD then; discharge planning to skilled nursing facility noted

## 2018-03-07 RX ORDER — FUROSEMIDE 40 MG
20 TABLET ORAL DAILY
Qty: 0 | Refills: 0 | Status: DISCONTINUED | OUTPATIENT
Start: 2018-03-07 | End: 2018-03-07

## 2018-03-07 RX ORDER — FUROSEMIDE 40 MG
20 TABLET ORAL
Qty: 0 | Refills: 0 | Status: DISCONTINUED | OUTPATIENT
Start: 2018-03-07 | End: 2018-03-08

## 2018-03-07 RX ORDER — LISINOPRIL 2.5 MG/1
2.5 TABLET ORAL DAILY
Qty: 0 | Refills: 0 | Status: DISCONTINUED | OUTPATIENT
Start: 2018-03-07 | End: 2018-03-08

## 2018-03-07 RX ADMIN — Medication 100 MILLIGRAM(S): at 17:27

## 2018-03-07 RX ADMIN — Medication 250 MILLIGRAM(S): at 11:43

## 2018-03-07 RX ADMIN — Medication 1 SPRAY(S): at 11:43

## 2018-03-07 RX ADMIN — Medication 250 MILLIGRAM(S): at 06:01

## 2018-03-07 RX ADMIN — Medication 20 MILLIGRAM(S): at 11:43

## 2018-03-07 RX ADMIN — PANTOPRAZOLE SODIUM 40 MILLIGRAM(S): 20 TABLET, DELAYED RELEASE ORAL at 06:01

## 2018-03-07 RX ADMIN — Medication 12.5 MILLIGRAM(S): at 17:27

## 2018-03-07 RX ADMIN — Medication 3 MILLILITER(S): at 20:48

## 2018-03-07 RX ADMIN — Medication 0.5 MILLIGRAM(S): at 21:09

## 2018-03-07 RX ADMIN — APIXABAN 2.5 MILLIGRAM(S): 2.5 TABLET, FILM COATED ORAL at 06:01

## 2018-03-07 RX ADMIN — SENNA PLUS 2 TABLET(S): 8.6 TABLET ORAL at 21:09

## 2018-03-07 RX ADMIN — SIMVASTATIN 40 MILLIGRAM(S): 20 TABLET, FILM COATED ORAL at 21:09

## 2018-03-07 RX ADMIN — Medication 3 MILLILITER(S): at 14:58

## 2018-03-07 RX ADMIN — APIXABAN 2.5 MILLIGRAM(S): 2.5 TABLET, FILM COATED ORAL at 17:26

## 2018-03-07 RX ADMIN — Medication 12.5 MILLIGRAM(S): at 06:01

## 2018-03-07 RX ADMIN — Medication 3 MILLILITER(S): at 09:59

## 2018-03-07 RX ADMIN — Medication 1 TABLET(S): at 11:43

## 2018-03-07 RX ADMIN — LISINOPRIL 2.5 MILLIGRAM(S): 2.5 TABLET ORAL at 11:43

## 2018-03-07 RX ADMIN — Medication 81 MILLIGRAM(S): at 11:43

## 2018-03-07 RX ADMIN — Medication 50 MICROGRAM(S): at 06:01

## 2018-03-07 RX ADMIN — Medication 100 MILLIGRAM(S): at 06:01

## 2018-03-07 NOTE — PROGRESS NOTE ADULT - PROBLEM SELECTOR PLAN 7
will continue with home dose of lisinopril, lasix and lopressor with parameters   monitor BP closely.
[x] Immobilization > 24 hrs                              1  [x] Age > 60                                                         1    IMPROVE VTE Score: 2, continue with eliquis for DVT prophylaxis  GI ppx with protonix
will continue with home dose of lisinopril, lasix and lopressor with parameters   monitor BP closely.

## 2018-03-07 NOTE — PROGRESS NOTE ADULT - PROBLEM SELECTOR PROBLEM 4
COPD (chronic obstructive pulmonary disease)
Atrial fibrillation

## 2018-03-07 NOTE — PROGRESS NOTE ADULT - PROBLEM SELECTOR PLAN 8
patient has CKD stage iii based on GFR with normal baseline creatinine   patient has creatinine of 1.14 on admission  will avoid nephrotoxic medications.  f/u BMP in AM.
DIscussed GOC with patient;s son Mr. Cage who wants patient to be DNR/DNI
patient has CKD stage iii based on GFR with normal baseline creatinine   creatinine improved  f/u BMP
patient has CKD stage iii based on GFR with normal baseline creatinine   patient has creatinine of 1.14 on admission  will avoid nephrotoxic medications.  f/u BMP in AM.

## 2018-03-07 NOTE — PROGRESS NOTE ADULT - PROBLEM SELECTOR PLAN 2
CHADvasc score- 5  rate controlled with lopressor 12.5 mg BID  anticoagulation with eliquis 12.5mg twice daily.  EKG- NSR @89BPM , LVH, LAD, no ST T wave changes.   f/u cardiology consult DR Dailey.  Need to assess if patient is a candidate to continue with anticoagulation due to hx of recurrent falls.
RESOLVED
elevated white count, uncertain of etiology, no fever, vitals stable  will obtain repeat UA as pt states frequent urination and mild suprapubic tenderness on exam  CXR clear on admission  will obtain repeat CBC routine

## 2018-03-07 NOTE — PROGRESS NOTE ADULT - SUBJECTIVE AND OBJECTIVE BOX
Patient is a 89y old  Female who presents with a chief complaint of AMS (28 Feb 2018 11:22)/frequentfall/UTI      INTERVAL HPI/OVERNIGHT EVENTS:  T(C): 36.4 (03-07-18 @ 05:27), Max: 36.8 (03-06-18 @ 14:48)  HR: 86 (03-07-18 @ 05:27) (86 - 90)  BP: 137/66 (03-07-18 @ 05:27) (115/63 - 137/66)  RR: 18 (03-07-18 @ 05:27) (16 - 18)  SpO2: 98% (03-07-18 @ 05:27) (98% - 100%)  Wt(kg): --    LABS:                        10.5   6.5   )-----------( 167      ( 06 Mar 2018 06:58 )             32.8     03-06    142  |  107  |  26<H>  ----------------------------<  101<H>  3.7   |  24  |  0.88    Ca    9.6      06 Mar 2018 06:58  Phos  3.0     03-06  Mg     2.4     03-06          CAPILLARY BLOOD GLUCOSE            RADIOLOGY & ADDITIONAL TESTS:  < from: Transthoracic Echocardiogram (03.06.18 @ 09:27) >  CONCLUSIONS:  1. Densly calcified mitral valve leaflets and mitral  annulus calcification. Moderate mitral regurgitation.  2. Normal trileaflet aortic valve.  3. Aortic Root: 2.9 cm.  4. Moderate left atrial enlargement.  5. Normal left ventricular internal dimensions and wall  thicknesses.  6. Moderate global left ventricular systolic dysfunction.  7. Grade II diastolic dysfunction.  8. Normal right atrium.  9. Normal right ventricular size and function.  10. There is mild tricuspid regurgitation.  11. Normal pericardium with no pericardial effusion.    < end of copied text >    Consultant(s) Notes Reviewed:  [x ] YES  [ ] NO    PHYSICAL EXAM:  GENERAL: well built, well nourished  HEAD:  Atraumatic, Normocephalic  EYES: EOMI, PERRLA, conjunctiva and sclera clear  ENT: No tonsillar erythema, exudates, or enlargement; Moist mucous membranes, Good dentition, No lesions  NECK: Supple, No JVD, Normal thyroid, no enlarged nodes  NERVOUS SYSTEM:  confused; Motor Strength 5/5 B/L upper and lower extremities; DTRs 2+ intact and symmetric, sensory intact  CHEST/LUNG: B/L good air entry; No rales, rhonchi, or wheezing  HEART: S1S2 rate controlled  ABDOMEN: Soft, Nontender, Nondistended; Bowel sounds present  EXTREMITIES:  2+ Peripheral Pulses, No clubbing, cyanosis, or edema  LYMPH: No lymphadenopathy noted  SKIN: No rashes or lesions    Care Discussed with Consultants/Other Providers [ x] YES  [ ] NO

## 2018-03-07 NOTE — PROGRESS NOTE ADULT - PROBLEM SELECTOR PROBLEM 10
Goals of care, counseling/discussion

## 2018-03-07 NOTE — PROGRESS NOTE ADULT - PROBLEM SELECTOR PROBLEM 5
CAD (coronary artery disease)
HTN (hypertension)
CAD (coronary artery disease)

## 2018-03-07 NOTE — PROGRESS NOTE ADULT - PROBLEM SELECTOR PROBLEM 1
Encephalopathy
Progressive dementia with uncertain etiology

## 2018-03-07 NOTE — PROGRESS NOTE ADULT - ATTENDING COMMENTS
Patient seen and examined, agree with above assessment and plan as transcribed above.    - Awaiting repeat echo  - no need for ischemic eval    Darin Dailey MD, FACC  [facility], [facility], [facility]  [address] [place] [address]  PHONE:  [phone]  BEEPER : [phone] Patient seen and examined, agree with above assessment and plan as transcribed above.    - repeat echo noted, LV function now moderately reduced  - no pericardial effusion  - Not a candidate for ischemic eval  - Can reassess LV function after adequate treatment of hypothyroidism     Darin Dailey MD, [facility]  [facility], [facility]  [address] [place] [address]  PHONE:  [phone]  BEEPER : [phone]

## 2018-03-07 NOTE — PROGRESS NOTE ADULT - PROBLEM SELECTOR PROBLEM 6
COPD (chronic obstructive pulmonary disease)
CKD (chronic kidney disease), stage III
COPD (chronic obstructive pulmonary disease)

## 2018-03-07 NOTE — PROGRESS NOTE ADULT - PROBLEM SELECTOR PROBLEM 8
CKD (chronic kidney disease), stage III
Goals of care, counseling/discussion
CKD (chronic kidney disease), stage III

## 2018-03-07 NOTE — PROGRESS NOTE ADULT - SUBJECTIVE AND OBJECTIVE BOX
pt seen and examined,  pt denies chest pain or palpitation on exam   no other complaints ,     acetaminophen   Tablet. 650 milliGRAM(s) Oral every 6 hours PRN  ALBUTerol/ipratropium for Nebulization 3 milliLiter(s) Nebulizer every 6 hours  apixaban 2.5 milliGRAM(s) Oral every 12 hours  ascorbic acid 250 milliGRAM(s) Oral daily  aspirin enteric coated 81 milliGRAM(s) Oral daily  ciprofloxacin     Tablet 250 milliGRAM(s) Oral two times a day  clonazePAM Tablet 0.5 milliGRAM(s) Oral at bedtime  docusate sodium 100 milliGRAM(s) Oral two times a day  fluticasone propionate 50 MICROgram(s)/spray Nasal Spray 1 Spray(s) Both Nostrils daily  levothyroxine 50 MICROGram(s) Oral daily  metoprolol     tartrate 12.5 milliGRAM(s) Oral two times a day  multivitamin 1 Tablet(s) Oral daily  pantoprazole    Tablet 40 milliGRAM(s) Oral before breakfast  senna 2 Tablet(s) Oral at bedtime  simvastatin 40 milliGRAM(s) Oral at bedtime                            10.5   6.5   )-----------( 167      ( 06 Mar 2018 06:58 )             32.8       Hemoglobin: 10.5 g/dL (03-06 @ 06:58)  Hemoglobin: 10.1 g/dL (03-05 @ 06:39)  Hemoglobin: 10.1 g/dL (03-04 @ 16:46)  Hemoglobin: 10.5 g/dL (03-03 @ 06:01)  Hemoglobin: 11.4 g/dL (03-02 @ 06:28)      03-06    142  |  107  |  26<H>  ----------------------------<  101<H>  3.7   |  24  |  0.88    Ca    9.6      06 Mar 2018 06:58  Phos  3.0     03-06  Mg     2.4     03-06      Creatinine Trend: 0.88<--, 0.82<--, 1.07<--, 0.98<--, 1.06<--, 0.94<--    COAGS:           T(C): 36.8 (03-06-18 @ 21:16), Max: 36.8 (03-06-18 @ 14:48)  HR: 87 (03-06-18 @ 21:16) (87 - 103)  BP: 133/58 (03-06-18 @ 21:16) (115/63 - 133/58)  RR: 16 (03-06-18 @ 21:16) (16 - 18)  SpO2: 100% (03-06-18 @ 21:16) (98% - 100%)  Wt(kg): --    I&O's Summary     HEENT:   Normal oral mucosa, PERRL, EOMI	  Lymphatic: No lymphadenopathy , no edema  Cardiovascular: Normal S1 S2, No JVD, No murmurs , Peripheral pulses palpable 2+ bilaterally  Respiratory: Lungs clear to auscultation, normal effort 	  Gastrointestinal:  Soft, Non-tender, + BS	       DIAGNOSTIC TESTING:  [ ] Echocardiogram: < from: Transthoracic Echocardiogram (11.12.17 @ 07:37) >  CONCLUSIONS:  1. Densly calcified mitral valve leaflet, mitral annulus  calcification. Mild-moderate mitral regurgitation.  2. Calcified trileaflet aortic valve with normal opening.  Mild aortic regurgitation.  3. Aortic Root: 3.3 cm.  4. Mild left atrial enlargement.  5. Moderate concentric left ventricular hypertrophy.  6. Normal Left Ventricular Systolic Function,  (EF = 55 to  60%)  7. Grade II diastolic dysfunction.  8. Normal right atrium.  9. Right ventricle not well visualized.  10. RA Pressure is 10 mm Hg.  11. RV systolic pressure is 39 mm Hg.  12. There is mild tricuspid regurgitation.  13. Small pericardial effusion. No echocardiographic  evidence of pericardial tamponade.  14. Bilateral pleural effusions.    < end of copied text >    < from: Transthoracic Echocardiogram (03.06.18 @ 09:27) >  ------------------------------------------------------------------------  CONCLUSIONS:  1. Densly calcified mitral valve leaflets and mitral  annulus calcification. Moderate mitral regurgitation.  2. Normal trileaflet aortic valve.  3. Aortic Root: 2.9 cm.  4. Moderate left atrial enlargement.  5. Normal left ventricular internal dimensions and wall  thicknesses.  6. Moderate global left ventricular systolic dysfunction.  7. Grade II diastolic dysfunction.  8. Normal right atrium.  9. Normal right ventricular size and function.  10. There is mild tricuspid regurgitation.  11. Normal pericardium with no pericardial effusio    < end of copied text >      [ ]  Catheterization:  [ ] Stress Test:    OTHER: 	  US duplex: < from: US Duplex Carotid Arteries Complete, Bilateral (03.02.18 @ 12:52) >    IMPRESSION:  No evidence of hemodynamically significant stenosis by velocity   measurements.    Measurement of carotid stenosis is based on velocity parameters that   correlate the residual internal carotid diameter with that of the more   distal vessel in accordance with a method such as the North American   Symptomatic Carotid Endarterectomy Trial (NASCET).           < end of copied text >        ASSESSMENT/PLAN: 	89y Female from home, lives with son, PMHx of Hypothyroid, HTN, HLD, CAD, Afib, COPD, and Dementia BIB EMS to ED for AMS. Afib with RVR on admission      GI / DVT prophylaxis.   keep K>4, mag >2.0  Cont A/C with eliquis ,  for AFib   ASA / statin   Rate control with low dose BB,   keep net even , cont daily lasix for now.  repeat echo : no effusion . but with new LV dfx   D/W Dr Dailey

## 2018-03-07 NOTE — PROGRESS NOTE ADULT - PROBLEM SELECTOR PLAN 10
DIscussed GOC with patient;s son Mr. Cage who wants patient to be DNR/DNI

## 2018-03-07 NOTE — PROGRESS NOTE ADULT - PROBLEM SELECTOR PLAN 9
[x] Immobilization > 24 hrs                              1  [x] Age > 60                                                         1    IMPROVE VTE Score: 2, continue with eliquis for DVT prophylaxis  GI ppx with protonix

## 2018-03-07 NOTE — PROGRESS NOTE ADULT - PROBLEM SELECTOR PLAN 3
ekg- NSR @89BPM , LVH, LAD, no ST T wave changes.   will continue with aspirin, statin and lopressor  cardiology consult DR Dailey.
pt has history of hypothyroidism, on synthroid, may be reason for mental status changes, however seems to be waxing/waning progressive dementia as per family at bedside  c/w synthroid 50mcg  thyroid nodule seen on CT head/neck, no further workup as this time
pt has history of hypothyroidism, on synthroid, may be reason for mental status changes, however seems to be waxing/waning progressive dementia as per family at bedside  c/w synthroid 50mcg  thyroid nodule seen on CT head/neck, no further workup as this time  f/u TSH in 6 weeks upon discharge
pt has history of hypothyroidism, on synthroid, may be reason for mental status changes, however seems to be waxing/waning progressive dementia as per family at bedside  c/w synthroid 50mcg  thyroid nodule seen on CT head/neck, no further workup as this time

## 2018-03-07 NOTE — PROGRESS NOTE ADULT - PROBLEM SELECTOR PROBLEM 7
HTN (hypertension)
Prophylactic measure
HTN (hypertension)

## 2018-03-07 NOTE — PROGRESS NOTE ADULT - PROBLEM SELECTOR PLAN 6
not in exacerbation  - supplemental oxygen prn , duonebs every 6 hours
patient has CKD stage iii based on GFR with normal baseline creatinine   patient has creatinine of 1.14 on admission  will avoid nephrotoxic medications.  f/u BMP in AM.
not in exacerbation  - supplemental oxygen prn , duonebs every 6 hours

## 2018-03-07 NOTE — PROGRESS NOTE ADULT - SUBJECTIVE AND OBJECTIVE BOX
PGY 1 Note discussed with supervising resident and primary attending    Patient is a 89y old  Female who presents with a chief complaint of AMS (28 Feb 2018 11:22)      INTERVAL HPI/OVERNIGHT EVENTS: no new complaints    MEDICATIONS  (STANDING):  ALBUTerol/ipratropium for Nebulization 3 milliLiter(s) Nebulizer every 6 hours  apixaban 2.5 milliGRAM(s) Oral every 12 hours  ascorbic acid 250 milliGRAM(s) Oral daily  aspirin enteric coated 81 milliGRAM(s) Oral daily  clonazePAM Tablet 0.5 milliGRAM(s) Oral at bedtime  docusate sodium 100 milliGRAM(s) Oral two times a day  fluticasone propionate 50 MICROgram(s)/spray Nasal Spray 1 Spray(s) Both Nostrils daily  furosemide    Tablet 20 milliGRAM(s) Oral every 48 hours  levothyroxine 50 MICROGram(s) Oral daily  lisinopril 2.5 milliGRAM(s) Oral daily  metoprolol     tartrate 12.5 milliGRAM(s) Oral two times a day  multivitamin 1 Tablet(s) Oral daily  pantoprazole    Tablet 40 milliGRAM(s) Oral before breakfast  senna 2 Tablet(s) Oral at bedtime  simvastatin 40 milliGRAM(s) Oral at bedtime    MEDICATIONS  (PRN):  acetaminophen   Tablet. 650 milliGRAM(s) Oral every 6 hours PRN Mild Pain (1 - 3)      __________________________________________________  REVIEW OF SYSTEMS:    CONSTITUTIONAL: No fever,   EYES: no acute visual disturbances  NECK: No pain or stiffness  RESPIRATORY: No cough; No shortness of breath  CARDIOVASCULAR: No chest pain, no palpitations  GASTROINTESTINAL: No pain. No nausea or vomiting; No diarrhea   NEUROLOGICAL: No headache or numbness, no tremors  MUSCULOSKELETAL: No joint pain, no muscle pain  GENITOURINARY: no dysuria, no frequency, no hesitancy  PSYCHIATRY: no depression , no anxiety  ALL OTHER  ROS negative        Vital Signs Last 24 Hrs  T(C): 36.4 (07 Mar 2018 05:27), Max: 36.8 (06 Mar 2018 14:48)  T(F): 97.5 (07 Mar 2018 05:27), Max: 98.3 (06 Mar 2018 21:16)  HR: 86 (07 Mar 2018 05:27) (86 - 90)  BP: 137/66 (07 Mar 2018 05:27) (115/63 - 137/66)  RR: 18 (07 Mar 2018 05:27) (16 - 18)  SpO2: 98% (07 Mar 2018 05:27) (98% - 100%)    ________________________________________________  PHYSICAL EXAM:  GENERAL: NAD  HEENT:Normocephalic;  conjunctivae and sclerae clear  NECK : supple  CHEST/LUNG: Clear to auscultation bilaterally with good air entry   HEART: S1 S2  regular; no murmurs, gallops or rubs  ABDOMEN: Soft, Nontender, Nondistended; Bowel sounds present  EXTREMITIES: no cyanosis; no edema; no calf tenderness  NERVOUS SYSTEM:  Awake and alert; Oriented  to place, person    _________________________________________________  LABS:                        10.5   6.5   )-----------( 167      ( 06 Mar 2018 06:58 )             32.8     03-06    142  |  107  |  26<H>  ----------------------------<  101<H>  3.7   |  24  |  0.88    Ca    9.6      06 Mar 2018 06:58  Phos  3.0     03-06  Mg     2.4     03-06          CAPILLARY BLOOD GLUCOSE                Care Discussed with Consultants : Dr. Dailey    Plan of care was discussed with patient and /or primary care giver; all questions and concerns were addressed and care was aligned with patient's wishes.

## 2018-03-07 NOTE — PROGRESS NOTE ADULT - PROBLEM SELECTOR PLAN 1
likely secondary to infectious etiology versus worsening dementia.  CXR s/o No consolidation or infiltrate.  No pleural effusion., UA negative, RVP negative, abdominal exam benign except for mild distension, non tender, BS+  fall precautions  hx of multiple falls   Xray of hip - negative for fracture  aspiration precautions  c/w home dose of klonopin 0.5mg HS  social work consult - pt asking for bank information  PT consult for NH placement
likely worsening dementia, spoke with pt's son who states that there has been increased memory issues and word-finding ability in pt since 4 months ago after cardiac event  CXR s/o No consolidation or infiltrate.  No pleural effusion., UA negative, RVP negative  fall precautions, hx of multiple falls   Xray of hip - negative for fracture  aspiration precautions  c/w home dose of klonopin 0.5mg HS  social work consult - pt asking for bank information  pending placement for [facility]  f/u echo due to previous slight pericardial effusion
likely worsening dementia, spoke with pt's son who states that there has been increased memory issues and word-finding ability in pt since 4 months ago after cardiac event  CXR s/o No consolidation or infiltrate.  No pleural effusion., UA negative, RVP negative  fall precautions, hx of multiple falls   Xray of hip - negative for fracture  aspiration precautions  c/w home dose of klonopin 0.5mg HS  social work consult - pt asking for bank information  pending placement for [facility]  f/u echo due to previous slight pericardial effusion
likely worsening dementia, spoke with pt's son yesterday who states that there has been increased memory issues and word-finding ability in pt since 4 months ago after cardiac event  CXR s/o No consolidation or infiltrate.  No pleural effusion., UA negative, RVP negative  fall precautions, hx of multiple falls   Xray of hip - negative for fracture  aspiration precautions  c/w home dose of klonopin 0.5mg HS  social work consult - pt asking for bank information  pending placement for [facility]
likely worsening dementia, unlikely due to infectious/metabolic etiology.  pt's son states steady consistent decline since cardiac arrest 4 months ago  CXR s/o No consolidation or infiltrate.  No pleural effusion., UA negative, RVP negative; during hospital course, UA became positive, s/p 3 day completed antibiotic course of ciprofloxacin  fall precautions, hx of multiple falls   Xray of hip - negative for fracture  aspiration precautions  c/w home dose of klonopin 0.5mg HS  echo results noted
likely worsening dementia, spoke with pt's son yesterday who states that there has been increased memory issues and word-finding ability in pt since 4 months ago after cardiac event  CXR s/o No consolidation or infiltrate.  No pleural effusion., UA negative, RVP negative  fall precautions, hx of multiple falls   Xray of hip - negative for fracture  aspiration precautions  c/w home dose of klonopin 0.5mg HS  social work consult - pt asking for bank information  PT consult for NH placement vs rehab

## 2018-03-07 NOTE — PROGRESS NOTE ADULT - PROBLEM SELECTOR PLAN 5
ekg- NSR @89BPM , LVH, LAD, no ST T wave changes.   will continue with aspirin, statin and lopressor  cardiology consult DR Dailey.
will continue with home dose of lisinopril, lasix and lopressor with parameters   monitor BP closely.
ekg- NSR @89BPM , LVH, LAD, no ST T wave changes.   will continue with aspirin, statin and lopressor  cardiology consult DR Dailey.
ekg- NSR @89BPM , LVH, LAD, no ST T wave changes.   will continue with aspirin, statin and lopressor  echo results noted; added lasix PO 20mg q48h, and lisinopril 2.5mg PO daily

## 2018-03-07 NOTE — PROGRESS NOTE ADULT - PROBLEM SELECTOR PLAN 4
CHADvasc score- 5  rate controlled with lopressor 12.5 mg BID  anticoagulation with eliquis 12.5mg twice daily.  EKG- NSR @89BPM , LVH, LAD, no ST T wave changes.   f/u cardiology consult DR Dailey.
not in exacerbation  - afebrile, no leukocytosis   - supplemental oxygen prn , duonebs every 6 hours
CHADvasc score- 5  rate controlled with lopressor 12.5 mg BID  anticoagulation with eliquis 12.5mg twice daily.  EKG- NSR @89BPM , LVH, LAD, no ST T wave changes.   echo results noted
CHADvasc score- 5  rate controlled with lopressor 12.5 mg BID  anticoagulation with eliquis 12.5mg twice daily.  EKG- NSR @89BPM , LVH, LAD, no ST T wave changes.   f/u cardiology consult DR Dailey.

## 2018-03-08 VITALS
HEART RATE: 94 BPM | RESPIRATION RATE: 16 BRPM | OXYGEN SATURATION: 97 % | TEMPERATURE: 98 F | DIASTOLIC BLOOD PRESSURE: 55 MMHG | SYSTOLIC BLOOD PRESSURE: 137 MMHG

## 2018-03-08 PROCEDURE — 85730 THROMBOPLASTIN TIME PARTIAL: CPT

## 2018-03-08 PROCEDURE — 80053 COMPREHEN METABOLIC PANEL: CPT

## 2018-03-08 PROCEDURE — 93005 ELECTROCARDIOGRAM TRACING: CPT

## 2018-03-08 PROCEDURE — 84481 FREE ASSAY (FT-3): CPT

## 2018-03-08 PROCEDURE — 82962 GLUCOSE BLOOD TEST: CPT

## 2018-03-08 PROCEDURE — 94640 AIRWAY INHALATION TREATMENT: CPT

## 2018-03-08 PROCEDURE — 83605 ASSAY OF LACTIC ACID: CPT

## 2018-03-08 PROCEDURE — 97116 GAIT TRAINING THERAPY: CPT

## 2018-03-08 PROCEDURE — 93880 EXTRACRANIAL BILAT STUDY: CPT

## 2018-03-08 PROCEDURE — 82306 VITAMIN D 25 HYDROXY: CPT

## 2018-03-08 PROCEDURE — 85027 COMPLETE CBC AUTOMATED: CPT

## 2018-03-08 PROCEDURE — 97110 THERAPEUTIC EXERCISES: CPT

## 2018-03-08 PROCEDURE — 87086 URINE CULTURE/COLONY COUNT: CPT

## 2018-03-08 PROCEDURE — 97530 THERAPEUTIC ACTIVITIES: CPT

## 2018-03-08 PROCEDURE — 84436 ASSAY OF TOTAL THYROXINE: CPT

## 2018-03-08 PROCEDURE — 80061 LIPID PANEL: CPT

## 2018-03-08 PROCEDURE — 87798 DETECT AGENT NOS DNA AMP: CPT

## 2018-03-08 PROCEDURE — 83735 ASSAY OF MAGNESIUM: CPT

## 2018-03-08 PROCEDURE — 73521 X-RAY EXAM HIPS BI 2 VIEWS: CPT

## 2018-03-08 PROCEDURE — 36415 COLL VENOUS BLD VENIPUNCTURE: CPT

## 2018-03-08 PROCEDURE — 83036 HEMOGLOBIN GLYCOSYLATED A1C: CPT

## 2018-03-08 PROCEDURE — 84100 ASSAY OF PHOSPHORUS: CPT

## 2018-03-08 PROCEDURE — 84439 ASSAY OF FREE THYROXINE: CPT

## 2018-03-08 PROCEDURE — 99285 EMERGENCY DEPT VISIT HI MDM: CPT | Mod: 25

## 2018-03-08 PROCEDURE — 85610 PROTHROMBIN TIME: CPT

## 2018-03-08 PROCEDURE — 80048 BASIC METABOLIC PNL TOTAL CA: CPT

## 2018-03-08 PROCEDURE — 81001 URINALYSIS AUTO W/SCOPE: CPT

## 2018-03-08 PROCEDURE — 84443 ASSAY THYROID STIM HORMONE: CPT

## 2018-03-08 PROCEDURE — 87633 RESP VIRUS 12-25 TARGETS: CPT

## 2018-03-08 PROCEDURE — 70450 CT HEAD/BRAIN W/O DYE: CPT

## 2018-03-08 PROCEDURE — 87581 M.PNEUMON DNA AMP PROBE: CPT

## 2018-03-08 PROCEDURE — 87486 CHLMYD PNEUM DNA AMP PROBE: CPT

## 2018-03-08 PROCEDURE — 71045 X-RAY EXAM CHEST 1 VIEW: CPT

## 2018-03-08 PROCEDURE — 96374 THER/PROPH/DIAG INJ IV PUSH: CPT

## 2018-03-08 PROCEDURE — 72125 CT NECK SPINE W/O DYE: CPT

## 2018-03-08 PROCEDURE — 82607 VITAMIN B-12: CPT

## 2018-03-08 PROCEDURE — 93306 TTE W/DOPPLER COMPLETE: CPT

## 2018-03-08 RX ORDER — LEVOTHYROXINE SODIUM 125 MCG
1 TABLET ORAL
Qty: 30 | Refills: 0 | OUTPATIENT
Start: 2018-03-08 | End: 2018-04-06

## 2018-03-08 RX ORDER — HALOPERIDOL DECANOATE 100 MG/ML
2 INJECTION INTRAMUSCULAR ONCE
Qty: 0 | Refills: 0 | Status: DISCONTINUED | OUTPATIENT
Start: 2018-03-08 | End: 2018-03-08

## 2018-03-08 RX ORDER — FLUTICASONE PROPIONATE 50 MCG
1 SPRAY, SUSPENSION NASAL
Qty: 1.5 | Refills: 0 | OUTPATIENT
Start: 2018-03-08 | End: 2018-04-06

## 2018-03-08 RX ORDER — METOPROLOL TARTRATE 50 MG
1 TABLET ORAL
Qty: 30 | Refills: 0 | OUTPATIENT
Start: 2018-03-08 | End: 2018-04-06

## 2018-03-08 RX ORDER — ACETAMINOPHEN 500 MG
2 TABLET ORAL
Qty: 56 | Refills: 0 | OUTPATIENT
Start: 2018-03-08 | End: 2018-03-14

## 2018-03-08 RX ORDER — ASCORBIC ACID 60 MG
1 TABLET,CHEWABLE ORAL
Qty: 30 | Refills: 0 | OUTPATIENT
Start: 2018-03-08 | End: 2018-04-06

## 2018-03-08 RX ORDER — ASPIRIN/CALCIUM CARB/MAGNESIUM 324 MG
1 TABLET ORAL
Qty: 30 | Refills: 0 | OUTPATIENT
Start: 2018-03-08 | End: 2018-04-06

## 2018-03-08 RX ORDER — METOPROLOL TARTRATE 50 MG
25 TABLET ORAL DAILY
Qty: 0 | Refills: 0 | Status: DISCONTINUED | OUTPATIENT
Start: 2018-03-08 | End: 2018-03-08

## 2018-03-08 RX ORDER — SIMVASTATIN 20 MG/1
1 TABLET, FILM COATED ORAL
Qty: 30 | Refills: 0 | OUTPATIENT
Start: 2018-03-08 | End: 2018-04-06

## 2018-03-08 RX ORDER — CLONAZEPAM 1 MG
1 TABLET ORAL
Qty: 0 | Refills: 0 | COMMUNITY

## 2018-03-08 RX ORDER — PANTOPRAZOLE SODIUM 20 MG/1
1 TABLET, DELAYED RELEASE ORAL
Qty: 30 | Refills: 0 | OUTPATIENT
Start: 2018-03-08 | End: 2018-04-06

## 2018-03-08 RX ORDER — LISINOPRIL 2.5 MG/1
1 TABLET ORAL
Qty: 30 | Refills: 0 | OUTPATIENT
Start: 2018-03-08 | End: 2018-04-06

## 2018-03-08 RX ORDER — APIXABAN 2.5 MG/1
1 TABLET, FILM COATED ORAL
Qty: 60 | Refills: 0 | OUTPATIENT
Start: 2018-03-08 | End: 2018-04-06

## 2018-03-08 RX ORDER — IPRATROPIUM/ALBUTEROL SULFATE 18-103MCG
3 AEROSOL WITH ADAPTER (GRAM) INHALATION
Qty: 84 | Refills: 0 | OUTPATIENT
Start: 2018-03-08 | End: 2018-03-14

## 2018-03-08 RX ORDER — FUROSEMIDE 40 MG
1 TABLET ORAL
Qty: 15 | Refills: 0 | OUTPATIENT
Start: 2018-03-08 | End: 2018-04-06

## 2018-03-08 RX ORDER — LISINOPRIL 2.5 MG/1
1 TABLET ORAL
Qty: 0 | Refills: 0 | COMMUNITY

## 2018-03-08 RX ORDER — DOCUSATE SODIUM 100 MG
1 CAPSULE ORAL
Qty: 60 | Refills: 0 | OUTPATIENT
Start: 2018-03-08 | End: 2018-04-06

## 2018-03-08 RX ORDER — SENNA PLUS 8.6 MG/1
2 TABLET ORAL
Qty: 60 | Refills: 0 | OUTPATIENT
Start: 2018-03-08 | End: 2018-04-06

## 2018-03-08 RX ADMIN — Medication 250 MILLIGRAM(S): at 13:00

## 2018-03-08 RX ADMIN — Medication 3 MILLILITER(S): at 09:52

## 2018-03-08 RX ADMIN — Medication 1 SPRAY(S): at 13:00

## 2018-03-08 RX ADMIN — Medication 100 MILLIGRAM(S): at 05:41

## 2018-03-08 RX ADMIN — APIXABAN 2.5 MILLIGRAM(S): 2.5 TABLET, FILM COATED ORAL at 05:42

## 2018-03-08 RX ADMIN — Medication 50 MICROGRAM(S): at 05:41

## 2018-03-08 RX ADMIN — Medication 12.5 MILLIGRAM(S): at 05:41

## 2018-03-08 RX ADMIN — Medication 1 TABLET(S): at 13:01

## 2018-03-08 RX ADMIN — PANTOPRAZOLE SODIUM 40 MILLIGRAM(S): 20 TABLET, DELAYED RELEASE ORAL at 05:40

## 2018-03-08 RX ADMIN — LISINOPRIL 2.5 MILLIGRAM(S): 2.5 TABLET ORAL at 05:41

## 2018-03-08 RX ADMIN — Medication 81 MILLIGRAM(S): at 13:01

## 2018-03-08 NOTE — PROGRESS NOTE ADULT - SUBJECTIVE AND OBJECTIVE BOX
Patient denies CP, Breathing comfortable  ROS (-)    acetaminophen   Tablet. 650 milliGRAM(s) Oral every 6 hours PRN  ALBUTerol/ipratropium for Nebulization 3 milliLiter(s) Nebulizer every 6 hours  apixaban 2.5 milliGRAM(s) Oral every 12 hours  ascorbic acid 250 milliGRAM(s) Oral daily  aspirin enteric coated 81 milliGRAM(s) Oral daily  docusate sodium 100 milliGRAM(s) Oral two times a day  fluticasone propionate 50 MICROgram(s)/spray Nasal Spray 1 Spray(s) Both Nostrils daily  furosemide    Tablet 20 milliGRAM(s) Oral every 48 hours  levothyroxine 50 MICROGram(s) Oral daily  lisinopril 2.5 milliGRAM(s) Oral daily  metoprolol     tartrate 12.5 milliGRAM(s) Oral two times a day  multivitamin 1 Tablet(s) Oral daily  pantoprazole    Tablet 40 milliGRAM(s) Oral before breakfast  senna 2 Tablet(s) Oral at bedtime  simvastatin 40 milliGRAM(s) Oral at bedtime          Hemoglobin: 10.5 g/dL (03-06 @ 06:58)  Hemoglobin: 10.1 g/dL (03-05 @ 06:39)  Hemoglobin: 10.1 g/dL (03-04 @ 16:46)            Creatinine Trend: 0.88<--, 0.82<--, 1.07<--, 0.98<--, 1.06<--, 0.94<--    COAGS:           T(C): 36.4 (03-08-18 @ 06:03), Max: 36.7 (03-07-18 @ 21:25)  HR: 85 (03-08-18 @ 06:03) (85 - 98)  BP: 124/59 (03-08-18 @ 06:03) (124/59 - 144/68)  RR: 17 (03-08-18 @ 06:03) (16 - 17)  SpO2: 98% (03-08-18 @ 06:03) (96% - 99%)  Wt(kg): --    I&O's Summary       HEENT:   Normal oral mucosa, PERRL, EOMI	  Lymphatic: No lymphadenopathy , no edema  Cardiovascular: Normal S1 S2, No JVD, No murmurs , Peripheral pulses palpable 2+ bilaterally  Respiratory: Lungs clear to auscultation, normal effort 	  Gastrointestinal:  Soft, Non-tender, + BS	       DIAGNOSTIC TESTING:  [ ] Echocardiogram: < from: Transthoracic Echocardiogram (11.12.17 @ 07:37) >  CONCLUSIONS:  1. Densly calcified mitral valve leaflet, mitral annulus  calcification. Mild-moderate mitral regurgitation.  2. Calcified trileaflet aortic valve with normal opening.  Mild aortic regurgitation.  3. Aortic Root: 3.3 cm.  4. Mild left atrial enlargement.  5. Moderate concentric left ventricular hypertrophy.  6. Normal Left Ventricular Systolic Function,  (EF = 55 to  60%)  7. Grade II diastolic dysfunction.  8. Normal right atrium.  9. Right ventricle not well visualized.  10. RA Pressure is 10 mm Hg.  11. RV systolic pressure is 39 mm Hg.  12. There is mild tricuspid regurgitation.  13. Small pericardial effusion. No echocardiographic  evidence of pericardial tamponade.  14. Bilateral pleural effusions.    < end of copied text >    < from: Transthoracic Echocardiogram (03.06.18 @ 09:27) >  ------------------------------------------------------------------------  CONCLUSIONS:  1. Densly calcified mitral valve leaflets and mitral  annulus calcification. Moderate mitral regurgitation.  2. Normal trileaflet aortic valve.  3. Aortic Root: 2.9 cm.  4. Moderate left atrial enlargement.  5. Normal left ventricular internal dimensions and wall  thicknesses.  6. Moderate global left ventricular systolic dysfunction.  7. Grade II diastolic dysfunction.  8. Normal right atrium.  9. Normal right ventricular size and function.  10. There is mild tricuspid regurgitation.  11. Normal pericardium with no pericardial effusio    < end of copied text >      [ ]  Catheterization:  [ ] Stress Test:    OTHER: 	  US duplex: < from: US Duplex Carotid Arteries Complete, Bilateral (03.02.18 @ 12:52) >    IMPRESSION:  No evidence of hemodynamically significant stenosis by velocity   measurements.    Measurement of carotid stenosis is based on velocity parameters that   correlate the residual internal carotid diameter with that of the more   distal vessel in accordance with a method such as the [employer] American   Symptomatic Carotid Endarterectomy Trial (NASCET).           < end of copied text >        ASSESSMENT/PLAN: 	89y Female from home, lives with son, PMHx of Hypothyroid, HTN, HLD, CAD, Afib, COPD, and Dementia BIB EMS to ED for AMS. Afib with RVR on admission     - Cont medical management  - Cont Ace. low dose lasix  - Change lopressor to toprol XL 25 mg PO daily  - Pericardial effusion resolved    Darin Dailey MD, FACC  [facility], [facility]  [address] [place] [address]  PHONE:  [phone]  BEEPER : [phone]

## 2018-03-08 NOTE — PROGRESS NOTE ADULT - PROVIDER SPECIALTY LIST ADULT
Cardiology
Internal Medicine

## 2018-03-08 NOTE — DISCHARGE NOTE ADULT - MEDICATION SUMMARY - MEDICATIONS TO CHANGE
I will SWITCH the dose or number of times a day I take the medications listed below when I get home from the hospital:    metoprolol tartrate 25 mg oral tablet  -- 0.5 tab(s) by mouth 2 times a day   -- It is very important that you take or use this exactly as directed.  Do not skip doses or discontinue unless directed by your doctor.  May cause drowsiness.  Alcohol may intensify this effect.  Use care when operating dangerous machinery.  Some non-prescription drugs may aggravate your condition.  Read all labels carefully.  If a warning appears, check with your doctor before taking.  Take with food or milk.  This drug may impair the ability to drive or operate machinery.  Use care until you become familiar with its effects.    furosemide 40 mg oral tablet  -- 1 tab(s) by mouth once a day    simvastatin 80 mg oral tablet  -- 0.5 tab(s) by mouth once a day (at bedtime)

## 2018-03-08 NOTE — PROGRESS NOTE ADULT - ASSESSMENT
89 y/o F from home, lives with son, PMHx of Hypothyroid, HTN, HLD, CAD, Afib, COPD, and Dementia BIB EMS to ED for AMS.     In ED, patient had stable vitals on admission. EKG- NSR @89BPM , LVH, LAD, no ST T wave changes. UA- negative, RVP- negative , CXR s/o No consolidation or infiltrate.  No pleural effusion. Labs pertinent for K of 3.4, CT head and CT cervical spine s/o no acute pathology, 1.2 x 1.0 cm posterior right thyroid lobe hypodense lesion.     will admit to medicine for evaluation of encephalopathy secondary to worsening dementia.
89 y/o F from home, lives with son, PMHx of Hypothyroid, HTN, HLD, CAD, Afib, COPD, and Dementia BIB EMS to ED for AMS.     In ED, patient had stable vitals on admission. EKG- NSR @89BPM , LVH, LAD, no ST T wave changes. UA- negative, RVP- negative , CXR s/o No consolidation or infiltrate.  No pleural effusion. Labs pertinent for K of 3.4, CT head and CT cervical spine s/o no acute pathology, 1.2 x 1.0 cm posterior right thyroid lobe hypodense lesion.     will admit to medicine for evaluation of encephalopathy secondary to worsening dementia. CT head/C spine no acute CVA or Fx, S/P eval, will D/C to [facility] for subacute rehab. Fall precaution, assistant ADLS. Fall precaution, uti, po abs.
89 y/o F from home, lives with son, PMHx of Hypothyroid, HTN, HLD, CAD, Afib, COPD, and Dementia BIB EMS to ED for AMS most likely due to worsening dementia and frequent fall/unsteady gait    In ED, patient had stable vitals on admission. EKG- NSR @89BPM , LVH, LAD, no ST T wave changes. UA- negative, RVP- negative , CXR s/o No consolidation or infiltrate.  No pleural effusion. Labs pertinent for K of 3.4, CT head and CT cervical spine s/o no acute pathology, 1.2 x 1.0 cm posterior right thyroid lobe hypodense lesion.     will admit to medicine for evaluation of encephalopathy secondary to worsening dementia/UTI. CT head/C spine no acute CVA or Fx, S/P eval, will D/C to [facility] for subacute rehab. Fall precaution, assistant ADLS. Fall precaution, uti, po abs for UTI. Patient improved D/C rehab today
89 y/o F from home, lives with son, PMHx of Hypothyroid, HTN, HLD, CAD, Afib, COPD, and Dementia BIB EMS to ED for AMS.     In ED, patient had stable vitals on admission. EKG- NSR @89BPM , LVH, LAD, no ST T wave changes. UA- negative, RVP- negative , CXR s/o No consolidation or infiltrate.  No pleural effusion. Labs pertinent for K of 3.4, CT head and CT cervical spine s/o no acute pathology, 1.2 x 1.0 cm posterior right thyroid lobe hypodense lesion.     will admit to medicine for evaluation of encephalopathy secondary to worsening dementia.
89 y/o F from home, lives with son, PMHx of Hypothyroid, HTN, HLD, CAD, Afib, COPD, and Dementia BIB EMS to ED for AMS.     In ED, patient had stable vitals on admission. EKG- NSR @89BPM , LVH, LAD, no ST T wave changes. UA- negative, RVP- negative , CXR s/o No consolidation or infiltrate.  No pleural effusion. Labs pertinent for K of 3.4, CT head and CT cervical spine s/o no acute pathology, 1.2 x 1.0 cm posterior right thyroid lobe hypodense lesion.     will admit to medicine for evaluation of encephalopathy secondary to worsening dementia.
89 y/o F from home, lives with son, PMHx of Hypothyroid, HTN, HLD, CAD, Afib, COPD, and Dementia BIB EMS to ED for AMS.     In ED, patient had stable vitals on admission. EKG- NSR @89BPM , LVH, LAD, no ST T wave changes. UA- negative, RVP- negative , CXR s/o No consolidation or infiltrate.  No pleural effusion. Labs pertinent for K of 3.4, CT head and CT cervical spine s/o no acute pathology, 1.2 x 1.0 cm posterior right thyroid lobe hypodense lesion.     will admit to medicine for evaluation of encephalopathy secondary to worsening dementia. CT head/C spine no acute CVA or Fx, S/P eval, will D/C to [facility] for subacute rehab. Fall precaution, assistant ADLS. Fall precaution
89 y/o F from home, lives with son, PMHx of Hypothyroid, HTN, HLD, CAD, Afib, COPD, and Dementia BIB EMS to ED for AMS.     In ED, patient had stable vitals on admission. EKG- NSR @89BPM , LVH, LAD, no ST T wave changes. UA- negative, RVP- negative , CXR s/o No consolidation or infiltrate.  No pleural effusion. Labs pertinent for K of 3.4, CT head and CT cervical spine s/o no acute pathology, 1.2 x 1.0 cm posterior right thyroid lobe hypodense lesion.     will admit to medicine for evaluation of encephalopathy secondary to worsening dementia.
89 y/o F from home, lives with son, PMHx of Hypothyroid, HTN, HLD, CAD, Afib, COPD, and Dementia BIB EMS to ED for AMS.     In ED, patient had stable vitals on admission. EKG- NSR @89BPM , LVH, LAD, no ST T wave changes. UA- negative, RVP- negative , CXR s/o No consolidation or infiltrate.  No pleural effusion. Labs pertinent for K of 3.4, CT head and CT cervical spine s/o no acute pathology, 1.2 x 1.0 cm posterior right thyroid lobe hypodense lesion.     will admit to medicine for evaluation of encephalopathy secondary to worsening dementia. CT head/C spine no acute CVA or Fx, S/P eval, will D/C to [facility] for subacute rehab. Fall precaution, assistant ADLS. Fall precaution, uti, po abs.
89 y/o F from home, lives with son, PMHx of Hypothyroid, HTN, HLD, CAD, Afib, COPD, and Dementia BIB EMS to ED for AMS.     In ED, patient had stable vitals on admission. EKG- NSR @89BPM , LVH, LAD, no ST T wave changes. UA- negative, RVP- negative , CXR s/o No consolidation or infiltrate.  No pleural effusion. Labs pertinent for K of 3.4, CT head and CT cervical spine s/o no acute pathology, 1.2 x 1.0 cm posterior right thyroid lobe hypodense lesion.     will admit to medicine for evaluation of encephalopathy secondary to worsening dementia. CT head/C spine no acute CVA or Fx, S/P eval, will D/C to [facility] for subacute rehab. Fall precaution, assistant ADLS. Fall precaution, uti, po abs.
89 y/o F from home, lives with son, PMHx of Hypothyroid, HTN, HLD, CAD, Afib, COPD, and Dementia BIB EMS to ED for AMS.     In ED, patient had stable vitals on admission. EKG- NSR @89BPM , LVH, LAD, no ST T wave changes. UA- negative, RVP- negative , CXR s/o No consolidation or infiltrate.  No pleural effusion. Labs pertinent for K of 3.4, CT head and CT cervical spine s/o no acute pathology, 1.2 x 1.0 cm posterior right thyroid lobe hypodense lesion.     will admit to medicine for evaluation of encephalopathy secondary to worsening dementia/UTI. CT head/C spine no acute CVA or Fx, S/P eval, will D/C to [facility] for subacute rehab. Fall precaution, assistant ADLS. Fall precaution, uti, po abs.
89 y/o F from home, lives with son, PMHx of Hypothyroid, HTN, HLD, CAD, Afib, COPD, and Dementia BIB EMS to ED for AMS.     In ED, patient had stable vitals on admission. EKG- NSR @89BPM , LVH, LAD, no ST T wave changes. UA- negative, RVP- negative , CXR s/o No consolidation or infiltrate.  No pleural effusion. Labs pertinent for K of 3.4, CT head and CT cervical spine s/o no acute pathology, 1.2 x 1.0 cm posterior right thyroid lobe hypodense lesion.     will admit to medicine for evaluation of worsening dementia due to encephalopathy, unlikely due to infectious/metabolic etiology
· Assessment		  89 y/o F from home, lives with son, PMHx of Hypothyroid, HTN, HLD, CAD, Afib, COPD, and Dementia BIB EMS to ED for AMS.     In ED, patient had stable vitals on admission. EKG- NSR @89BPM , LVH, LAD, no ST T wave changes. UA- negative, RVP- negative , CXR s/o No consolidation or infiltrate.  No pleural effusion. Labs pertinent for K of 3.4, CT head and CT cervical spine s/o no acute pathology, 1.2 x 1.0 cm posterior right thyroid lobe hypodense lesion.     will admit to medicine for evaluation of encephalopathy secondary to worsening dementia. CT head/C spine no acute CVA or Fx, S/P eval, will D/C to [facility] for subacute rehab. Fall precaution, assistant ADLS
89 y/o F from home, lives with son, PMHx of Hypothyroid, HTN, HLD, CAD, Afib, COPD, and Dementia BIB EMS to ED for AMS.     In ED, patient had stable vitals on admission. EKG- NSR @89BPM , LVH, LAD, no ST T wave changes. UA- negative, RVP- negative , CXR s/o No consolidation or infiltrate.  No pleural effusion. Labs pertinent for K of 3.4, CT head and CT cervical spine s/o no acute pathology, 1.2 x 1.0 cm posterior right thyroid lobe hypodense lesion.     will admit to medicine for evaluation of encephalopathy secondary to worsening dementia.

## 2018-03-08 NOTE — DISCHARGE NOTE ADULT - SECONDARY DIAGNOSIS.
HTN (hypertension) Hypothyroidism Leukocytosis COPD (chronic obstructive pulmonary disease) CAD (coronary artery disease)

## 2018-03-08 NOTE — DISCHARGE NOTE ADULT - HOSPITAL COURSE
89 y/o F from home, lives with son, PMHx of Hypothyroid, HTN, HLD, CAD, Afib, COPD, and Dementia BIB EMS to ED for AMS. In ED, patient had stable vitals on admission. EKG- NSR @89BPM , LVH, LAD, no ST T wave changes. UA- negative, RVP- negative , CXR s/o No consolidation or infiltrate.  No pleural effusion. Labs pertinent for K of 3.4, CT head and CT cervical spine s/o no acute pathology, 1.2 x 1.0 cm posterior right thyroid lobe hypodense lesion. Pt admitted to medicine for evaluation of worsening dementia due to encephalopathy, unlikely due to infectious/metabolic etiology; likely worsening dementia, pt's son states steady consistent decline since cardiac arrest 4 months ago. CXR negative, UA negative on admission, RVP negative. x-ray of hip negative for fracture. Pt placed on fall and aspiration precautions. Pt continued on home dose of klonopin 0.5mg HS. Later in hospital course, pt developed leukocytosis with positive UA and suprapubic tenderness on palpation. Pt completed 3 day course of ciprofloxacin.  Pt was found to have elevated TSH on admission. Pt has history of hypothyroidism, however was no longer taking synthroid. Pt placed on synthroid 50mcg/day. CT head/neck showed thyroid nodule, however no further workup recommended at this time. Pt to continue with synthroid and have follow-up TSH within 6 weeks of discharge.  Pt continued on lopressor 12.5mg BID and eliquis 2.5 mg BID for CHADVASC score 5 in setting of atrial fibrillation. EKG showed normal sinus rhythm at 89bpm, LVH, LAD, no ST-T-wave changes. Echocardiogram showed grade 3 diastolic dysfunction with mildly reduced LV function to 45%.  Lasix added 20mg q48h and lisinopril 2.5mg daily.   Pt continued on aspirin, statin due to history of CAD.  C discussion with patient's son: pt is DNR/DNI.     Pt is stable for discharge. Pt to follow-up TSH in 6 weeks. Pt to continue all medications as above. Discussed above with attending Dr. Li.

## 2018-03-08 NOTE — PROGRESS NOTE ADULT - NSHPATTENDINGPLANDISCUSS_GEN_ALL_CORE
resident doctor
resident doctor/nurse
resident doctor

## 2018-03-08 NOTE — DISCHARGE NOTE ADULT - MEDICATION SUMMARY - MEDICATIONS TO TAKE
I will START or STAY ON the medications listed below when I get home from the hospital:    aspirin 81 mg oral delayed release tablet  -- 1 tab(s) by mouth once a day  -- Indication: For Prophylactic measure    acetaminophen 325 mg oral tablet  -- 2 tab(s) by mouth every 6 hours, As needed, Mild Pain (1 - 3)  -- Indication: For Pain    lisinopril 2.5 mg oral tablet  -- 1 tab(s) by mouth once a day  -- Indication: For HTN (hypertension)    apixaban 2.5 mg oral tablet  -- 1 tab(s) by mouth every 12 hours  -- Indication: For Atrial fibrillation    simvastatin 40 mg oral tablet  -- 1 tab(s) by mouth once a day (at bedtime)  -- Indication: For Hyperlipidemia    metoprolol succinate 25 mg oral tablet, extended release  -- 1 tab(s) by mouth once a day  -- Indication: For Atrial fibrillation    ipratropium-albuterol 0.5 mg-2.5 mg/3 mLinhalation solution  -- 3 milliliter(s) inhaled every 6 hours, As Needed -for bronchospasm   -- Indication: For COPD (chronic obstructive pulmonary disease)    furosemide 20 mg oral tablet  -- 1 tab(s) by mouth every 48 hours  -- Indication: For Heart failure    docusate sodium 100 mg oral capsule  -- 1 cap(s) by mouth 2 times a day  -- Indication: For COnstipation    senna oral tablet  -- 2 tab(s) by mouth once a day (at bedtime)  -- Indication: For COnstipation    fluticasone 50 mcg/inh nasal spray  -- 1 spray(s) into nose once a day  -- Indication: For Allergic rhinitis    pantoprazole 40 mg oral delayed release tablet  -- 1 tab(s) by mouth once a day (before a meal)  -- Indication: For Gerd    Synthroid 50 mcg (0.05 mg) oral tablet  -- 1 tab(s) by mouth once a day  -- Indication: For Hypothyroidism    Multiple Vitamins oral tablet  -- 1 tab(s) by mouth once a day  -- Indication: For supplement    ascorbic acid 250 mg oral tablet  -- 1 tab(s) by mouth once a day  -- Indication: For supplement

## 2018-03-08 NOTE — DISCHARGE NOTE ADULT - PLAN OF CARE
workup of other causes of worsening dementia You were admitted due to concerns of altered/worsening mental status. A full infectious workup was conducted, which was negative. A cardiac workup was also conducted, which was negative. Likely, you are experiencing worsening dementia. Please follow with medications as above. BP < 150/90 Continue with medications as above. Your TSH was elevated to 11. Your synthroid 50mcg/day was re-added. Please re-check TSH level in 6 weeks upon discharge for any further synthroid dosage adjustments necessary. During your hospital stay, you developed a urinary tract infection, which was successfully treated with 3 days of oral antibiotic treatment. Please continue with overall current care plan. Continue with duonebs and supplemental oxygen only as needed, as listed above. Continue with aspirin and statin as above.

## 2018-03-08 NOTE — PROGRESS NOTE ADULT - SUBJECTIVE AND OBJECTIVE BOX
Patient is a 89y old  Female who presents with a chief complaint of AMS/frequent fall/worsening dementia      INTERVAL HPI/OVERNIGHT EVENTS:  T(C): 36.4 (03-08-18 @ 06:03), Max: 36.7 (03-07-18 @ 21:25)  HR: 85 (03-08-18 @ 06:03) (85 - 98)  BP: 124/59 (03-08-18 @ 06:03) (124/59 - 144/68)  RR: 17 (03-08-18 @ 06:03) (16 - 17)  SpO2: 98% (03-08-18 @ 06:03) (96% - 99%)  Wt(kg): --    LABS:              CAPILLARY BLOOD GLUCOSE            RADIOLOGY & ADDITIONAL TESTS:    Consultant(s) Notes Reviewed:  [x ] YES  [ ] NO    PHYSICAL EXAM:  GENERAL: well built, well nourished  HEAD:  Atraumatic, Normocephalic  EYES: EOMI, PERRLA, conjunctiva and sclera clear  ENT: No tonsillar erythema, exudates, or enlargement; Moist mucous membranes, Good dentition, No lesions  NECK: Supple, No JVD, Normal thyroid, no enlarged nodes  NERVOUS SYSTEM:  Alert & Oriented X3, Good concentration; Motor Strength 5/5 B/L upper and lower extremities; DTRs 2+ intact and symmetric, sensory intact  CHEST/LUNG: B/L good air entry; No rales, rhonchi, or wheezing  HEART: S1S2 irregular, rate controlled  ABDOMEN: Soft, Nontender, Nondistended; Bowel sounds present  EXTREMITIES:  2+ Peripheral Pulses, No clubbing, cyanosis, or edema  LYMPH: No lymphadenopathy noted  SKIN: No rashes or lesions    Care Discussed with Consultants/Other Providers [ x] YES  [ ] NO

## 2018-03-08 NOTE — DISCHARGE NOTE ADULT - CARE PLAN
Principal Discharge DX:	Dementia  Goal:	workup of other causes of worsening dementia  Assessment and plan of treatment:	You were admitted due to concerns of altered/worsening mental status. A full infectious workup was conducted, which was negative. A cardiac workup was also conducted, which was negative. Likely, you are experiencing worsening dementia. Please follow with medications as above.  Secondary Diagnosis:	HTN (hypertension)  Goal:	BP < 150/90  Assessment and plan of treatment:	Continue with medications as above.  Secondary Diagnosis:	Hypothyroidism  Assessment and plan of treatment:	Your TSH was elevated to 11. Your synthroid 50mcg/day was re-added. Please re-check TSH level in 6 weeks upon discharge for any further synthroid dosage adjustments necessary.  Secondary Diagnosis:	Leukocytosis  Assessment and plan of treatment:	During your hospital stay, you developed a urinary tract infection, which was successfully treated with 3 days of oral antibiotic treatment. Please continue with overall current care plan.  Secondary Diagnosis:	COPD (chronic obstructive pulmonary disease)  Assessment and plan of treatment:	Continue with duonebs and supplemental oxygen only as needed, as listed above.  Secondary Diagnosis:	CAD (coronary artery disease)  Assessment and plan of treatment:	Continue with aspirin and statin as above.

## 2018-03-08 NOTE — DISCHARGE NOTE ADULT - CARE PROVIDER_API CALL
Angela Topete (RIKI), Internal Medicine  [address] [place] [address]  Phone: [phone]  Fax: [phone]

## 2018-03-08 NOTE — DISCHARGE NOTE ADULT - PATIENT PORTAL LINK FT
You can access the [facility] Patient Portal, offered by [facility], by registering with the following website: [email or web address]

## 2018-03-08 NOTE — DISCHARGE NOTE ADULT - MEDICATION SUMMARY - MEDICATIONS TO STOP TAKING
I will STOP taking the medications listed below when I get home from the hospital:    clonazePAM 0.5 mg oral tablet  -- 1 tab(s) by mouth once a day (at bedtime)

## 2018-04-24 ENCOUNTER — INPATIENT (INPATIENT)
Facility: HOSPITAL | Age: 83
LOS: 0 days | Discharge: NURSING, REHAB OR LONG-TERM CARE | DRG: 988 | End: 2018-04-25
Attending: INTERNAL MEDICINE | Admitting: INTERNAL MEDICINE
Payer: COMMERCIAL

## 2018-04-24 VITALS
SYSTOLIC BLOOD PRESSURE: 124 MMHG | HEART RATE: 90 BPM | TEMPERATURE: 98 F | RESPIRATION RATE: 18 BRPM | DIASTOLIC BLOOD PRESSURE: 70 MMHG | OXYGEN SATURATION: 98 %
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
## 2018-04-24 DIAGNOSIS — I50.9 HEART FAILURE, UNSPECIFIED: ICD-10-CM

## 2018-04-24 DIAGNOSIS — Z71.89 OTHER SPECIFIED COUNSELING: ICD-10-CM

## 2018-04-24 DIAGNOSIS — I48.0 PAROXYSMAL ATRIAL FIBRILLATION: ICD-10-CM

## 2018-04-24 DIAGNOSIS — E03.9 HYPOTHYROIDISM, UNSPECIFIED: ICD-10-CM

## 2018-04-24 DIAGNOSIS — I25.10 ATHEROSCLEROTIC HEART DISEASE OF NATIVE CORONARY ARTERY WITHOUT ANGINA PECTORIS: ICD-10-CM

## 2018-04-24 DIAGNOSIS — J45.909 UNSPECIFIED ASTHMA, UNCOMPLICATED: ICD-10-CM

## 2018-04-24 DIAGNOSIS — J20.9 ACUTE BRONCHITIS, UNSPECIFIED: ICD-10-CM

## 2018-04-24 DIAGNOSIS — Z29.9 ENCOUNTER FOR PROPHYLACTIC MEASURES, UNSPECIFIED: ICD-10-CM

## 2018-04-24 DIAGNOSIS — J18.9 PNEUMONIA, UNSPECIFIED ORGANISM: ICD-10-CM

## 2018-04-24 LAB
ALBUMIN SERPL ELPH-MCNC: 2.6 G/DL — LOW (ref 3.5–5)
ALP SERPL-CCNC: 82 U/L — SIGNIFICANT CHANGE UP (ref 40–120)
ALT FLD-CCNC: 14 U/L DA — SIGNIFICANT CHANGE UP (ref 10–60)
ANION GAP SERPL CALC-SCNC: 5 MMOL/L — SIGNIFICANT CHANGE UP (ref 5–17)
APPEARANCE UR: CLEAR — SIGNIFICANT CHANGE UP
APTT BLD: 33.5 SEC — SIGNIFICANT CHANGE UP (ref 27.5–37.4)
AST SERPL-CCNC: 16 U/L — SIGNIFICANT CHANGE UP (ref 10–40)
BASOPHILS # BLD AUTO: 0.1 K/UL — SIGNIFICANT CHANGE UP (ref 0–0.2)
BASOPHILS NFR BLD AUTO: 0.9 % — SIGNIFICANT CHANGE UP (ref 0–2)
BILIRUB SERPL-MCNC: 0.3 MG/DL — SIGNIFICANT CHANGE UP (ref 0.2–1.2)
BILIRUB UR-MCNC: NEGATIVE — SIGNIFICANT CHANGE UP
BUN SERPL-MCNC: 16 MG/DL — SIGNIFICANT CHANGE UP (ref 7–18)
CALCIUM SERPL-MCNC: 9.3 MG/DL — SIGNIFICANT CHANGE UP (ref 8.4–10.5)
CHLORIDE SERPL-SCNC: 108 MMOL/L — SIGNIFICANT CHANGE UP (ref 96–108)
CO2 SERPL-SCNC: 28 MMOL/L — SIGNIFICANT CHANGE UP (ref 22–31)
COLOR SPEC: YELLOW — SIGNIFICANT CHANGE UP
CREAT SERPL-MCNC: 0.74 MG/DL — SIGNIFICANT CHANGE UP (ref 0.5–1.3)
DIFF PNL FLD: NEGATIVE — SIGNIFICANT CHANGE UP
EOSINOPHIL # BLD AUTO: 0.1 K/UL — SIGNIFICANT CHANGE UP (ref 0–0.5)
EOSINOPHIL NFR BLD AUTO: 1 % — SIGNIFICANT CHANGE UP (ref 0–6)
GLUCOSE SERPL-MCNC: 91 MG/DL — SIGNIFICANT CHANGE UP (ref 70–99)
GLUCOSE UR QL: NEGATIVE — SIGNIFICANT CHANGE UP
HCT VFR BLD CALC: 33.7 % — LOW (ref 34.5–45)
HGB BLD-MCNC: 10.6 G/DL — LOW (ref 11.5–15.5)
INR BLD: 1.1 RATIO — SIGNIFICANT CHANGE UP (ref 0.88–1.16)
KETONES UR-MCNC: NEGATIVE — SIGNIFICANT CHANGE UP
LACTATE SERPL-SCNC: 1.6 MMOL/L — SIGNIFICANT CHANGE UP (ref 0.7–2)
LEUKOCYTE ESTERASE UR-ACNC: NEGATIVE — SIGNIFICANT CHANGE UP
LYMPHOCYTES # BLD AUTO: 1 K/UL — SIGNIFICANT CHANGE UP (ref 1–3.3)
LYMPHOCYTES # BLD AUTO: 10 % — LOW (ref 13–44)
MCHC RBC-ENTMCNC: 28.9 PG — SIGNIFICANT CHANGE UP (ref 27–34)
MCHC RBC-ENTMCNC: 31.5 GM/DL — LOW (ref 32–36)
MCV RBC AUTO: 91.8 FL — SIGNIFICANT CHANGE UP (ref 80–100)
MONOCYTES # BLD AUTO: 0.4 K/UL — SIGNIFICANT CHANGE UP (ref 0–0.9)
MONOCYTES NFR BLD AUTO: 4.2 % — SIGNIFICANT CHANGE UP (ref 2–14)
NEUTROPHILS # BLD AUTO: 8.7 K/UL — HIGH (ref 1.8–7.4)
NEUTROPHILS NFR BLD AUTO: 83.8 % — HIGH (ref 43–77)
NITRITE UR-MCNC: NEGATIVE — SIGNIFICANT CHANGE UP
PH UR: 7 — SIGNIFICANT CHANGE UP (ref 5–8)
PLATELET # BLD AUTO: 258 K/UL — SIGNIFICANT CHANGE UP (ref 150–400)
POTASSIUM SERPL-MCNC: 4.1 MMOL/L — SIGNIFICANT CHANGE UP (ref 3.5–5.3)
POTASSIUM SERPL-SCNC: 4.1 MMOL/L — SIGNIFICANT CHANGE UP (ref 3.5–5.3)
PROT SERPL-MCNC: 6.6 G/DL — SIGNIFICANT CHANGE UP (ref 6–8.3)
PROT UR-MCNC: NEGATIVE — SIGNIFICANT CHANGE UP
PROTHROM AB SERPL-ACNC: 12 SEC — SIGNIFICANT CHANGE UP (ref 9.8–12.7)
RBC # BLD: 3.67 M/UL — LOW (ref 3.8–5.2)
RBC # FLD: 13.8 % — SIGNIFICANT CHANGE UP (ref 10.3–14.5)
SODIUM SERPL-SCNC: 141 MMOL/L — SIGNIFICANT CHANGE UP (ref 135–145)
SP GR SPEC: 1.01 — SIGNIFICANT CHANGE UP (ref 1.01–1.02)
UROBILINOGEN FLD QL: 1
WBC # BLD: 10.4 K/UL — SIGNIFICANT CHANGE UP (ref 3.8–10.5)
WBC # FLD AUTO: 10.4 K/UL — SIGNIFICANT CHANGE UP (ref 3.8–10.5)

## 2018-04-24 PROCEDURE — 70450 CT HEAD/BRAIN W/O DYE: CPT | Mod: 26

## 2018-04-24 PROCEDURE — 99285 EMERGENCY DEPT VISIT HI MDM: CPT

## 2018-04-24 PROCEDURE — 72125 CT NECK SPINE W/O DYE: CPT | Mod: 26

## 2018-04-24 RX ORDER — LEVOTHYROXINE SODIUM 125 MCG
50 TABLET ORAL DAILY
Qty: 0 | Refills: 0 | Status: DISCONTINUED | OUTPATIENT
Start: 2018-04-24 | End: 2018-04-25

## 2018-04-24 RX ORDER — FUROSEMIDE 40 MG
20 TABLET ORAL DAILY
Qty: 0 | Refills: 0 | Status: DISCONTINUED | OUTPATIENT
Start: 2018-04-24 | End: 2018-04-25

## 2018-04-24 RX ORDER — SENNA PLUS 8.6 MG/1
2 TABLET ORAL AT BEDTIME
Qty: 0 | Refills: 0 | Status: DISCONTINUED | OUTPATIENT
Start: 2018-04-24 | End: 2018-04-25

## 2018-04-24 RX ORDER — ASPIRIN/CALCIUM CARB/MAGNESIUM 324 MG
81 TABLET ORAL DAILY
Qty: 0 | Refills: 0 | Status: DISCONTINUED | OUTPATIENT
Start: 2018-04-24 | End: 2018-04-25

## 2018-04-24 RX ORDER — PANTOPRAZOLE SODIUM 20 MG/1
40 TABLET, DELAYED RELEASE ORAL
Qty: 0 | Refills: 0 | Status: DISCONTINUED | OUTPATIENT
Start: 2018-04-24 | End: 2018-04-25

## 2018-04-24 RX ORDER — METOPROLOL TARTRATE 50 MG
25 TABLET ORAL DAILY
Qty: 0 | Refills: 0 | Status: DISCONTINUED | OUTPATIENT
Start: 2018-04-24 | End: 2018-04-25

## 2018-04-24 RX ORDER — DOCUSATE SODIUM 100 MG
100 CAPSULE ORAL
Qty: 0 | Refills: 0 | Status: DISCONTINUED | OUTPATIENT
Start: 2018-04-24 | End: 2018-04-25

## 2018-04-24 RX ORDER — TETANUS TOXOID, REDUCED DIPHTHERIA TOXOID AND ACELLULAR PERTUSSIS VACCINE, ADSORBED 5; 2.5; 8; 8; 2.5 [IU]/.5ML; [IU]/.5ML; UG/.5ML; UG/.5ML; UG/.5ML
0.5 SUSPENSION INTRAMUSCULAR ONCE
Qty: 0 | Refills: 0 | Status: COMPLETED | OUTPATIENT
Start: 2018-04-24 | End: 2018-04-24

## 2018-04-24 RX ORDER — SODIUM CHLORIDE 9 MG/ML
3 INJECTION INTRAMUSCULAR; INTRAVENOUS; SUBCUTANEOUS ONCE
Qty: 0 | Refills: 0 | Status: COMPLETED | OUTPATIENT
Start: 2018-04-24 | End: 2018-04-24

## 2018-04-24 RX ORDER — AZTREONAM 2 G
1000 VIAL (EA) INJECTION ONCE
Qty: 0 | Refills: 0 | Status: COMPLETED | OUTPATIENT
Start: 2018-04-24 | End: 2018-04-24

## 2018-04-24 RX ORDER — FLUTICASONE PROPIONATE 50 MCG
1 SPRAY, SUSPENSION NASAL DAILY
Qty: 0 | Refills: 0 | Status: DISCONTINUED | OUTPATIENT
Start: 2018-04-24 | End: 2018-04-25

## 2018-04-24 RX ORDER — AZTREONAM 2 G
1000 VIAL (EA) INJECTION EVERY 6 HOURS
Qty: 0 | Refills: 0 | Status: DISCONTINUED | OUTPATIENT
Start: 2018-04-24 | End: 2018-04-24

## 2018-04-24 RX ORDER — ENOXAPARIN SODIUM 100 MG/ML
30 INJECTION SUBCUTANEOUS DAILY
Qty: 0 | Refills: 0 | Status: DISCONTINUED | OUTPATIENT
Start: 2018-04-24 | End: 2018-04-25

## 2018-04-24 RX ORDER — ASCORBIC ACID 60 MG
500 TABLET,CHEWABLE ORAL DAILY
Qty: 0 | Refills: 0 | Status: DISCONTINUED | OUTPATIENT
Start: 2018-04-24 | End: 2018-04-25

## 2018-04-24 RX ORDER — LISINOPRIL 2.5 MG/1
2.5 TABLET ORAL DAILY
Qty: 0 | Refills: 0 | Status: DISCONTINUED | OUTPATIENT
Start: 2018-04-24 | End: 2018-04-25

## 2018-04-24 RX ORDER — SIMVASTATIN 20 MG/1
40 TABLET, FILM COATED ORAL AT BEDTIME
Qty: 0 | Refills: 0 | Status: DISCONTINUED | OUTPATIENT
Start: 2018-04-24 | End: 2018-04-25

## 2018-04-24 RX ORDER — ALBUTEROL 90 UG/1
2 AEROSOL, METERED ORAL EVERY 6 HOURS
Qty: 0 | Refills: 0 | Status: DISCONTINUED | OUTPATIENT
Start: 2018-04-24 | End: 2018-04-25

## 2018-04-24 RX ORDER — ACETAMINOPHEN 500 MG
650 TABLET ORAL EVERY 6 HOURS
Qty: 0 | Refills: 0 | Status: DISCONTINUED | OUTPATIENT
Start: 2018-04-24 | End: 2018-04-25

## 2018-04-24 RX ADMIN — SIMVASTATIN 40 MILLIGRAM(S): 20 TABLET, FILM COATED ORAL at 22:20

## 2018-04-24 RX ADMIN — SODIUM CHLORIDE 3 MILLILITER(S): 9 INJECTION INTRAMUSCULAR; INTRAVENOUS; SUBCUTANEOUS at 13:58

## 2018-04-24 RX ADMIN — TETANUS TOXOID, REDUCED DIPHTHERIA TOXOID AND ACELLULAR PERTUSSIS VACCINE, ADSORBED 0.5 MILLILITER(S): 5; 2.5; 8; 8; 2.5 SUSPENSION INTRAMUSCULAR at 15:21

## 2018-04-24 RX ADMIN — Medication 650 MILLIGRAM(S): at 22:25

## 2018-04-24 RX ADMIN — SENNA PLUS 2 TABLET(S): 8.6 TABLET ORAL at 22:20

## 2018-04-24 RX ADMIN — Medication 50 MILLIGRAM(S): at 18:00

## 2018-04-24 RX ADMIN — Medication 650 MILLIGRAM(S): at 23:00

## 2018-04-24 NOTE — H&P ADULT - NSHPLABSRESULTS_GEN_ALL_CORE
10.6   10.4  )-----------( 258      ( 24 Apr 2018 14:02 )             33.7     04-24    141  |  108  |  16  ----------------------------<  91  4.1   |  28  |  0.74    Ca    9.3      24 Apr 2018 14:02    TPro  6.6  /  Alb  2.6<L>  /  TBili  0.3  /  DBili  x   /  AST  16  /  ALT  14  /  AlkPhos  82  04-24    < from: CT Cervical Spine No Cont (04.24.18 @ 12:39) >      IMPRESSION:  No CT evidence of cervical spine fracture.    Nonspecific 3 mm irregular nodular opacity in the right lung apex, may be   related to scarring or represent a nodule. A dedicated chest CT may be   performedin 6 months.          < end of copied text >    < from: CT Head No Cont (04.24.18 @ 12:35) >      IMPRESSION:  No acute intracranial hemorrhage, mass effect or midline shift.    < end of copied text >

## 2018-04-24 NOTE — H&P ADULT - HISTORY OF PRESENT ILLNESS
89 y/o F with PMHx of Hypothyroid, HTN, HLD, CAD, Afib, COPD, and Dementia came in with c/o multiple falls. Patient is coming from NH and had a mechanical fall from the wheelchair. She was trying to stand up when she fell and hit her head.  She had a laceration on her scalp which was repaired in the ED. She spiked fever of 100.4 and had mild cough with congestion. Patient is refusing history and physical exam. On multiple attempts of trying to take her history and physical she only repeatedly  says "go away", and "get away from me".    SH: From [facility], Wheelchair bound

## 2018-04-24 NOTE — ED PROVIDER NOTE - OBJECTIVE STATEMENT
89 y/o F pt with a significant PMHx of A-fib, COPD, dementia, heart failure, HTN, HLD, hypothyroidism, NSTEMI, osteoporosis and no significant PSHx BIBA from NH to the ED s/p mechanical fall out of wheelchair. Pt had a witnessed fall as she was trying to get up from a wheelchair, and hit her head on the ground; pt sustained a laceration to her head. Pt is currently at baseline as per son but is complaining of pain to her head. Pt denies LOC or any other complaints. NKDA.

## 2018-04-24 NOTE — H&P ADULT - ASSESSMENT
89 y/o F with PMHx of Hypothyroid, HTN, HLD, CAD, Afib, COPD, and Dementia came in with c/o multiple falls. Patient is admitted for falls and pneumonia

## 2018-04-24 NOTE — H&P ADULT - PROBLEM SELECTOR PLAN 8
[] Previous VTE                                                3  [] Thrombophilia                                             2  [] Lower limb paralysis                                   2    [] Current Cancer                                             2   [X] Immobilization > 24 hrs                              1  [] ICU/CCU stay > 24 hours                             1  [X] Age > 60                                                         1    IMPROVE VTE Score: 2  Lovenox

## 2018-04-24 NOTE — H&P ADULT - PROBLEM SELECTOR PLAN 1
Witnessed mechanical fall at the NH with laceration on the scalp  CT head: Negative  CT cervical spine: negative for fx or dislocation  - f/u Vit D level  - PT evaluation  - Fall precautions

## 2018-04-24 NOTE — ED PROVIDER NOTE - MEDICAL DECISION MAKING DETAILS
89 y/o F pt presents with closed head injury s/p mechanical fall in elderly woman. Will order CT head to r/o bleed, wound closure by staples and reassess.

## 2018-04-24 NOTE — PATIENT PROFILE ADULT. - FALL HARM RISK
coagulation(Bleeding disorder R/T clinical cond/anti-coags)/age(85 years old or older)/other/h/o falls

## 2018-04-24 NOTE — ED ADULT TRIAGE NOTE - CHIEF COMPLAINT QUOTE
biba sent from NH s/p slid off wheelchair / fell backwards hit head sustained lacs to back of the head . no loc reported

## 2018-04-24 NOTE — H&P ADULT - NSHPPHYSICALEXAM_GEN_ALL_CORE
Patient is refusing physical exam. Requested multiple times but she says "Go away" "Get away from me"

## 2018-04-25 LAB
24R-OH-CALCIDIOL SERPL-MCNC: 42.8 NG/ML — SIGNIFICANT CHANGE UP (ref 30–80)
ANION GAP SERPL CALC-SCNC: 8 MMOL/L — SIGNIFICANT CHANGE UP (ref 5–17)
APTT BLD: 34.6 SEC — SIGNIFICANT CHANGE UP (ref 27.5–37.4)
BASOPHILS # BLD AUTO: 0.1 K/UL — SIGNIFICANT CHANGE UP (ref 0–0.2)
BASOPHILS NFR BLD AUTO: 1.1 % — SIGNIFICANT CHANGE UP (ref 0–2)
BUN SERPL-MCNC: 11 MG/DL — SIGNIFICANT CHANGE UP (ref 7–18)
CALCIUM SERPL-MCNC: 9 MG/DL — SIGNIFICANT CHANGE UP (ref 8.4–10.5)
CHLORIDE SERPL-SCNC: 109 MMOL/L — HIGH (ref 96–108)
CO2 SERPL-SCNC: 25 MMOL/L — SIGNIFICANT CHANGE UP (ref 22–31)
CREAT SERPL-MCNC: 0.64 MG/DL — SIGNIFICANT CHANGE UP (ref 0.5–1.3)
EOSINOPHIL # BLD AUTO: 0.2 K/UL — SIGNIFICANT CHANGE UP (ref 0–0.5)
EOSINOPHIL NFR BLD AUTO: 3.8 % — SIGNIFICANT CHANGE UP (ref 0–6)
GLUCOSE SERPL-MCNC: 96 MG/DL — SIGNIFICANT CHANGE UP (ref 70–99)
HCT VFR BLD CALC: 32.8 % — LOW (ref 34.5–45)
HGB BLD-MCNC: 10 G/DL — LOW (ref 11.5–15.5)
INR BLD: 1.23 RATIO — HIGH (ref 0.88–1.16)
LYMPHOCYTES # BLD AUTO: 0.8 K/UL — LOW (ref 1–3.3)
LYMPHOCYTES # BLD AUTO: 12.4 % — LOW (ref 13–44)
MCHC RBC-ENTMCNC: 28.3 PG — SIGNIFICANT CHANGE UP (ref 27–34)
MCHC RBC-ENTMCNC: 30.5 GM/DL — LOW (ref 32–36)
MCV RBC AUTO: 92.9 FL — SIGNIFICANT CHANGE UP (ref 80–100)
MONOCYTES # BLD AUTO: 0.6 K/UL — SIGNIFICANT CHANGE UP (ref 0–0.9)
MONOCYTES NFR BLD AUTO: 9.4 % — SIGNIFICANT CHANGE UP (ref 2–14)
NEUTROPHILS # BLD AUTO: 4.5 K/UL — SIGNIFICANT CHANGE UP (ref 1.8–7.4)
NEUTROPHILS NFR BLD AUTO: 73.2 % — SIGNIFICANT CHANGE UP (ref 43–77)
PLATELET # BLD AUTO: 234 K/UL — SIGNIFICANT CHANGE UP (ref 150–400)
POTASSIUM SERPL-MCNC: 3.6 MMOL/L — SIGNIFICANT CHANGE UP (ref 3.5–5.3)
POTASSIUM SERPL-SCNC: 3.6 MMOL/L — SIGNIFICANT CHANGE UP (ref 3.5–5.3)
PROCALCITONIN SERPL-MCNC: 0.06 NG/ML — HIGH (ref 0–0.04)
PROTHROM AB SERPL-ACNC: 13.5 SEC — HIGH (ref 9.8–12.7)
RBC # BLD: 3.53 M/UL — LOW (ref 3.8–5.2)
RBC # FLD: 14.2 % — SIGNIFICANT CHANGE UP (ref 10.3–14.5)
SODIUM SERPL-SCNC: 142 MMOL/L — SIGNIFICANT CHANGE UP (ref 135–145)
T4 FREE SERPL-MCNC: 1.6 NG/DL — SIGNIFICANT CHANGE UP (ref 0.9–1.8)
TSH SERPL-MCNC: 2.5 UU/ML — SIGNIFICANT CHANGE UP (ref 0.34–4.82)
WBC # BLD: 6.2 K/UL — SIGNIFICANT CHANGE UP (ref 3.8–10.5)
WBC # FLD AUTO: 6.2 K/UL — SIGNIFICANT CHANGE UP (ref 3.8–10.5)

## 2018-04-25 PROCEDURE — 71045 X-RAY EXAM CHEST 1 VIEW: CPT | Mod: 26

## 2018-04-25 RX ORDER — SIMVASTATIN 20 MG/1
1 TABLET, FILM COATED ORAL
Qty: 0 | Refills: 0 | COMMUNITY
Start: 2018-04-25

## 2018-04-25 RX ORDER — LISINOPRIL 2.5 MG/1
1 TABLET ORAL
Qty: 0 | Refills: 0 | COMMUNITY
Start: 2018-04-25

## 2018-04-25 RX ORDER — APIXABAN 2.5 MG/1
1 TABLET, FILM COATED ORAL
Qty: 0 | Refills: 0 | COMMUNITY
Start: 2018-04-25

## 2018-04-25 RX ORDER — ASPIRIN/CALCIUM CARB/MAGNESIUM 324 MG
1 TABLET ORAL
Qty: 0 | Refills: 0 | COMMUNITY
Start: 2018-04-25

## 2018-04-25 RX ORDER — IPRATROPIUM/ALBUTEROL SULFATE 18-103MCG
3 AEROSOL WITH ADAPTER (GRAM) INHALATION
Qty: 0 | Refills: 0 | COMMUNITY

## 2018-04-25 RX ORDER — METOPROLOL TARTRATE 50 MG
1 TABLET ORAL
Qty: 0 | Refills: 0 | COMMUNITY
Start: 2018-04-25

## 2018-04-25 RX ORDER — SENNA PLUS 8.6 MG/1
2 TABLET ORAL
Qty: 0 | Refills: 0 | COMMUNITY
Start: 2018-04-25

## 2018-04-25 RX ORDER — ACETAMINOPHEN 500 MG
2 TABLET ORAL
Qty: 0 | Refills: 0 | COMMUNITY
Start: 2018-04-25

## 2018-04-25 RX ORDER — ASCORBIC ACID 60 MG
1 TABLET,CHEWABLE ORAL
Qty: 0 | Refills: 0 | COMMUNITY
Start: 2018-04-25

## 2018-04-25 RX ORDER — LEVOTHYROXINE SODIUM 125 MCG
1 TABLET ORAL
Qty: 0 | Refills: 0 | COMMUNITY
Start: 2018-04-25

## 2018-04-25 RX ORDER — PANTOPRAZOLE SODIUM 20 MG/1
1 TABLET, DELAYED RELEASE ORAL
Qty: 0 | Refills: 0 | COMMUNITY
Start: 2018-04-25

## 2018-04-25 RX ORDER — FLUTICASONE PROPIONATE 50 MCG
1 SPRAY, SUSPENSION NASAL
Qty: 0 | Refills: 0 | COMMUNITY
Start: 2018-04-25

## 2018-04-25 RX ORDER — DOCUSATE SODIUM 100 MG
1 CAPSULE ORAL
Qty: 0 | Refills: 0 | COMMUNITY
Start: 2018-04-25

## 2018-04-25 RX ORDER — FUROSEMIDE 40 MG
1 TABLET ORAL
Qty: 0 | Refills: 0 | COMMUNITY
Start: 2018-04-25

## 2018-04-25 RX ADMIN — Medication 81 MILLIGRAM(S): at 12:19

## 2018-04-25 RX ADMIN — LISINOPRIL 2.5 MILLIGRAM(S): 2.5 TABLET ORAL at 05:25

## 2018-04-25 RX ADMIN — Medication 50 MICROGRAM(S): at 05:25

## 2018-04-25 RX ADMIN — Medication 1 SPRAY(S): at 17:09

## 2018-04-25 RX ADMIN — Medication 100 MILLIGRAM(S): at 05:25

## 2018-04-25 RX ADMIN — ALBUTEROL 2 PUFF(S): 90 AEROSOL, METERED ORAL at 17:09

## 2018-04-25 RX ADMIN — ALBUTEROL 2 PUFF(S): 90 AEROSOL, METERED ORAL at 12:19

## 2018-04-25 RX ADMIN — Medication 500 MILLIGRAM(S): at 12:18

## 2018-04-25 RX ADMIN — PANTOPRAZOLE SODIUM 40 MILLIGRAM(S): 20 TABLET, DELAYED RELEASE ORAL at 05:25

## 2018-04-25 RX ADMIN — Medication 20 MILLIGRAM(S): at 05:25

## 2018-04-25 RX ADMIN — Medication 1 TABLET(S): at 12:18

## 2018-04-25 RX ADMIN — Medication 25 MILLIGRAM(S): at 05:25

## 2018-04-25 RX ADMIN — ENOXAPARIN SODIUM 30 MILLIGRAM(S): 100 INJECTION SUBCUTANEOUS at 12:20

## 2018-04-25 RX ADMIN — Medication 100 MILLIGRAM(S): at 17:09

## 2018-04-25 NOTE — DISCHARGE NOTE ADULT - CARE PLAN
Principal Discharge DX:	Acute bronchitis  Goal:	Continue and complete antibiotic as instructed  Assessment and plan of treatment:	Continue and complete Levofloxacin  Secondary Diagnosis:	Fall  Goal:	Maintain fall precautions  Secondary Diagnosis:	PAF (paroxysmal atrial fibrillation)  Secondary Diagnosis:	CAD (coronary artery disease)  Goal:	Adherence to medication  Assessment and plan of treatment:	Take your medication daily as instructed  Secondary Diagnosis:	Hypothyroidism Principal Discharge DX:	Acute bronchitis  Goal:	Continue and complete antibiotic as instructed  Assessment and plan of treatment:	Continue and complete Levofloxacin  Secondary Diagnosis:	Fall  Goal:	Maintain fall precautions  Secondary Diagnosis:	PAF (paroxysmal atrial fibrillation)  Goal:	Resumption of Eliquis  Assessment and plan of treatment:	You may resume your Eliquis  Secondary Diagnosis:	CAD (coronary artery disease)  Goal:	Adherence to medication  Assessment and plan of treatment:	Take your medication daily as instructed  Secondary Diagnosis:	Hypothyroidism

## 2018-04-25 NOTE — DISCHARGE NOTE ADULT - PATIENT PORTAL LINK FT
You can access the [facility] Patient Portal, offered by [facility], by registering with the following website: [email or web address]

## 2018-04-25 NOTE — DISCHARGE NOTE ADULT - HOSPITAL COURSE
89yo female with PMH of Hypothyroid, HTN, HLD, CAD, Afib, COPD, and Dementia.  Presented to the ED with after multiple falls.  From [facility] and had a mechanical fall from the wheelchair. She was trying to stand up when she fell and hit her head.  She had a laceration on her scalp which was repaired in the ED. She spiked fever of 100.4 and had mild cough with congestion.     Admitted to medicine for Acute Bronchitis    Fall  Witnessed mechanical fall at the NH with laceration on the scalp  CT head negative  CT cervical spine negative for fracture or dislocation  Maintained fall precautions     Acute Bronchitis  Continued Proventil and Fluticasone  Treated w/ Levaquin  Chest xray negative for new consolidation or pleural effusion.  Revealed right rib deformities  Blood culture pending f/u results  Procalcitonin pending f/u results    PAF (Paroxysmal Atrial Fibrillation)    EKG showed NSR HR: 88  CHADSVASc: 5  Continued Metoprolol with parameters  Held Eliquis due to risk of bleeding from multiple falls    CAD (Coronary Artery Disease)   Continued Aspirin, Simvastatin and Metoprolol    CHF (Congestive Heart Failure)   Not in exacerbation   ECHO: March 2018 EF- 45% with grade II DD  Continued Lasix      Hypothyroidism  Continued Levothyroxine    Need for Prophylactic Measure    Lovenox for VTE prophylaxis    Goals of Care, counseling/discussion  DNR/DNI- paperwork in chart 89yo female with PMH of Hypothyroid, HTN, HLD, CAD, Afib, COPD, and Dementia.  Presented to the ED with after multiple falls.  From [facility] and had a mechanical fall from the wheelchair. She was trying to stand up when she fell and hit her head.  She had a laceration on her scalp which was repaired in the ED. She spiked fever of 100.4 and had mild cough with congestion.     Admitted to medicine for Acute Bronchitis    Fall  Witnessed mechanical fall at the NH with laceration on the scalp  CT head negative  CT cervical spine negative for fracture or dislocation. However, incidental finding of nonspecific 3 mm irregular nodular opacity in the right lung apex.  Recommend chest CT be performed in 6 months to monitor.    Maintained fall precautions     Acute Bronchitis  Continued Proventil and Fluticasone  Treated w/ Levaquin  Chest xray negative for new consolidation or pleural effusion.  Revealed right rib deformities  Blood culture pending f/u results  Procalcitonin pending f/u results    PAF (Paroxysmal Atrial Fibrillation)    EKG showed NSR HR: 88  CHADSVASc: 5  Continued Metoprolol with parameters  Held Eliquis due to risk of bleeding from multiple falls    CAD (Coronary Artery Disease)   Continued Aspirin, Simvastatin and Metoprolol    CHF (Congestive Heart Failure)   Not in exacerbation   ECHO: March 2018 EF- 45% with grade II DD  Continued Lasix      Hypothyroidism  Continued Levothyroxine    Need for Prophylactic Measure    Lovenox for VTE prophylaxis    Goals of Care, counseling/discussion  DNR/DNI- paperwork in chart

## 2018-04-25 NOTE — ADVANCED PRACTICE NURSE CONSULT - ASSESSMENT
This is a 89yr old female patient admitted for Pneumonia, presenting with blanchable erythema to the Bilateral Heels

## 2018-04-25 NOTE — DISCHARGE NOTE ADULT - PLAN OF CARE
Continue and complete antibiotic as instructed Continue and complete Levofloxacin Maintain fall precautions Adherence to medication Take your medication daily as instructed Resumption of Eliquis You may resume your Eliquis

## 2018-04-25 NOTE — DISCHARGE NOTE ADULT - ADDITIONAL INSTRUCTIONS
CT cervical spine noted an incidental finding of nonspecific 3 mm irregular nodular opacity in the right lung apex.  Recommend chest CT be performed in 6 months to monitor. CT cervical spine noted an incidental finding of nonspecific 3 mm irregular nodular opacity in the right lung apex.  Recommend chest CT be performed in 6 months to monitor.  Follow up w/ Dr. Topete for results of blood culture   2 staples at the back of head-May remove in 7 days after 4/30/18

## 2018-04-25 NOTE — PROGRESS NOTE ADULT - SUBJECTIVE AND OBJECTIVE BOX
Patient is a 89y old  Female who presents with a chief complaint of shortness of breath (2018 16:52)/S/P fall/head injury/laceration scalp, started IV ABS, CT head/C spine negative for Fx or CVA      INTERVAL HPI/OVERNIGHT EVENTS:  T(C): 36.2 (18 @ 04:55), Max: 38 (18 @ 13:09)  HR: 82 (18 @ 04:55) (82 - 120)  BP: 152/90 (18 @ 04:55) (128/67 - 153/80)  RR: 16 (18 @ 04:55) (16 - 20)  SpO2: 96% (18 @ 04:55) (94% - 99%)  Wt(kg): --    LABS:                        10.0   6.2   )-----------( 234      ( 2018 05:59 )             32.8         142  |  109<H>  |  11  ----------------------------<  96  3.6   |  25  |  0.64    Ca    9.0      2018 05:59    TPro  6.6  /  Alb  2.6<L>  /  TBili  0.3  /  DBili  x   /  AST  16  /  ALT  14  /  AlkPhos  82  04-24    PT/INR - ( 2018 05:59 )   PT: 13.5 sec;   INR: 1.23 ratio         PTT - ( 2018 05:59 )  PTT:34.6 sec  Urinalysis Basic - ( 2018 20:35 )    Color: Yellow / Appearance: Clear / S.010 / pH: x  Gluc: x / Ketone: Negative  / Bili: Negative / Urobili: 1   Blood: x / Protein: Negative / Nitrite: Negative   Leuk Esterase: Negative / RBC: x / WBC x   Sq Epi: x / Non Sq Epi: x / Bacteria: x      CAPILLARY BLOOD GLUCOSE            RADIOLOGY & ADDITIONAL TESTS:  < from: CT Cervical Spine No Cont (18 @ 12:39) >  IMPRESSION:  No CT evidence of cervical spine fracture.    Nonspecific 3 mm irregular nodular opacity in the right lung apex, may be   related to scarring or represent a nodule. A dedicated chest CT may be   performedin 6 months.    < end of copied text >  < from: CT Head No Cont (18 @ 12:35) >  IMPRESSION:  No acute intracranial hemorrhage, mass effect or midline shift.      < end of copied text >  < from: Xray Chest 1 View AP/PA (18 @ 00:03) >  IMPRESSION:  Right rib deformities. No new consolidation or pleural effusion.    Heart size cannot be accurately assessed in this projection.    < end of copied text >    Consultant(s) Notes Reviewed:  [x ] YES  [ ] NO    PHYSICAL EXAM:  GENERAL: well built, well nourished  HEAD:  Atraumatic, Normocephalic  EYES: EOMI, PERRLA, conjunctiva and sclera clear  ENT: No tonsillar erythema, exudates, or enlargement; Moist mucous membranes, Good dentition, No lesions  NECK: Supple, No JVD, Normal thyroid, no enlarged nodes  NERVOUS SYSTEM:  confused, Motor Strength 5/5 B/L upper and lower extremities; DTRs 2+ intact and symmetric, sensory intact  CHEST/LUNG: B/L good air entry; No rales, rhonchi, or wheezing  HEART: S1S2 normal, no S3, Regular rate and rhythm; No murmurs, rubs, or gallops  ABDOMEN: Soft, Nontender, Nondistended; Bowel sounds present  EXTREMITIES:  2+ Peripheral Pulses, No clubbing, cyanosis, or edema  LYMPH: No lymphadenopathy noted  SKIN: No rashes or lesions    Care Discussed with Consultants/Other Providers [ x] YES  [ ] NO

## 2018-04-25 NOTE — PROGRESS NOTE ADULT - ASSESSMENT
84 yr old female from NH, H/O HTN/A FIB/CAD/dementia, admit hospital for S/P fall/scalp laceration, CT head/C spine no CVA or FX, fever/SOB start IV ABS, CXR no evidence of infiltrate, patient significant improved, afebrile, may D/C NH for PT/assistant ADLS, po ABS for 6 more days.

## 2018-04-25 NOTE — DISCHARGE NOTE ADULT - CARE PROVIDER_API CALL
Angela Topete (RIKI), Internal Medicine  [address] [place] [address]  Phone: [phone]  Fax: [phone]

## 2018-04-26 VITALS
SYSTOLIC BLOOD PRESSURE: 125 MMHG | HEART RATE: 55 BPM | DIASTOLIC BLOOD PRESSURE: 50 MMHG | OXYGEN SATURATION: 98 % | TEMPERATURE: 98 F | RESPIRATION RATE: 18 BRPM

## 2018-04-29 LAB
CULTURE RESULTS: SIGNIFICANT CHANGE UP
CULTURE RESULTS: SIGNIFICANT CHANGE UP
SPECIMEN SOURCE: SIGNIFICANT CHANGE UP
SPECIMEN SOURCE: SIGNIFICANT CHANGE UP

## 2018-05-01 NOTE — PATIENT PROFILE ADULT. - NS PRO MODE OF ARRIVAL
"[facility]  PRIMARY CARE PROVIDER AND CLINIC:  [facility] [address] [facility] / [facility] 56*  Chief Complaint   Patient presents with     Hospital F/U     [place] Medical Record Number:  [id number]    HPI:    Melissa Alfaro is a 84 year old  (12/3/1933),admitted to the [facility] from Hospital  [facility].  Hospital stay 4/27/18 through 4/28/18.  Admitted to this facility for  rehab, medical management and nursing care.  HPI information obtained from: facility chart records, facility staff, patient report, Care Everywhere Epic chart review and family/first contact daughter Yeny Alfaro report.   She was initially hospitalized at [facility] 4/16-4/21/2018 with COPD and CHF exacerbation. ECHO 4/17/2018: EF 65-70%, severe diastolic dysfunction, moderate to severe left atrial enlargement, mild mitral valve regurgitation. She was diuresed. Discharged home on O2, which was new. She returned to the ED 4/27 with increased shortness of breath and inability to manage at home. She was hypoxic on room air. CXR showed small left pleural effusion, too small to tap. She was given lasix IV X 1. Pulmicort was started. She discharged to this facility to be closer to family.      Current issues are:         Chronic obstructive pulmonary disease, unspecified COPD type (H)-reports her breathing is \"the same\". Denies feeling short of breath. Denies cough or chest pain. Daughter asks that Pulmicort be discontinued as all nebulizers make patient feel more anxious. Spiriva is ordered every 3 days and patient reports she was not using it regularly at home. Quit smoking 18 yrs ago.   Pulmonary hypertension due to chronic obstructive pulmonary disease (H)  Chronic diastolic congestive heart failure (H)  Benign essential HTN-BPs:  153/69, 152/70, 161/67   HR: 83-91  Anxiety-history of anxiety and panic attacks, managed with ativan  Cognitive impairment-poor " "historian. Reports she feels confused in a new place with new people. Daughter reports patient  has been doing well living alone, manages her finances and is independent with cares. Daughter has noticed some \"forgetfulness.\"  Physical deconditioning-ambulating short distances with walker and assist. Requires stand by to min assist with cares.     CODE STATUS/ADVANCE DIRECTIVES DISCUSSION:   CPR/Full code   Patient's living condition: lives alone    ALLERGIES:No clinical screening - see comments; Diphtheria-tetanus toxoids; Epinephrine; and Penicillin g  PAST MEDICAL HISTORY:  has a past medical history of Anxiety; Benign neoplasm of colon; Carotid stenosis, asymptomatic; COPD (chronic obstructive pulmonary disease) (H); Corns and callosities; Dermatophytosis of nail; Essential hypertension; History of atrial fibrillation; Hyperlipidemia; Hyperlipidemia; Insomnia; Irritable bowel syndrome; Nocturia; Plantar fascial fibromatosis; Primary localized osteoarthrosis of the hip; Pulmonary hypertension; Sprain of lumbar region; Vitamin D deficiency; and VSD (ventricular septal defect and aortic arch hypoplasia.  PAST SURGICAL HISTORY:  has a past surgical history that includes TOTAL HIP ARTHROPLASTY and appendectomy.  FAMILY HISTORY: family history includes CANCER in her father; CEREBROVASCULAR DISEASE in her mother; Chronic Obstructive Pulmonary Disease in her mother; HEART DISEASE in her father; Lung Cancer in her brother; OSTEOPOROSIS in her sister; Seizure Disorder in her father.  SOCIAL HISTORY:      Post Discharge Medication Reconciliation Status: discharge medications reconciled and changed, per note/orders (see AVS).  Current Outpatient Prescriptions   Medication Sig Dispense Refill     ACETAMINOPHEN PO Take 325-650 mg by mouth every 6 hours as needed for pain       albuterol (PROAIR HFA/PROVENTIL HFA/VENTOLIN HFA) 108 (90 Base) MCG/ACT Inhaler Inhale 1-2 puffs into the lungs every 4 hours as needed for shortness of " "breath / dyspnea or wheezing       AMLODIPINE BESYLATE PO Take 10 mg by mouth daily       ATENOLOL PO Take 50 mg by mouth daily       FUROSEMIDE PO Take 20 mg by mouth daily       LORazepam (ATIVAN PO) Take 0.5 mg by mouth 2 times daily as needed for anxiety       potassium chloride (K-TAB,KLOR-CON) 10 MEQ tablet Take 10 mEq by mouth daily       tiotropium (SPIRIVA) 18 MCG capsule Inhale 18 mcg into the lungs daily          ROS:  10 point ROS of systems including Constitutional, Eyes, Respiratory, Cardiovascular, Gastroenterology, Genitourinary, Integumentary, Muscularskeletal, Psychiatric were all negative except for pertinent positives noted in my HPI.    Exam:  /69  Pulse 83  Temp 98.6  F (37  C)  Resp 19  Ht 5' 2\" (1.575 m)  Wt 103 lb 3.2 oz (46.8 kg)  SpO2 99%  BMI 18.88 kg/m2  GENERAL APPEARANCE:  Alert, in no distress, thin  ENT:  Mouth and posterior oropharynx normal, moist mucous membranes, [place]  EYES:  EOM normal, conjunctiva and lids normal, PERRL  NECK:  No adenopathy,masses or thyromegaly  RESP:  respiratory effort and palpation of chest normal, no respiratory distress, diminished breath sounds bilaterally, faint expiratory wheezes  CV:  Palpation and auscultation of heart done , regular rate and rhythm, no murmur, no edema, +2 pedal pulses  ABDOMEN:  normal bowel sounds, soft, nontender, no hepatosplenomegaly or other masses  M/S:   sitting at edge of bed. ALCANTARA with good strength. No joint inflammation  SKIN:  no rashes or open areas  PSYCH:  oriented to self, place, situation, month, insight and judgement impaired, memory impaired    Lab/Diagnostic data:  [facility] labs:  CHEMISTRY 8 TEST PANEL (04/28/2018 6:45 AM)  Only the most recent of 2 results within the time period is included.    CHEMISTRY 8 TEST PANEL (04/28/2018 6:45 AM)   Component Value Ref Range   GLUCOSE 89 70 - 105 MG/DL   BLOOD UREA NITROGEN 14.0 7 - 26 MG/DL   CREATININE 1.08 (H) 0.55 - 1.02 MG/DL   GFR, EST (OTHER " RACES) 48 (L)  Comment:   REFERENCE RANGE:  NORMAL  >60 ml/min/1.73m`2  GFR REFERENCE RANGE IS NOT ESTABLISHED IN PATIENTS >70 YRS     >60   GFR,EST() 58 (L)  Comment:   REFERENCE RANGE:  NORMAL  >60 ml/min/1.73m`2  GFR REFERENCE RANGE IS NOT ESTABLISHED IN PATIENTS >70 YRS     >60   BUN/CREAT RATIO 13.0 10 - 30   CO2 32.0 (H) 20 - 31 MMOL/L   CHLORIDE 93 (L) 98 - 107 MMOL/L   SODIUM 135 (L) 136 - 145 MMOL/L   POTASSIUM 4.0 3.5 - 5.1 MMOL/L   CALCIUM 9.3 8.4 - 10.2 MG/DL   ANION GAP 14.0 6 - 14   CALCULATED OSMO 279.9 278 - 308 MOSM/KG       CBC WITH DIFFERENTIAL AND PLATELET COUNT (04/27/2018 8:23 AM)  CBC WITH DIFFERENTIAL AND PLATELET COUNT (04/27/2018 8:23 AM)   Component Value Ref Range   WBC COUNT 10.0 4.5 - 11.0 K/UL   RBC COUNT 4.04 4.00 - 5.20 M/UL   HEMOGLOBIN 11.8 (L) 12.0 - 16.0 GM/DL   HEMATOCRIT 35.0 33 - 51 %   MCV 86.6 80 - 100 FL   MCH 29.2 26 - 34 PG   MCHC 33.7 32 - 36 %   RDW 12.7 11.5 - 13.1 %   PLATELETS 347 150 - 450 K/UL   MPV 8.4 6.5 - 10.0 FL   DIFF TYPE AUTOMATED DIFFERENTIAL     % NEUTRO 76.2 35 - 77 %   % LYMPHS 8.1 (L) 24 - 44 %   % MONOCYTE 12.3 (H) 3 - 6 %   % EOS 2.5 0 - 3 %   % BASO 0.3 0 - 1 %   % IMMATURE GRAN 0.6 0.0 - 1.1 %   ABS NEUTROPHIL (ANC) 7.6 1.5 - 8.5 K/UL   ABS LYMPH 0.8 (L) 1.1 - 5.5 K/UL   ABS MONOCYTE 1.2 (H) 0.1 - 0.7 K/UL   ABS EOS 0.3 0.0 - 0.3 K/UL   ABS BASO 0.0 0.0 - 0.1 K/UL   ABS IMMATURE GRAN 0.1 0.00 - 0.14 K/UL   IMMATURE PLATELET FRACTION 0.0 0.0 - 7.2 %   NRBC ABSOLUTE COUNT 0.00 0.00 - 0.72 K/UL   % NUCLEATED RBC 0.0 0 %       ASSESSMENT / PLAN:  (J44.9) Chronic obstructive pulmonary disease, unspecified COPD type (H)  (primary encounter diagnosis)  Comment: some improvement in respiratory status. No s/s of exacerbation  Plan: Spiriva daily. Discontinue Pulmicort per her request. Continue albuterol prn. Wean O2 if able.     (I27.23,  J44.9) Pulmonary hypertension due to chronic obstructive pulmonary disease (H)  (I50.32) Chronic  diastolic congestive heart failure (H)  Comment: appears compensated  Plan: continue lasix. Daily weight. CBC, BMP    (I10) Benign essential HTN  Comment: controlled  Plan: continue amlodipine, atenolol, lasix. Monitor VS    (F41.9) Anxiety  Comment: chronic, worse with unfamiliar surroundings. Hypoxia may also be contributing.   Plan: continue ativan    (R41.89) Cognitive impairment  Comment: appears to have mild to moderate deficits, although she may not be at her baseline status. Possible underlying dementia, vs encephalopathy due to hypoxia  Plan: SPEECH THERAPY to eval higher level cognition    (R53.81) Physical deconditioning  Comment: due to acute illness and recurrent hospitalizations  Plan: PHYSICAL THERAPY/OT. Disposition unclear at this time, but she does not appear safe to live alone        Electronically signed by:  TONY Burciaga CNP                 stretcher

## 2018-05-23 PROCEDURE — 81003 URINALYSIS AUTO W/O SCOPE: CPT

## 2018-05-23 PROCEDURE — 84439 ASSAY OF FREE THYROXINE: CPT

## 2018-05-23 PROCEDURE — 99285 EMERGENCY DEPT VISIT HI MDM: CPT | Mod: 25

## 2018-05-23 PROCEDURE — 94640 AIRWAY INHALATION TREATMENT: CPT

## 2018-05-23 PROCEDURE — 93005 ELECTROCARDIOGRAM TRACING: CPT

## 2018-05-23 PROCEDURE — 85610 PROTHROMBIN TIME: CPT

## 2018-05-23 PROCEDURE — 84145 PROCALCITONIN (PCT): CPT

## 2018-05-23 PROCEDURE — 71045 X-RAY EXAM CHEST 1 VIEW: CPT

## 2018-05-23 PROCEDURE — 70450 CT HEAD/BRAIN W/O DYE: CPT

## 2018-05-23 PROCEDURE — 90715 TDAP VACCINE 7 YRS/> IM: CPT

## 2018-05-23 PROCEDURE — 72125 CT NECK SPINE W/O DYE: CPT

## 2018-05-23 PROCEDURE — 83605 ASSAY OF LACTIC ACID: CPT

## 2018-05-23 PROCEDURE — 85027 COMPLETE CBC AUTOMATED: CPT

## 2018-05-23 PROCEDURE — 80048 BASIC METABOLIC PNL TOTAL CA: CPT

## 2018-05-23 PROCEDURE — 84443 ASSAY THYROID STIM HORMONE: CPT

## 2018-05-23 PROCEDURE — 85730 THROMBOPLASTIN TIME PARTIAL: CPT

## 2018-05-23 PROCEDURE — 80053 COMPREHEN METABOLIC PANEL: CPT

## 2018-05-23 PROCEDURE — 87040 BLOOD CULTURE FOR BACTERIA: CPT

## 2018-05-23 PROCEDURE — 82306 VITAMIN D 25 HYDROXY: CPT

## 2019-08-20 NOTE — ED PROVIDER NOTE - CRITICAL CARE PROVIDED
Patient is healthy, no abnormal findings on history and physical exam   Physical form completed, copy made, and original given to patient   Discussed preventive pregnant and STIs  Discussed avoid using drugs and it's risk  Discussed avoid risky or violence situation  Discussed regular physical activity and adequate sleep
consultation with other physicians/direct patient care (not related to procedure)/interpretation of diagnostic studies/documentation/consult w/ pt's family directly relating to pts condition/additional history taking/conducted a detailed discussion of DNR status

## 2021-07-07 NOTE — PROGRESS NOTE ADULT - PROBLEM/PLAN-4
DISPLAY PLAN FREE TEXT
No deformities present
DISPLAY PLAN FREE TEXT
DISPLAY PLAN FREE TEXT
No deformities present
DISPLAY PLAN FREE TEXT
DISPLAY PLAN FREE TEXT

## 2022-01-31 NOTE — PATIENT PROFILE ADULT. - PT NEEDS ASSIST
Medical clearance requested by Dr. Genevieve Larose. Surgery scheduling will notify Dr. Stephon Hoffman office who will be responsible for making sure the clearance is obtained and is in the chart for surgery.
Potassium 3.3 at [facility] appt, being addressed by PCP and repeat lab on Friday 2/4/22
yes

## 2024-01-09 NOTE — DISCHARGE NOTE ADULT - MEDICATION SUMMARY - MEDICATIONS TO TAKE
oral
I will START or STAY ON the medications listed below when I get home from the hospital:    aspirin 81 mg oral delayed release tablet  -- 1 tab(s) by mouth once a day  -- Indication: For CAD (coronary artery disease)    acetaminophen 325 mg oral tablet  -- 2 tab(s) by mouth every 6 hours, As needed, Mild Pain (1 - 3)  -- Indication: For Pain    lisinopril 2.5 mg oral tablet  -- 1 tab(s) by mouth once a day  -- Indication: For Hypertension    apixaban 2.5 mg oral tablet  -- 1 tab(s) by mouth 2 times a day  -- Indication: For PAF (paroxysmal atrial fibrillation)    simvastatin 40 mg oral tablet  -- 1 tab(s) by mouth once a day (at bedtime)  -- Indication: For CAD (coronary artery disease)    metoprolol succinate 25 mg oral tablet, extended release  -- 1 tab(s) by mouth once a day  -- Indication: For Hypertension    ipratropium-albuterol 0.5 mg-2.5 mg/3 mLinhalation solution  -- 3 milliliter(s) inhaled every 6 hours, As Needed  -- Indication: For COPD    furosemide 20 mg oral tablet  -- 1 tab(s) by mouth once a day  -- Indication: For CHF (congestive heart failure)    docusate sodium 100 mg oral capsule  -- 1 cap(s) by mouth 2 times a day  -- Indication: For GI prophylaxis    senna oral tablet  -- 2 tab(s) by mouth once a day (at bedtime)  -- Indication: For GI prophylaxis    fluticasone 50 mcg/inh nasal spray  -- 1 spray(s) into nose once a day  -- Indication: For Nasal Spray    pantoprazole 40 mg oral delayed release tablet  -- 1 tab(s) by mouth once a day (before a meal)  -- Indication: For GI prophylaxis    levoFLOXacin 500 mg oral tablet  -- 1 tab(s) by mouth every 24 hours x 5d  Received dose in hospital on 4/25  -- Indication: For Acute bronchitis    levothyroxine 50 mcg (0.05 mg) oral tablet  -- 1 tab(s) by mouth once a day  -- Indication: For Hypothyroidism    Multiple Vitamins oral tablet  -- 1 tab(s) by mouth once a day  -- Indication: For Health Maintenance    ascorbic acid 500 mg oral tablet  -- 1 tab(s) by mouth once a day  -- Indication: For Health Maintenance

## 2024-03-20 NOTE — DISCHARGE NOTE ADULT - NS AS DC AMI YN
INTERVAL HPI/OVERNIGHT EVENTS:  POD # 1 s/p hemilaminectomy L5-S1  PT has been experiencing right lower extrem numbness and pain for the past 1.5 years.   Pt has been on long term pain meds. At this time she states that the pain of the lower extrem has nearly resolved completely.   PT states that she has mild incision pain.     MEDICATIONS  (STANDING):  acetaminophen     Tablet .. 975 milliGRAM(s) Oral every 6 hours  ARIPiprazole 20 milliGRAM(s) Oral at bedtime  buprenorphine 8 mG/naloxone 2 mG SL Film 1 Film(s) SubLingual two times a day  celecoxib 200 milliGRAM(s) Oral every 12 hours  fentaNYL PCA (50 MICROgram(s)/mL) 30 milliLiter(s) PCA Continuous PCA Continuous  gabapentin 800 milliGRAM(s) Oral four times a day  methocarbamol 750 milliGRAM(s) Oral every 8 hours  pantoprazole  Injectable 40 milliGRAM(s) IV Push daily  pantoprazole  Injectable 40 milliGRAM(s) IV Push daily  polyethylene glycol 3350 17 Gram(s) Oral daily  QUEtiapine 400 milliGRAM(s) Oral at bedtime  senna 2 Tablet(s) Oral at bedtime  sodium chloride 0.9%. 1000 milliLiter(s) (75 mL/Hr) IV Continuous <Continuous>    MEDICATIONS  (PRN):  naloxone Injectable 0.1 milliGRAM(s) IV Push every 3 minutes PRN For ANY of the following changes in patient status:  A. RR LESS THAN 10 breaths per minute, B. Oxygen saturation LESS THAN 90%, C. Sedation score of 6  ondansetron Injectable 4 milliGRAM(s) IV Push every 6 hours PRN Nausea      Allergies  No Known Allergies  Intolerances    Vital Signs Last 24 Hrs  T(C): 36.9 (20 Mar 2024 08:31), Max: 37.3 (20 Mar 2024 00:05)  T(F): 98.4 (20 Mar 2024 08:31), Max: 99.2 (20 Mar 2024 00:05)  HR: 81 (20 Mar 2024 08:31) (62 - 101)  BP: 114/74 (20 Mar 2024 08:31) (105/71 - 169/89)  BP(mean): 89 (19 Mar 2024 15:00) (81 - 107)  RR: 18 (20 Mar 2024 08:31) (12 - 18)  SpO2: 97% (20 Mar 2024 08:31) (92% - 97%)    Parameters below as of 20 Mar 2024 08:31  Patient On (Oxygen Delivery Method): room air     BMI (kg/m2): 22.3 (03-19-24 @ 06:12)      PHYSICAL EXAM  GENERAL: NAD,   HEAD:  Atraumatic, Normocephalic  EYES: EOMI, PERRLA, conjunctiva and sclera clear  ENMT: No tonsillar erythema, exudates, or enlargement; Moist mucous membranes, Good dentition, No lesions  NECK: Supple,   NERVOUS SYSTEM:  Alert & Oriented X3, Good concentration; Motor Strength 5/5 B/L upper and lower extremities; DTRs 2+ intact and symmetric  CHEST/LUNG: Clear bilaterally;   HEART: Regular rate and rhythm;  ABDOMEN: Soft, Nontender, Nondistended; Bowel sounds present  EXTREMITIES:  2+ Peripheral Pulses, No edema    LABS:                          12.6   11.17 )-----------( 252      ( 20 Mar 2024 04:51 )             39.6     03-20    140  |  106  |  8.8  ----------------------------<  104<H>  4.2   |  22.0  |  0.57    Ca    9.2      20 Mar 2024 04:51  Phos  3.6     03-20  Mg     1.7     03-20    TPro  5.6<L>  /  Alb  3.2<L>  /  TBili  0.2<L>  /  DBili  0.1  /  AST  28  /  ALT  17  /  AlkPhos  65  03-19    PT/INR - ( 19 Mar 2024 06:40 )   PT: 9.4 sec;   INR: 0.84 ratio           Urinalysis Basic - ( 20 Mar 2024 04:51 )    Color: x / Appearance: x / SG: x / pH: x  Gluc: 104 mg/dL / Ketone: x  / Bili: x / Urobili: x   Blood: x / Protein: x / Nitrite: x   Leuk Esterase: x / RBC: x / WBC x   Sq Epi: x / Non Sq Epi: x / Bacteria: x      I&O's Detail    19 Mar 2024 07:01  -  20 Mar 2024 07:00  --------------------------------------------------------  IN:    IV PiggyBack: 100 mL    Oral Fluid: 600 mL  Total IN: 700 mL    OUT:    Voided (mL): 200 mL  Total OUT: 200 mL    Total NET: 500 mL      RADIOLOGY & ADDITIONAL TESTS:   Post-Op Note     HPI:  50 yo female presents for PST with PMH of of gastric bypass, cholecystectomy, gastric ulcers, GERD, hernia repair, active smoker, ETOH dependance, in AA, last drink 1/2023, on Suboxone, right lumbar radiculopathy. Patient presents with 6-7 months of right leg pain in an L5 distribution that is referable to the severe right foraminal stenosis seen at L5-S1. Pain located in right gluteal with radiation down to right foot. Pain is described as sharp and shooting pain. Pain rated an 8/10 continuous throughout the day. Pain is worse with walking, stairs, standing. Patient has been taking Gabapentin, Suboxone Motrin with no relief. Placing frozen ice bottles against her skin makes the pain a little bit better. Sitting for too long and any movement makes the pain worse. She is also taking Motrin. She denies any numbness tingling. She has not had any recent physical therapy. Pt denies bowel/bladder function changes, dizziness, CP, SOB, fever/chills, nausea/vomiting. Pt scheduled for a Right L5-S1 Decompression scheduled 3/14/24 with Dr. Loyola.    (12 Mar 2024 07:18)    INTERVAL HPI  3/19 Right L5 Hemilaminectomy   POD#0    53 yo female seen and examined in bed. NAD. Patient reports improvement of the pain down the leg but reports continued pain to her right hip.     Vital Signs Last 24 Hrs  T(C): 37.1 (19 Mar 2024 16:06), Max: 37.1 (19 Mar 2024 16:06)  T(F): 98.7 (19 Mar 2024 16:06), Max: 98.7 (19 Mar 2024 16:06)  HR: 84 (19 Mar 2024 16:06) (62 - 84)  BP: 128/75 (19 Mar 2024 16:06) (125/78 - 169/89)  BP(mean): 89 (19 Mar 2024 15:00) (81 - 111)  RR: 18 (19 Mar 2024 16:06) (12 - 18)  SpO2: 92% (19 Mar 2024 16:06) (92% - 97%)    Parameters below as of 19 Mar 2024 16:06  Patient On (Oxygen Delivery Method): room air    PHYSICAL EXAM:  GENERAL: NAD  HEAD:  Atraumatic, normocephalic  WOUND: Incision clean, dry and intact with prineo   MENTAL STATUS: AAO x3; Awake, Opens eyes spontaneously, Appropriately conversant without aphasia, following simple commands  CRANIAL NERVES: PERRLA; EOMI; No facial asymmetry; facial sensation grossly intact to light touch b/l  MOTOR: strength 5/5 B/L upper and lower extremities; sensation grossly intact all extremities  SKIN: Warm, dry; no rashes or lesions    LABS:  PT/INR - ( 19 Mar 2024 06:40 )   PT: 9.4 sec;   INR: 0.84 ratio    03-19 @ 07:01  -  03-19 @ 16:32  --------------------------------------------------------  IN: 700 mL / OUT: 0 mL / NET: 700 mL    RADIOLOGY & ADDITIONAL TESTS:  Outpatient MRI done at [facility]      Interval Hx:  Patient seen during rounds  Patient reports pain to be controlled on current medications  Patient denies sedation with medications     Analgesic Dosing for past 24 hours reviewed as below:  acetaminophen     Tablet ..   975 milliGRAM(s) Oral (03-20-24 @ 06:21)   975 milliGRAM(s) Oral (03-19-24 @ 23:58)   975 milliGRAM(s) Oral (03-19-24 @ 18:13)    acetaminophen   IVPB ..   400 mL/Hr IV Intermittent (03-19-24 @ 12:30)    ARIPiprazole   20 milliGRAM(s) Oral (03-19-24 @ 21:35)    buprenorphine 8 mG/naloxone 2 mG SL Film   1 Film(s) SubLingual (03-20-24 @ 07:15)   1 Film(s) SubLingual (03-19-24 @ 13:15)    celecoxib   200 milliGRAM(s) Oral (03-20-24 @ 06:21)   200 milliGRAM(s) Oral (03-19-24 @ 18:14)    fentaNYL    Injectable   50 MICROGram(s) IV Push (03-19-24 @ 12:40)   50 MICROGram(s) IV Push (03-19-24 @ 12:30)    gabapentin   800 milliGRAM(s) Oral (03-20-24 @ 06:21)   800 milliGRAM(s) Oral (03-19-24 @ 23:58)   800 milliGRAM(s) Oral (03-19-24 @ 18:13)   800 milliGRAM(s) Oral (03-19-24 @ 14:00)    ketorolac   Injectable   30 milliGRAM(s) IV Push (03-19-24 @ 13:35)    methocarbamol   750 milliGRAM(s) Oral (03-20-24 @ 06:21)   750 milliGRAM(s) Oral (03-19-24 @ 21:34)    QUEtiapine   400 milliGRAM(s) Oral (03-19-24 @ 21:35)          T(C): 36.9 (03-20-24 @ 08:31), Max: 37.3 (03-20-24 @ 00:05)  HR: 81 (03-20-24 @ 08:31) (62 - 101)  BP: 114/74 (03-20-24 @ 08:31) (105/71 - 169/89)  RR: 18 (03-20-24 @ 08:31) (12 - 18)  SpO2: 97% (03-20-24 @ 08:31) (92% - 97%)      03-19-24 @ 07:01  -  03-20-24 @ 07:00  --------------------------------------------------------  IN: 700 mL / OUT: 200 mL / NET: 500 mL        acetaminophen     Tablet .. 975 milliGRAM(s) Oral every 6 hours  ARIPiprazole 20 milliGRAM(s) Oral at bedtime  buprenorphine 8 mG/naloxone 2 mG SL Film 1 Film(s) SubLingual two times a day  celecoxib 200 milliGRAM(s) Oral every 12 hours  fentaNYL PCA (50 MICROgram(s)/mL) 30 milliLiter(s) PCA Continuous PCA Continuous  gabapentin 800 milliGRAM(s) Oral four times a day  methocarbamol 750 milliGRAM(s) Oral every 8 hours  naloxone Injectable 0.1 milliGRAM(s) IV Push every 3 minutes PRN  ondansetron Injectable 4 milliGRAM(s) IV Push every 6 hours PRN  pantoprazole  Injectable 40 milliGRAM(s) IV Push daily  pantoprazole  Injectable 40 milliGRAM(s) IV Push daily  polyethylene glycol 3350 17 Gram(s) Oral daily  QUEtiapine 400 milliGRAM(s) Oral at bedtime  senna 2 Tablet(s) Oral at bedtime  sodium chloride 0.9%. 1000 milliLiter(s) IV Continuous <Continuous>                          12.6   11.17 )-----------( 252      ( 20 Mar 2024 04:51 )             39.6     03-20    140  |  106  |  8.8  ----------------------------<  104<H>  4.2   |  22.0  |  0.57    Ca    9.2      20 Mar 2024 04:51  Phos  3.6     03-20  Mg     1.7     03-20    TPro  5.6<L>  /  Alb  3.2<L>  /  TBili  0.2<L>  /  DBili  0.1  /  AST  28  /  ALT  17  /  AlkPhos  65  03-19    PT/INR - ( 19 Mar 2024 06:40 )   PT: 9.4 sec;   INR: 0.84 ratio           Urinalysis Basic - ( 20 Mar 2024 04:51 )    Color: x / Appearance: x / SG: x / pH: x  Gluc: 104 mg/dL / Ketone: x  / Bili: x / Urobili: x   Blood: x / Protein: x / Nitrite: x   Leuk Esterase: x / RBC: x / WBC x   Sq Epi: x / Non Sq Epi: x / Bacteria: x        Pain Service   [phone] no

## 2024-08-30 NOTE — DISCHARGE NOTE ADULT - HOSPITAL COURSE
not for 48 hours/No...
89 female with hx of CHF, COPD, Afib, presents with acute resp failure requiring bipap, likely due to CHF exacerbation/pulmonary edema, also with NSTEMI.  Completed abx for pneumonia. Downgraded to SCU 11/14/17.  Respiratory status and overall condition improved no longer requiring NIV

## 2024-11-22 NOTE — PATIENT PROFILE ADULT. - [facility]
CHIEF COMPLAINT:  Chief Complaint   Patient presents with    Well Adolescent     11 YEAR E       HISTORY OF PRESENT ILLNESS:  Joe is a 11 year old male who presents for a well-child-exam.  The patient is brought in by his mother today.       Concerns.    Previous diagnosis of ADHD based on [place] scales. Medication deferred at that time but he continues to be disruptive in class and mom is concerned that it is going to impact relationships with teachers and peers.   No other concerns were raised today.  There has been no fever, runny nose, cough, vomiting, diarrhea, eye drainage or redness, rash, or respiratory distress.    Nutrition:    He is not too picky  He eats vegetables and fruit  He drinks water primarily, some milk    Elimination:    Urinating fine.    No trouble with constipation.    Dry through night.     Sleep:    He is getting to sleep okay.  Sleeping well at night.     No snoring.   Bedtime: 8:30 pm  Wakes: 6-6:30 am    Dentist:  yes    Development:   Grade: finished 5th  [facility] [employer] [facility] [employer]:  Any concerns about your child's development?  No  Do you have any concerns about how your child talks and makes speech sounds?  No  Do you have any concerns about how your child understands what you say?  No  Do you have any concerns about how your child uses his hands and fingers to do things?  No  Do you have any concerns about how your child uses his arms and legs?  No  Do you have any concerns about how your child behaves?  No  Do you have any concerns about how your child gets along with others?  No  Do you have any concerns about how your child is learning to do things for himself?  No  Do you have any concerns about how your child is learning school skills?  No    SOCIAL:  Social History     Social History Narrative    Primary caretakers: mom and dad      Siblings: Tray (9/4/2011), Rupesh 12/21/2015    Smoke exposure: none      : grandma/ uncle      Pets: none        No outpatient medications have been marked as taking for the 7/2/24 encounter (Office Visit) with Adebayo Diaz MD.       ALLERGIES:   Allergen Reactions    Amoxicillin RASH       PHYSICAL EXAM:  Blood pressure 104/66, height 5' 0.25\" (1.53 m), weight 44 kg (97 lb). Body mass index is 18.79 kg/m².  72 %ile (Z= 0.58) based on CDC (Boys, 2-20 Years) weight-for-age data using vitals from 7/2/2024.  78 %ile (Z= 0.77) based on CDC (Boys, 2-20 Years) Stature-for-age data based on Stature recorded on 7/2/2024.  GENERAL:  The patient is an awake, alert, and well appearing male.  No acute distress.  Nontoxic.  Alert and interactive.   HEAD:  Normocephalic, atraumatic.   EYES:  Pupils reactive to light.  Conjunctivae without injection or icterus.  Extraocular movements intact (EOMI).  EARS:  Tympanic membranes (TMs) transparent with good landmarks.  No effusion or inflammation.  MOUTH/ THROAT:  Oropharynx with moist mucous membranes.  No erythema or exudate or oral lesions.  NECK:  Supple, no lymphadenopathy or masses.  HEART:  Regular rate and rhythm.  Normal S1, S2.  No murmurs, rubs, gallops.   LUNGS:  Clear to auscultation bilaterally.  No wheezes, rales, rhonchi.  Normal work of breathing.  ABDOMEN:  Soft, nondistended, nontender.  Bowel sounds normoactive.  No organomegaly or masses.  GENITOURINARY:  Dave 1 male genitalia, testes descended bilaterally.  No hernia present.   EXTREMITIES:  Warm, dry, without abnormalities.  NEUROLOGIC:  Normal tone, bulk, strength.  SKIN:  No rashes or lesions or cyanosis.    ASSESSMENT AND PLAN:  Joe is an otherwise healthy 11 year old male here for well-child check.    Growth:  The patient's Body mass index is 18.79 kg/m²..  This places the patient at the 67.8 %ile (Z= 0.46) based on CDC (Boys, 2-20 Years) BMI-for-age based on BMI available as of 7/2/2024.. Will continue to monitor.    Wears glasses.  Follows with ophthalmology.     Vaccinations:    Meningococcal -  quadrivalent (Menveo®)  Tetanus, Diphtheria, Pertussis (Tdap/ Boostrix®)  HPV declined.    Concerns regarding behavior and school performance.  Based on responses given on the [place] Questionnaires completed by both the parents as well as the patient's teachers, he qualifies for a diagnosis of attention deficit hyperactivity disorder.    I discussed attention deficit hyperactivity disorder (ADHD), comorbid conditions, as well as other entities that can present with similar symptoms.  We discussed management options including medication versus formal cognitive behavioral therapy versus both.      After discussion, it was decided to begin with pharmacologic treatment. We are going to trial him on guanfacine this coming school year. Per mom's wishes, she did not want to try a stimulant medication at this time.    I will plan on seeing him back in 4 weeks after starting the medication.           low back pain unsure